# Patient Record
Sex: MALE | Race: WHITE | NOT HISPANIC OR LATINO | Employment: FULL TIME | URBAN - METROPOLITAN AREA
[De-identification: names, ages, dates, MRNs, and addresses within clinical notes are randomized per-mention and may not be internally consistent; named-entity substitution may affect disease eponyms.]

---

## 2017-02-12 ENCOUNTER — MYC REFILL (OUTPATIENT)
Dept: FAMILY MEDICINE | Facility: CLINIC | Age: 47
End: 2017-02-12

## 2017-02-12 DIAGNOSIS — I10 HYPERTENSION GOAL BP (BLOOD PRESSURE) < 140/90: ICD-10-CM

## 2017-02-13 RX ORDER — HYDROCHLOROTHIAZIDE 25 MG/1
25 TABLET ORAL DAILY
Qty: 90 TABLET | Refills: 0 | Status: SHIPPED | OUTPATIENT
Start: 2017-02-13 | End: 2017-03-02

## 2017-02-13 RX ORDER — LISINOPRIL 30 MG/1
30 TABLET ORAL DAILY
Qty: 90 TABLET | Refills: 0 | Status: SHIPPED | OUTPATIENT
Start: 2017-02-13 | End: 2017-03-02

## 2017-02-13 NOTE — TELEPHONE ENCOUNTER
Message from [employer]:  Original authorizing provider: Joaquina Goldman MD    Lev Braxtondemetricejarrett would like a refill of the following medications:  hydrochlorothiazide (HYDRODIURIL) 25 MG tablet [Joaquina Goldman MD]  lisinopril (PRINIVIL,ZESTRIL) 30 MG tablet [Joaquina Goldman MD]    Preferred pharmacy: [facility] [place], [address]    Comment:  Dr. Howe, Can you renew the prescriptions for my hypertension. I am down to a few pills and I did not realize that my current prescription has no more refills. While 90 days is preferred, even if you could give me a 30 day prescription, that would be very helpful. Please call it in to the [place] pharmacy on [facility]. I will make an appointment for my regular annual physical this week. Thanks, Lev

## 2017-02-13 NOTE — TELEPHONE ENCOUNTER
Prescription approved per [facility] Refill Protocol.  OPAL Werner, RN      hydrochlorothiazide (HYDRODIURIL) 25 MG tablet/     lisinopril (PRINIVIL,ZESTRIL) 30 MG tablet  Last Written Prescription Date: 11/14/16  Last Fill Quantity: 90, # refills: 0  Last Office Visit with [facility], [facility] or [facility] prescribing provider: 12/13/16 with HARSHA Askew  Next 5 appointments (look out 90 days)     Mar 02, 2017  8:40 AM CST   [facility] Physical Adult with Joaquina Goldman MD   [facility]    [address] [place] [id number] [phone]                   Potassium   Date Value Ref Range Status   02/14/2016 3.4 3.4 - 5.3 mmol/L Final     Creatinine   Date Value Ref Range Status   02/14/2016 0.88 0.66 - 1.25 mg/dL Final     BP Readings from Last 3 Encounters:   12/13/16 118/80   08/31/16 120/78   02/15/16 108/75

## 2017-02-14 ENCOUNTER — MYC REFILL (OUTPATIENT)
Dept: FAMILY MEDICINE | Facility: CLINIC | Age: 47
End: 2017-02-14

## 2017-02-14 DIAGNOSIS — I10 HYPERTENSION GOAL BP (BLOOD PRESSURE) < 140/90: ICD-10-CM

## 2017-02-14 RX ORDER — LISINOPRIL 30 MG/1
30 TABLET ORAL DAILY
Qty: 90 TABLET | Refills: 0 | Status: CANCELLED | OUTPATIENT
Start: 2017-02-14

## 2017-02-14 RX ORDER — HYDROCHLOROTHIAZIDE 25 MG/1
25 TABLET ORAL DAILY
Qty: 90 TABLET | Refills: 0 | Status: CANCELLED | OUTPATIENT
Start: 2017-02-14

## 2017-02-14 NOTE — TELEPHONE ENCOUNTER
Message from [employer]:  Original authorizing provider: Joaquina Goldman MD    Lev Braxtondemetricejarrett would like a refill of the following medications:  hydrochlorothiazide (HYDRODIURIL) 25 MG tablet [Joaquina Goldman MD]  lisinopril (PRINIVIL,ZESTRIL) 30 MG tablet [Joaquina Goldman MD]    Preferred pharmacy: [facility] [place], [address]    Comment:  Dr. Howe, Can you renew the prescriptions for my hypertension. I am down to a few pills and I did not realize that my current prescription has no more refills. While 90 days is preferred, even if you could give me a 30 day prescription, that would be very helpful. Please call it in to the [place] pharmacy on [facility]. I will make an appointment for my regular annual physical this week. Thanks, Lev

## 2017-02-15 NOTE — TELEPHONE ENCOUNTER
Both of these medications refilled on 2/13/17.    Writer responded as per below.    TORITO WernerN, RN

## 2017-03-02 ENCOUNTER — OFFICE VISIT (OUTPATIENT)
Dept: FAMILY MEDICINE | Facility: CLINIC | Age: 47
End: 2017-03-02
Payer: COMMERCIAL

## 2017-03-02 VITALS
WEIGHT: 189.25 LBS | DIASTOLIC BLOOD PRESSURE: 74 MMHG | HEIGHT: 70 IN | TEMPERATURE: 98.5 F | OXYGEN SATURATION: 98 % | SYSTOLIC BLOOD PRESSURE: 116 MMHG | BODY MASS INDEX: 27.09 KG/M2 | HEART RATE: 78 BPM

## 2017-03-02 DIAGNOSIS — I10 HYPERTENSION GOAL BP (BLOOD PRESSURE) < 140/90: ICD-10-CM

## 2017-03-02 DIAGNOSIS — Z00.00 ROUTINE GENERAL MEDICAL EXAMINATION AT A HEALTH CARE FACILITY: Primary | ICD-10-CM

## 2017-03-02 DIAGNOSIS — F40.298 ISOLATED OR SPECIFIC PHOBIA: ICD-10-CM

## 2017-03-02 DIAGNOSIS — Z13.220 LIPID SCREENING: ICD-10-CM

## 2017-03-02 LAB
ANION GAP SERPL CALCULATED.3IONS-SCNC: 7 MMOL/L (ref 3–14)
BUN SERPL-MCNC: 15 MG/DL (ref 7–30)
CALCIUM SERPL-MCNC: 9.3 MG/DL (ref 8.5–10.1)
CHLORIDE SERPL-SCNC: 104 MMOL/L (ref 94–109)
CHOLEST SERPL-MCNC: 198 MG/DL
CO2 SERPL-SCNC: 28 MMOL/L (ref 20–32)
CREAT SERPL-MCNC: 1 MG/DL (ref 0.66–1.25)
GFR SERPL CREATININE-BSD FRML MDRD: 80 ML/MIN/1.7M2
GLUCOSE SERPL-MCNC: 98 MG/DL (ref 70–99)
HDLC SERPL-MCNC: 52 MG/DL
LDLC SERPL CALC-MCNC: 116 MG/DL
NONHDLC SERPL-MCNC: 146 MG/DL
POTASSIUM SERPL-SCNC: 3.8 MMOL/L (ref 3.4–5.3)
SODIUM SERPL-SCNC: 139 MMOL/L (ref 133–144)
TRIGL SERPL-MCNC: 148 MG/DL

## 2017-03-02 PROCEDURE — 80048 BASIC METABOLIC PNL TOTAL CA: CPT | Performed by: FAMILY MEDICINE

## 2017-03-02 PROCEDURE — 99396 PREV VISIT EST AGE 40-64: CPT | Performed by: FAMILY MEDICINE

## 2017-03-02 PROCEDURE — 80061 LIPID PANEL: CPT | Performed by: FAMILY MEDICINE

## 2017-03-02 PROCEDURE — 36415 COLL VENOUS BLD VENIPUNCTURE: CPT | Performed by: FAMILY MEDICINE

## 2017-03-02 RX ORDER — LISINOPRIL 30 MG/1
30 TABLET ORAL DAILY
Qty: 90 TABLET | Refills: 3 | Status: SHIPPED | OUTPATIENT
Start: 2017-03-02 | End: 2018-02-14

## 2017-03-02 RX ORDER — LORAZEPAM 0.5 MG/1
0.5 TABLET ORAL 2 TIMES DAILY PRN
Qty: 60 TABLET | Refills: 0 | Status: SHIPPED | OUTPATIENT
Start: 2017-03-02 | End: 2017-11-06

## 2017-03-02 RX ORDER — HYDROCHLOROTHIAZIDE 25 MG/1
25 TABLET ORAL DAILY
Qty: 90 TABLET | Refills: 3 | Status: SHIPPED | OUTPATIENT
Start: 2017-03-02 | End: 2017-11-06

## 2017-03-02 NOTE — PROGRESS NOTES
SUBJECTIVE:     CC: Lev Copeland is an 46 year old male who presents for preventative health visit.     Physical   Annual:     Getting at least 3 servings of Calcium per day::  Yes    Bi-annual eye exam::  Yes    Dental care twice a year::  Yes    Sleep apnea or symptoms of sleep apnea::  None    Diet::  Regular (no restrictions)    Frequency of exercise::  4-5 days/week    Duration of exercise::  15-30 minutes    Taking medications regularly::  Yes    Medication side effects::  Not applicable    Additional concerns today::  No      PROBLEMS TO ADD ON...- no    Today's PHQ-2 Score:   PHQ-2 ( 1999 Pfizer) 2/27/2017   Little interest or pleasure in doing things Not at all   Feeling down, depressed or hopeless Not at all   PHQ-2 Score 0       Abuse: Current or Past(Physical, Sexual or Emotional)- No  Do you feel safe in your environment - Yes    Social History   Substance Use Topics     Smoking status: Never Smoker     Smokeless tobacco: Never Used     Alcohol use Yes      Comment: 5-6 Drinks per week     The patient does not drink >3 drinks per day nor >7 drinks per week.    Last PSA: No results found for: PSA    Recent Labs   Lab Test  02/04/16   0854  02/05/15   1009  03/27/13   0812   CHOL  209*  157  208*   HDL  57  58  39*   LDL  127*  73  122   TRIG  125  132  234*   CHOLHDLRATIO   --   2.7  5.3*   NHDL  152*   --    --        Reviewed orders with patient. Reviewed health maintenance and updated orders accordingly - Yes    Reviewed and updated as needed this visit by clinical staff  Tobacco  Allergies  Meds  Med Hx  Surg Hx  Fam Hx  Soc Hx        Reviewed and updated as needed this visit by Provider  Meds  Med Hx  Surg Hx  Fam Hx        Past Medical History   Diagnosis Date     Hypertension goal BP (blood pressure) < 140/90      Isolated or specific phobia      performance     Stuttering       Past Surgical History   Procedure Laterality Date     No history of surgery       ROS:  CONST: NEGATIVE  "for fevers/chills/sweats, unexplained weight loss/gain, and fatigue  EYES: NEGATIVE for change in vision  ENT: NEGATIVE for difficulty hearing/tinnitus, and problems with teeth/gums  BREAST: NEGATIVE for breast lump/discharge  CV: NEGATIVE for chest pain/discomfort, leg pain with exercise, and palpitations  RESP: NEGATIVE for cough/wheeze, and difficulty breathing  GI: NEGATIVE for abdominal pain, blood in bowel movement, and nausea/vomiting/diarrhea  : NEGATIVE for nighttime urination, leaking urine, unusual vaginal bleeding, penile/vaginal discharge, and concerns about sexual function  MS: NEGATIVE for muscle/joint pain  SKIN: NEGATIVE for rash or mole change  NEURO: NEGATIVE for headache, dizziness/lightheadedness, numbness, memory loss, and loss of coordination  PSYCH: NEGATIVE for anxiety/stress, problems with sleep, and depression  HEME: NEGATIVE for unexplained lumps, and easy bruising/bleeding  ENDO: NEGATIVE for excessive thirst or urination  ALL: NEGATIVE for hay fever/allergies       OBJECTIVE:     /74 (BP Location: Right arm, Patient Position: Chair, Cuff Size: Adult Large)  Pulse 78  Temp 98.5  F (36.9  C) (Oral)  Ht 5' 10.25\" (1.784 m)  Wt 189 lb 4 oz (85.8 kg)  SpO2 98%  BMI 26.96 kg/m2  EXAM:  GENERAL: healthy, alert and no distress  EYES: Eyes grossly normal to inspection, PERRL and conjunctivae and sclerae normal  HENT: ear canals and TM's normal, nose and mouth without ulcers or lesions  NECK: no adenopathy, no asymmetry, masses, or scars and thyroid normal to palpation  RESP: lungs clear to auscultation - no rales, rhonchi or wheezes  CV: regular rate and rhythm, normal S1 S2, no S3 or S4, no murmur, click or rub, no peripheral edema and peripheral pulses strong  ABDOMEN: soft, nontender, no hepatosplenomegaly, no masses and bowel sounds normal  MS: no gross musculoskeletal defects noted, no edema  SKIN: no suspicious lesions or rashes  NEURO: Normal strength and tone, mentation intact " and speech normal  PSYCH: mentation appears normal, affect normal/bright    ASSESSMENT/PLAN:         ICD-10-CM    1. Routine general medical examination at a health care facility Z00.00    2. Hypertension goal BP (blood pressure) < 140/90 I10 BASIC METABOLIC PANEL     lisinopril (PRINIVIL,ZESTRIL) 30 MG tablet     hydrochlorothiazide (HYDRODIURIL) 25 MG tablet   3. Lipid screening Z13.220 Lipid panel reflex to direct LDL   4. Isolated or specific phobia F40.298 LORazepam (ATIVAN) 0.5 MG tablet       COUNSELING:   Reviewed preventive health counseling, as reflected in patient instructions  Special attention given to:        Family planning - considering vasectomy       Weight management plan: Discussed healthy diet and exercise guidelines and patient will follow up in 12 months in clinic to re-evaluate.    Counseling Resources:  ATP IV Guidelines  Pooled Cohorts Equation Calculator  FRAX Risk Assessment  ICSI Preventive Guidelines  Dietary Guidelines for Americans, 2010  USDA's MyPlate  ASA Prophylaxis  Lung CA Screening    Joaquina Goldman MD  [facility]

## 2017-03-02 NOTE — NURSING NOTE
"Chief Complaint   Patient presents with     Physical     is fasting        Initial /74 (BP Location: Right arm, Patient Position: Chair, Cuff Size: Adult Large)  Pulse 78  Temp 98.5  F (36.9  C) (Oral)  Ht 5' 10.25\" (1.784 m)  Wt 189 lb 4 oz (85.8 kg)  SpO2 98%  BMI 26.96 kg/m2 Estimated body mass index is 26.96 kg/(m^2) as calculated from the following:    Height as of this encounter: 5' 10.25\" (1.784 m).    Weight as of this encounter: 189 lb 4 oz (85.8 kg).  Medication Reconciliation: complete     Trudy Sahu MA      "

## 2017-03-02 NOTE — MR AVS SNAPSHOT
After Visit Summary   3/2/2017    Lev Copeland    MRN: [id number]           Patient Information     Date Of Birth          1970        Visit Information        Provider Department      3/2/2017 8:40 AM Joaquina Goldman MD [facility]        Today's Diagnoses     Routine general medical examination at a health care facility    -  1    Hypertension goal BP (blood pressure) < 140/90        Lipid screening        Isolated or specific phobia          Care Instructions      Preventive Health Recommendations  Male Ages 40 to 49    Yearly exam:             See your health care provider every year in order to  o   Review health changes.   o   Discuss preventive care.    o   Review your medicines if your doctor has prescribed any.    You should be tested each year for STDs (sexually transmitted diseases) if you re at risk.     Have a cholesterol test every 5 years.     Have a colonoscopy (test for colon cancer) if someone in your family has had colon cancer or polyps before age 50.     After age 45, have a diabetes test (fasting glucose). If you are at risk for diabetes, you should have this test every 3 years.      Talk with your health care provider about whether or not a prostate cancer screening test (PSA) is right for you.    Shots: Get a flu shot each year. Get a tetanus shot every 10 years.     Nutrition:    Eat at least 5 servings of fruits and vegetables daily.     Eat whole-grain bread, whole-wheat pasta and brown rice instead of white grains and rice.     Talk to your provider about Calcium and Vitamin D.     Lifestyle    Exercise for at least 150 minutes a week (30 minutes a day, 5 days a week). This will help you control your weight and prevent disease.     Limit alcohol to one drink per day.     No smoking.     Wear sunscreen to prevent skin cancer.     See your dentist every six months for an exam and cleaning.            Follow-ups after your visit        Who to contact   "   If you have questions or need follow up information about today's clinic visit or your schedule please contact [facility] directly at [phone].  Normal or non-critical lab and imaging results will be communicated to you by HowGoodhart, letter or phone within 4 business days after the clinic has received the results. If you do not hear from us within 7 days, please contact the clinic through Sellobuyt or phone. If you have a critical or abnormal lab result, we will notify you by phone as soon as possible.  Submit refill requests through E[employer] Exchange or call your pharmacy and they will forward the refill request to us. Please allow 3 business days for your refill to be completed.          Additional Information About Your Visit        Efficiency Exchange Information     E[employer] Exchange gives you secure access to your electronic health record. If you see a primary care provider, you can also send messages to your care team and make appointments. If you have questions, please call your primary care clinic.  If you do not have a primary care provider, please call [phone] and they will assist you.        Care EveryWhere ID     This is your Care EveryWhere ID. This could be used by other organizations to access your [facility] medical records  [id number]        Your Vitals Were     Pulse Temperature Height Pulse Oximetry BMI (Body Mass Index)       78 98.5  F (36.9  C) (Oral) 5' 10.25\" (1.784 m) 98% 26.96 kg/m2        Blood Pressure from Last 3 Encounters:   03/02/17 116/74   12/13/16 118/80   08/31/16 120/78    Weight from Last 3 Encounters:   03/02/17 189 lb 4 oz (85.8 kg)   12/13/16 200 lb (90.7 kg)   08/31/16 202 lb (91.6 kg)              We Performed the Following     BASIC METABOLIC PANEL     Lipid panel reflex to direct LDL          Today's Medication Changes          These changes are accurate as of: 3/2/17  9:21 AM.  If you have any questions, ask your nurse or doctor.               Stop taking these medicines if you " haven't already. Please contact your care team if you have questions.     azithromycin 250 MG tablet   Commonly known as:  ZITHROMAX   Stopped by:  Joaquina Goldman MD           benzonatate 200 MG capsule   Commonly known as:  TESSALON   Stopped by:  Joaquina Goldman MD           fluticasone 50 MCG/ACT spray   Commonly known as:  FLONASE   Stopped by:  Joaquina Goldman MD                Where to get your medicines      These medications were sent to [facility] - [place], [place] - [address]  [address], [place] [address]     Phone:  [phone]     hydrochlorothiazide 25 MG tablet    lisinopril 30 MG tablet         Some of these will need a paper prescription and others can be bought over the counter.  Ask your nurse if you have questions.     Bring a paper prescription for each of these medications     LORazepam 0.5 MG tablet                Primary Care Provider Office Phone # Fax #    Joaquina Goldman [phone]       [address] [address]        Thank you!     Thank you for choosing [facility]  for your care. Our goal is always to provide you with excellent care. Hearing back from our patients is one way we can continue to improve our services. Please take a few minutes to complete the written survey that you may receive in the mail after your visit with us. Thank you!             Your Updated Medication List - Protect others around you: Learn how to safely use, store and throw away your medicines at www.disposemymeds.org.          This list is accurate as of: 3/2/17  9:21 AM.  Always use your most recent med list.                   Brand Name Dispense Instructions for use    hydrochlorothiazide 25 MG tablet    HYDRODIURIL    90 tablet    Take 1 tablet (25 mg) by mouth daily       lisinopril 30 MG tablet    PRINIVIL,ZESTRIL    90 tablet    Take 1 tablet (30 mg) by mouth daily       LORazepam 0.5 MG  tablet    ATIVAN    60 tablet    Take 1 tablet (0.5 mg) by mouth 2 times daily as needed

## 2017-03-02 NOTE — PROGRESS NOTES
"SUBJECTIVE:     CC: Lev Copeland is an 46 year old male who presents for preventative health visit.     Physical   Annual:     Getting at least 3 servings of Calcium per day::  Yes    Bi-annual eye exam::  Yes    Dental care twice a year::  Yes    Sleep apnea or symptoms of sleep apnea::  None    Diet::  Regular (no restrictions)    Frequency of exercise::  4-5 days/week    Duration of exercise::  15-30 minutes    Taking medications regularly::  Yes    Medication side effects::  Not applicable    Additional concerns today::  No      {Outside tests to abstract? :116354}    {additional problems to add:996423}    Today's PHQ-2 Score:   PHQ-2 ( 1999 Pfizer) 2/27/2017   Little interest or pleasure in doing things Not at all   Feeling down, depressed or hopeless Not at all   PHQ-2 Score 0       Abuse: Current or Past(Physical, Sexual or Emotional)- {YES/NO/NA:004880}  Do you feel safe in your environment - {YES/NO/NA:015786}    Social History   Substance Use Topics     Smoking status: Never Smoker     Smokeless tobacco: Never Used     Alcohol use Yes      Comment: 5-6 Drinks per week     {ETOH AUDIT:823780}    Last PSA: No results found for: PSA    Recent Labs   Lab Test  02/04/16   0854  02/05/15   1009  03/27/13   0812   CHOL  209*  157  208*   HDL  57  58  39*   LDL  127*  73  122   TRIG  125  132  234*   CHOLHDLRATIO   --   2.7  5.3*   NHDL  152*   --    --        Reviewed orders with patient. Reviewed health maintenance and updated orders accordingly - {Yes/No:337031::\"Yes\"}    Reviewed and updated as needed this visit by clinical staff         Reviewed and updated as needed this visit by Provider        {HISTORY OPTIONS:459281}    ROS:  {MALE ROS-adult preventive care package:810541::\"C: NEGATIVE for fever, chills, change in weight\",\"I: NEGATIVE for worrisome rashes, moles or lesions\",\"E: NEGATIVE for vision changes or irritation\",\"ENT: NEGATIVE for ear, mouth and throat problems\",\"R: NEGATIVE for significant " "cough or SOB\",\"CV: NEGATIVE for chest pain, palpitations or peripheral edema\",\"GI: NEGATIVE for nausea, abdominal pain, heartburn, or change in bowel habits\",\" male: negative for dysuria, hematuria, decreased urinary stream, erectile dysfunction, urethral discharge\",\"M: NEGATIVE for significant arthralgias or myalgia\",\"N: NEGATIVE for weakness, dizziness or paresthesias\",\"P: NEGATIVE for changes in mood or affect\"}    {CHRONICPROBDATA:268536}  OBJECTIVE:     There were no vitals taken for this visit.  EXAM:  {Exam Choices:426453}    ASSESSMENT/PLAN:     {Diag Picklist:522649}    COUNSELING:   {MALE COUNSELING MESSAGES:727933::\"Reviewed preventive health counseling, as reflected in patient instructions\"}    {Blood Pressure - Adult Preventive:796748}     reports that he has never smoked. He has never used smokeless tobacco.  {Tobacco Cessation needed for ACO -- Delete if patient is a non-smoker:382209}  Estimated body mass index is 28.7 kg/(m^2) as calculated from the following:    Height as of 8/31/16: 5' 10\" (1.778 m).    Weight as of 12/13/16: 200 lb (90.7 kg).   {Weight Management Plan needed for ACO:578022}    Counseling Resources:  ATP IV Guidelines  Pooled Cohorts Equation Calculator  FRAX Risk Assessment  ICSI Preventive Guidelines  Dietary Guidelines for Americans, 2010  USDA's MyPlate  ASA Prophylaxis  Lung CA Screening    Joaquina Goldman MD  [facility]  Answers for HPI/ROS submitted by the patient on 2/27/2017   PHQ-2 Depressed: Not at all, Not at all  PHQ-2 Score: 0    "

## 2017-03-03 NOTE — PROGRESS NOTES
Abdi Ohara,  Thanks for coming to clinic.  Your test results are below.  Everything looks fine.  In fact, your lipid panel (cholesterol) results are improved overall from last year.  Good job!  Joaquina Goldman MD

## 2017-10-26 ENCOUNTER — OFFICE VISIT (OUTPATIENT)
Dept: FAMILY MEDICINE | Facility: CLINIC | Age: 47
End: 2017-10-26
Payer: COMMERCIAL

## 2017-10-26 VITALS
OXYGEN SATURATION: 97 % | HEIGHT: 70 IN | BODY MASS INDEX: 28.77 KG/M2 | SYSTOLIC BLOOD PRESSURE: 136 MMHG | TEMPERATURE: 97.8 F | DIASTOLIC BLOOD PRESSURE: 89 MMHG | HEART RATE: 100 BPM | WEIGHT: 201 LBS

## 2017-10-26 DIAGNOSIS — R42 DIZZINESS: Primary | ICD-10-CM

## 2017-10-26 LAB
ERYTHROCYTE [DISTWIDTH] IN BLOOD BY AUTOMATED COUNT: 11.8 % (ref 10–15)
GLUCOSE BLD-MCNC: 121 MG/DL (ref 70–99)
HCT VFR BLD AUTO: 40 % (ref 40–53)
HGB BLD-MCNC: 13.7 G/DL (ref 13.3–17.7)
MCH RBC QN AUTO: 31.7 PG (ref 26.5–33)
MCHC RBC AUTO-ENTMCNC: 34.3 G/DL (ref 31.5–36.5)
MCV RBC AUTO: 93 FL (ref 78–100)
PLATELET # BLD AUTO: 223 10E9/L (ref 150–450)
RBC # BLD AUTO: 4.32 10E12/L (ref 4.4–5.9)
WBC # BLD AUTO: 6 10E9/L (ref 4–11)

## 2017-10-26 PROCEDURE — 93000 ELECTROCARDIOGRAM COMPLETE: CPT | Performed by: INTERNAL MEDICINE

## 2017-10-26 PROCEDURE — 82947 ASSAY GLUCOSE BLOOD QUANT: CPT | Performed by: INTERNAL MEDICINE

## 2017-10-26 PROCEDURE — 99213 OFFICE O/P EST LOW 20 MIN: CPT | Performed by: INTERNAL MEDICINE

## 2017-10-26 PROCEDURE — 36415 COLL VENOUS BLD VENIPUNCTURE: CPT | Performed by: INTERNAL MEDICINE

## 2017-10-26 PROCEDURE — 84439 ASSAY OF FREE THYROXINE: CPT | Performed by: INTERNAL MEDICINE

## 2017-10-26 PROCEDURE — 85027 COMPLETE CBC AUTOMATED: CPT | Performed by: INTERNAL MEDICINE

## 2017-10-26 PROCEDURE — 84443 ASSAY THYROID STIM HORMONE: CPT | Performed by: INTERNAL MEDICINE

## 2017-10-26 NOTE — MR AVS SNAPSHOT
After Visit Summary   10/26/2017    Lev Copeland    MRN: [id number]           Patient Information     Date Of Birth          1970        Visit Information        Provider Department      10/26/2017 3:30 PM Unique Alvarenga MD [facility]        Today's Diagnoses     Dizziness    -  1       Follow-ups after your visit        Future tests that were ordered for you today     Open Future Orders        Priority Expected Expires Ordered    **Basic metabolic panel FUTURE 14d STAT 11/2/2017 11/9/2017 10/26/2017            Who to contact     If you have questions or need follow up information about today's clinic visit or your schedule please contact [facility] directly at [phone].  Normal or non-critical lab and imaging results will be communicated to you by MyChart, letter or phone within 4 business days after the clinic has received the results. If you do not hear from us within 7 days, please contact the clinic through youcalchart or phone. If you have a critical or abnormal lab result, we will notify you by phone as soon as possible.  Submit refill requests through nexTune or call your pharmacy and they will forward the refill request to us. Please allow 3 business days for your refill to be completed.          Additional Information About Your Visit        MyChart Information     nexTune gives you secure access to your electronic health record. If you see a primary care provider, you can also send messages to your care team and make appointments. If you have questions, please call your primary care clinic.  If you do not have a primary care provider, please call [phone] and they will assist you.        Care EveryWhere ID     This is your Care EveryWhere ID. This could be used by other organizations to access your [facility] medical records  [id number]        Your Vitals Were     Pulse Temperature Height Pulse Oximetry BMI (Body Mass Index)       100  "97.8  F (36.6  C) (Oral) 5' 10\" (1.778 m) 97% 28.84 kg/m2        Blood Pressure from Last 3 Encounters:   10/26/17 136/89   03/02/17 116/74   12/13/16 118/80    Weight from Last 3 Encounters:   10/26/17 201 lb (91.2 kg)   03/02/17 189 lb 4 oz (85.8 kg)   12/13/16 200 lb (90.7 kg)              We Performed the Following     CBC with platelets     EKG 12-lead complete w/read - Clinics     Glucose, whole blood     T4, free     TSH        Primary Care Provider Office Phone # Fax #    Joaquina Goldman [phone]       [address] [facility] [address]        Equal Access to Services     ISAIAS SEXTON : Hadii sharon nicoleo Soneil, waaxda luqadaha, qaybta kaalmada adeegyada, leonard hsu . So [facility] [phone].    ATENCIÓN: Si habla español, tiene a lin disposición servicios gratuitos de asistencia lingüística. Sharad al [phone].    We comply with applicable federal civil rights laws and [place] laws. We do not discriminate on the basis of race, color, national origin, age, disability, sex, sexual orientation, or gender identity.            Thank you!     Thank you for choosing [facility]  for your care. Our goal is always to provide you with excellent care. Hearing back from our patients is one way we can continue to improve our services. Please take a few minutes to complete the written survey that you may receive in the mail after your visit with us. Thank you!             Your Updated Medication List - Protect others around you: Learn how to safely use, store and throw away your medicines at www.disposemymeds.org.          This list is accurate as of: 10/26/17  5:36 PM.  Always use your most recent med list.                   Brand Name Dispense Instructions for use Diagnosis    hydrochlorothiazide 25 MG tablet    HYDRODIURIL    90 tablet    Take 1 tablet (25 mg) by mouth daily    Hypertension goal BP (blood pressure) < 140/90       " lisinopril 30 MG tablet    PRINIVIL,ZESTRIL    90 tablet    Take 1 tablet (30 mg) by mouth daily    Hypertension goal BP (blood pressure) < 140/90       LORazepam 0.5 MG tablet    ATIVAN    60 tablet    Take 1 tablet (0.5 mg) by mouth 2 times daily as needed    Isolated or specific phobia

## 2017-10-26 NOTE — PROGRESS NOTES
[facility] Progress Note        Unique Alvarenga MD, MPH  10/26/2017        History:      A pleasant 46 year old year old male is seen for feeling anxious an d occasionally dizzy for one month. No nausea,vomiting or diarrhea is referred. No headache,or neck stiffness or visual symptoms. No melena or hematuria or hematochezia is referred. No cough or dyspnea or chest pain is referred. No fever or chills. No insect bite or rash. He has underlying HX of hypertension for which he takes HCTZ and lisinopril. He also takes lorazepam for underlying anxiety. However he states that he has not been taking it consistently,although he states that he took his morning dose of 0.5 today. He denies palpitation and denies night sweats or chills. No weight loss or weight gain is referred. No cold or heat intolerance. No loose stools. No tremor or seizure activities. No excessive thirst, polyphagia or polyuria is referred. No MSK symptoms. No calf pain or swelling. No recent travel outside of the [place] is referred.         Assessment and Plan:        1. Dizziness: Neurologically intact and stable.  2. Anxiety disorder: Advised to resume Lorazepam and take it consistently to avoid breakthrough anxiety episodes.  - EKG 12-lead complete : Sinus rhythm; No st segment or T-wave abnormalities. HR=87 BPM.  - CBC with platelets:   -Basic metabolic panel : result is pending  - TSH, T4, free: pending result.  - Glucose, whole blood: non-fasting= 121   Discussed with the patient that a beta blocker may be a good adjuvant for management of his symptoms given his Hx of HPTN and coexisting anxiety disorder. I offered to prescribe it for him. He stated that he would want to take up the discussion with Dr Goldman whom he plans to see within this coming week.  I advised the patient that he should seek medical attention if there is any chest pain ,sudden dyspnea or any visual symptoms or any neurological dysfunction by going to ER or  "calling 911. He expressed understanding.                       Physical Exam:      /89  Pulse 100  Temp 97.8  F (36.6  C) (Oral)  Ht 5' 10\" (1.778 m)  Wt 201 lb (91.2 kg)  SpO2 97%  BMI 28.84 kg/m2     Constitutional: Patient is in no distress The patient is pleasant and cooperative.   HEENT: Head:  Head is atraumatic, normocephalic.    Eyes: Pupils are equal, round and reactive to light and accomodation.  Sclera is non-icteric. No conjunctival injection, or exudate noted. Extraocular motion is intact. Visual acuity is intact bilaterally.  Ears:  External acoustic canals are patent and clear.  There is no erythema and bulging( exudate)  of the ( R/L ) tympanic membrane(s ).   Nose:  No Nasal congestion w/o drainage or mucosal ulceration is noted.  Throat:  Oral mucosa is moist.  No oral lesions are noted.  No posterior pharyngeal hyperemia or exudate noted.     Neck Supple.  There is no cervical lymphadenopathy.  No nuchal rigidity noted.  There is no meningismus.     Cardiovascular: Heart is regular to rate and rhythm.  No murmur is noted.     Chest. Chest Symmetrical, no soft tissues, swelling, or tenderness upon palpation   Lungs: Clear in the anterior and posterior pulmonary fields.   Abdomen: Soft and non-tender.    Back No flank tenderness is noted.   Extremeties No edema, no calf tenderness.   Neuro: No focal deficit. DTR's and cranial nerves are intact. Babinski is absent and romberg is negative. Patient does have some speech impediment.   Skin No petechiae or purpura is noted.  There is no rash.   Mood Normal              Medications:        PRN Meds:          Data:      All new lab and imaging data was reviewed.   Results for orders placed or performed in visit on 10/26/17   CBC with platelets   Result Value Ref Range    WBC 6.0 4.0 - 11.0 10e9/L    RBC Count 4.32 (L) 4.4 - 5.9 10e12/L    Hemoglobin 13.7 13.3 - 17.7 g/dL    Hematocrit 40.0 40.0 - 53.0 %    MCV 93 78 - 100 fl    MCH 31.7 26.5 - " 33.0 pg    MCHC 34.3 31.5 - 36.5 g/dL    RDW 11.8 10.0 - 15.0 %    Platelet Count 223 150 - 450 10e9/L   Glucose, whole blood   Result Value Ref Range    Glucose Whole Blood 121 (H) 70 - 99 mg/dL

## 2017-10-26 NOTE — NURSING NOTE
"Chief Complaint   Patient presents with     Dizziness     Pt in clinic c/o 2 months of intermittent dizziness and some chest tightness.     Respiratory Problems       Initial /89  Pulse 100  Temp 97.8  F (36.6  C) (Oral)  Ht 5' 10\" (1.778 m)  Wt 201 lb (91.2 kg)  SpO2 97%  BMI 28.84 kg/m2 Estimated body mass index is 28.84 kg/(m^2) as calculated from the following:    Height as of this encounter: 5' 10\" (1.778 m).    Weight as of this encounter: 201 lb (91.2 kg).  Medication Reconciliation: complete   Marie Berry/ MA    "

## 2017-10-27 LAB
T4 FREE SERPL-MCNC: 1 NG/DL (ref 0.76–1.46)
TSH SERPL DL<=0.005 MIU/L-ACNC: 1.82 MU/L (ref 0.4–4)

## 2017-11-05 NOTE — PROGRESS NOTES
"     SUBJECTIVE:   Lev Copeland is a 46 year old male who presents to clinic today for the following health issues:      ED/UC Followup:    Facility:  [facility]   Date of visit: 10/26/2017  Reason for visit: dizziness  Current Status: improved     Lev presented to urgent care with complaints of anxiety and intermittent dizziness x 1 month.   Then he \"was just not feeling right\" so went to urgent care.    He had evaluation there including EKG and labs (CBC, glucose, TSH) - all unremarkable.  He was advised to use his lorazepam more liberally until he could see me in follow-up.  He has a prescription for lorazepam for performance anxiety situations (public speaking).  Since the UC visit he has been taking lorazepam twice daily which has been helpful.  He usually takes first dose in AM and then if needed another dose later in the morning.  Does not typically need further dosing during the day.  Has had some episodes of anxiety where he has wanted to cancel class.  (He is a professor.)  No specific triggers or life stressors identified.  Mood is ok    He describes the dizziness as a \"lightheaded feeling.\"  Feels like he stood up too fast (but occurs at other times besides standing up).  He states it does not feel like movement or instability or vertigo  It is intermittent/espisodic.  No associated chest pain or palpitations.       PHQ-9 SCORE 8/31/2016 11/6/2017   Total Score 2 1      CHANDNI-7 SCORE 8/31/2016 11/6/2017   Total Score 2 14          Problem list and histories reviewed & adjusted, as indicated.  Additional history: as documented    BP Readings from Last 3 Encounters:   11/06/17 126/88   10/26/17 136/89   03/02/17 116/74    Wt Readings from Last 3 Encounters:   11/06/17 201 lb (91.2 kg)   10/26/17 201 lb (91.2 kg)   03/02/17 189 lb 4 oz (85.8 kg)        Reviewed and updated as needed this visit by clinical staff  Tobacco  Allergies  Meds  Med Hx  Surg Hx  Fam Hx  Soc Hx    "     ROS:  ENT: NEGATIVE for ear pain, pressure, tinnitus, or change in hearing  CV: NEGATIVE for loss of consciousness     OBJECTIVE:     /88 (BP Location: Right arm, Patient Position: Chair, Cuff Size: Adult Regular)  Pulse 93  Temp 97.7  F (36.5  C) (Tympanic)  Resp 18  Wt 201 lb (91.2 kg)  SpO2 95%  BMI 28.84 kg/m2  Body mass index is 28.84 kg/(m^2).  GEN:  no apparent distress  ENT: external ears and nose without lesions or scars, TM's and canals clear bilaterally and oropharynx clear with moist mucus membranes and normal landmarks  NECK:  Supple without adenopathy, mass, or thyromegaly   LUNGS:  normal respiratory effort, and lungs clear to auscultation bilaterally - no rales, rhonchi or wheezes  CV: regular rate and rhythm, normal S1 S2, no S3 or S4 and no murmur, click or rub      ASSESSMENT/PLAN:       1. Anxiety  2. Isolated or specific phobia  Unclear etiology for recent increase in anxiety.  Discussed treatment options.  Discussed that prn benzo is ok on an occasional basis but if needed daily (as he has needed) we should consider other options including SSRI - or BB as recommended by NIKA MD.  Discussed pros/cons of each and he'd like to try switching his anti-hypertensive regimen to include BB.  For now we'll have him continue to use ativan prn.    - LORazepam (ATIVAN) 0.5 MG tablet; Take 1 tablet (0.5 mg) by mouth 2 times daily as needed  Dispense: 60 tablet; Refill: 0    3. Hypertension goal BP (blood pressure) < 140/90  He'll discontinue hydrochlorothiazide, continue lisinopril, and start toprol.  Discussed potential side effects including bradycardia and hypotension as well as fatigue and possible sexual dysfunction.  I'd like to see him in 2 weeks to monitor HR and BP and assess effects on anxiety.   - metoprolol (TOPROL-XL) 50 MG 24 hr tablet; Take 1 tablet (50 mg) by mouth daily  Dispense: 30 tablet; Refill: 0    4. Dizziness  Unclear etiology.  Initial workup is unremarkable and  symptoms are improved.  Likely related to anxiety and we will monitor.     FUTURE APPOINTMENTS:       - Follow-up visit in 3 weeks.    Patient Instructions   Stop the hydrochlorothiazide.  Start the metoprolol succinate once daily.  Continue the lisinopril once daily.        Joaquina Goldman MD  [facility]

## 2017-11-06 ENCOUNTER — OFFICE VISIT (OUTPATIENT)
Dept: FAMILY MEDICINE | Facility: CLINIC | Age: 47
End: 2017-11-06
Payer: COMMERCIAL

## 2017-11-06 VITALS
SYSTOLIC BLOOD PRESSURE: 126 MMHG | DIASTOLIC BLOOD PRESSURE: 88 MMHG | RESPIRATION RATE: 18 BRPM | OXYGEN SATURATION: 95 % | HEART RATE: 93 BPM | BODY MASS INDEX: 28.84 KG/M2 | WEIGHT: 201 LBS | TEMPERATURE: 97.7 F

## 2017-11-06 DIAGNOSIS — F40.298 ISOLATED OR SPECIFIC PHOBIA: ICD-10-CM

## 2017-11-06 DIAGNOSIS — I10 HYPERTENSION GOAL BP (BLOOD PRESSURE) < 140/90: ICD-10-CM

## 2017-11-06 DIAGNOSIS — R42 DIZZINESS: ICD-10-CM

## 2017-11-06 DIAGNOSIS — F41.9 ANXIETY: Primary | ICD-10-CM

## 2017-11-06 PROCEDURE — 99214 OFFICE O/P EST MOD 30 MIN: CPT | Performed by: FAMILY MEDICINE

## 2017-11-06 RX ORDER — METOPROLOL SUCCINATE 50 MG/1
50 TABLET, EXTENDED RELEASE ORAL DAILY
Qty: 30 TABLET | Refills: 0 | Status: SHIPPED | OUTPATIENT
Start: 2017-11-06 | End: 2017-11-27

## 2017-11-06 RX ORDER — LORAZEPAM 0.5 MG/1
0.5 TABLET ORAL 2 TIMES DAILY PRN
Qty: 60 TABLET | Refills: 0 | Status: SHIPPED | OUTPATIENT
Start: 2017-11-06 | End: 2017-12-27

## 2017-11-06 ASSESSMENT — ANXIETY QUESTIONNAIRES
5. BEING SO RESTLESS THAT IT IS HARD TO SIT STILL: SEVERAL DAYS
2. NOT BEING ABLE TO STOP OR CONTROL WORRYING: MORE THAN HALF THE DAYS
1. FEELING NERVOUS, ANXIOUS, OR ON EDGE: NEARLY EVERY DAY
7. FEELING AFRAID AS IF SOMETHING AWFUL MIGHT HAPPEN: NEARLY EVERY DAY
3. WORRYING TOO MUCH ABOUT DIFFERENT THINGS: MORE THAN HALF THE DAYS
GAD7 TOTAL SCORE: 14
IF YOU CHECKED OFF ANY PROBLEMS ON THIS QUESTIONNAIRE, HOW DIFFICULT HAVE THESE PROBLEMS MADE IT FOR YOU TO DO YOUR WORK, TAKE CARE OF THINGS AT HOME, OR GET ALONG WITH OTHER PEOPLE: SOMEWHAT DIFFICULT
6. BECOMING EASILY ANNOYED OR IRRITABLE: SEVERAL DAYS

## 2017-11-06 ASSESSMENT — PATIENT HEALTH QUESTIONNAIRE - PHQ9
SUM OF ALL RESPONSES TO PHQ QUESTIONS 1-9: 1
5. POOR APPETITE OR OVEREATING: MORE THAN HALF THE DAYS

## 2017-11-06 NOTE — MR AVS SNAPSHOT
After Visit Summary   11/6/2017    Lev Copeland    MRN: [id number]           Patient Information     Date Of Birth          1970        Visit Information        Provider Department      11/6/2017 5:00 PM Joaquina Goldman MD [facility]        Today's Diagnoses     Hypertension goal BP (blood pressure) < 140/90    -  1    Anxiety          Care Instructions    Stop the hydrochlorothiazide.  Start the metoprolol succinate once daily.  Continue the lisinopril once daily.            Follow-ups after your visit        Follow-up notes from your care team     Return in about 3 weeks (around 11/27/2017).      Who to contact     If you have questions or need follow up information about today's clinic visit or your schedule please contact [facility] directly at [phone].  Normal or non-critical lab and imaging results will be communicated to you by MyChart, letter or phone within 4 business days after the clinic has received the results. If you do not hear from us within 7 days, please contact the clinic through [employer] or phone. If you have a critical or abnormal lab result, we will notify you by phone as soon as possible.  Submit refill requests through [employer] or call your pharmacy and they will forward the refill request to us. Please allow 3 business days for your refill to be completed.          Additional Information About Your Visit        MyChart Information     [employer] gives you secure access to your electronic health record. If you see a primary care provider, you can also send messages to your care team and make appointments. If you have questions, please call your primary care clinic.  If you do not have a primary care provider, please call [phone] and they will assist you.        Care EveryWhere ID     This is your Care EveryWhere ID. This could be used by other organizations to access your [facility] medical records  [id number]        Your Vitals  Were     Pulse Temperature Respirations Pulse Oximetry BMI (Body Mass Index)       93 97.7  F (36.5  C) (Tympanic) 18 95% 28.84 kg/m2        Blood Pressure from Last 3 Encounters:   11/06/17 126/88   10/26/17 136/89   03/02/17 116/74    Weight from Last 3 Encounters:   11/06/17 201 lb (91.2 kg)   10/26/17 201 lb (91.2 kg)   03/02/17 189 lb 4 oz (85.8 kg)              Today, you had the following     No orders found for display         Today's Medication Changes          These changes are accurate as of: 11/6/17  6:11 PM.  If you have any questions, ask your nurse or doctor.               Start taking these medicines.        Dose/Directions    metoprolol 50 MG 24 hr tablet   Commonly known as:  TOPROL-XL   Used for:  Hypertension goal BP (blood pressure) < 140/90   Started by:  Joaquina Goldman MD        Dose:  50 mg   Take 1 tablet (50 mg) by mouth daily   Quantity:  30 tablet   Refills:  0         Stop taking these medicines if you haven't already. Please contact your care team if you have questions.     hydrochlorothiazide 25 MG tablet   Commonly known as:  HYDRODIURIL   Stopped by:  Joaquina Goldman MD                Where to get your medicines      These medications were sent to [facility] [address]  [address] [facility] [address]     Phone:  [phone]     metoprolol 50 MG 24 hr tablet                Primary Care Provider Office Phone # Fax #    Joaquina Goldman [phone]       [address]  [facility] [address]        Equal Access to Services     [facility] AH: Hadenoch Neff, dewayne asher, qaleonard barnes. So [facility] [phone].    ATENCIÓN: Si habla español, tiene a lin disposición servicios gratuitos de asistencia lingüística. Sharad sheffield [phone].    We comply with applicable federal civil rights laws and [place] laws. We do not discriminate on the basis of  race, color, national origin, age, disability, sex, sexual orientation, or gender identity.            Thank you!     Thank you for choosing [facility]  for your care. Our goal is always to provide you with excellent care. Hearing back from our patients is one way we can continue to improve our services. Please take a few minutes to complete the written survey that you may receive in the mail after your visit with us. Thank you!             Your Updated Medication List - Protect others around you: Learn how to safely use, store and throw away your medicines at www.disposemymeds.org.          This list is accurate as of: 11/6/17  6:11 PM.  Always use your most recent med list.                   Brand Name Dispense Instructions for use Diagnosis    lisinopril 30 MG tablet    PRINIVIL,ZESTRIL    90 tablet    Take 1 tablet (30 mg) by mouth daily    Hypertension goal BP (blood pressure) < 140/90       LORazepam 0.5 MG tablet    ATIVAN    60 tablet    Take 1 tablet (0.5 mg) by mouth 2 times daily as needed    Isolated or specific phobia       metoprolol 50 MG 24 hr tablet    TOPROL-XL    30 tablet    Take 1 tablet (50 mg) by mouth daily    Hypertension goal BP (blood pressure) < 140/90

## 2017-11-06 NOTE — NURSING NOTE
"Chief Complaint   Patient presents with     Dizziness     Anxiety       Initial /88 (BP Location: Right arm, Patient Position: Chair, Cuff Size: Adult Regular)  Pulse 93  Temp 97.7  F (36.5  C) (Tympanic)  Resp 18  Wt 201 lb (91.2 kg)  SpO2 95%  BMI 28.84 kg/m2 Estimated body mass index is 28.84 kg/(m^2) as calculated from the following:    Height as of 10/26/17: 5' 10\" (1.778 m).    Weight as of this encounter: 201 lb (91.2 kg).  Medication Reconciliation: complete Bonnie Harding MA        "

## 2017-11-07 ASSESSMENT — ANXIETY QUESTIONNAIRES: GAD7 TOTAL SCORE: 14

## 2017-11-07 NOTE — PATIENT INSTRUCTIONS
Stop the hydrochlorothiazide.  Start the metoprolol succinate once daily.  Continue the lisinopril once daily.

## 2017-11-26 NOTE — PROGRESS NOTES
SUBJECTIVE:  Lev Copeland, a 46 year old male, is here to discuss the following issues:     ANXIETY AND HYPERTENSION FOLLOW-UP   We had him switch his hydrochlorothiazide to toprol to see if that would control his BP and improve his anxiety.  He also takes lisinopril 30 mg daily for Htn and has ativan 0.5 mg prn for anxiety.  He notes that after the first few days of metoprolol his anxiety did start to improve.  He has not needed to use any ativan since he started metoprolol.  He has been checking his blood pressure at home and it has been running 130's/80's - a bit higher than when he was on lisinopril and hydrochlorothiazide.    PHQ-9 SCORE 8/31/2016 11/6/2017   Total Score 2 1     No flowsheet data found.  CHANDNI-7 SCORE 8/31/2016 11/6/2017 11/27/2017   Total Score 2 14 0           Problem list and histories reviewed & updated, as indicated.  Patient Active Problem List   Diagnosis     Isolated or specific phobia     Hypertension goal BP (blood pressure) < 140/90       BP Readings from Last 3 Encounters:   11/27/17 136/86   11/06/17 126/88   10/26/17 136/89    Wt Readings from Last 3 Encounters:   11/27/17 205 lb 8 oz (93.2 kg)   11/06/17 201 lb (91.2 kg)   10/26/17 201 lb (91.2 kg)         ROS:  CONST: NEGATIVE for fatigue        OBJECTIVE:  /86  Pulse 63  Temp 97.9  F (36.6  C) (Oral)  Wt 205 lb 8 oz (93.2 kg)  SpO2 95%  BMI 29.49 kg/m2  GEN:  no apparent distress   PSYCH:  Appearance/Behavior: patient is appropriately and casually dressed.  Mood/Affect: Bright/congruent.  Insight: good     ASSESSMENT/PLAN:    ICD-10-CM    1. Hypertension goal BP (blood pressure) < 140/90 I10 metoprolol (TOPROL-XL) 50 MG 24 hr tablet   2. Anxiety F41.9 metoprolol (TOPROL-XL) 50 MG 24 hr tablet     Adding the BB has worked well for his anxiety.  BP is controlled but not as well as it had been.  We discussed options including increasing the lisinopril dose to 40 mg or switching lisinopril to lisinopril-HCTZ.  For  now he'd like to continue on current regimen and monitor blood pressure.          Joaquina Goldman MD   [facility]

## 2017-11-27 ENCOUNTER — OFFICE VISIT (OUTPATIENT)
Dept: FAMILY MEDICINE | Facility: CLINIC | Age: 47
End: 2017-11-27
Payer: COMMERCIAL

## 2017-11-27 VITALS
HEART RATE: 63 BPM | DIASTOLIC BLOOD PRESSURE: 86 MMHG | SYSTOLIC BLOOD PRESSURE: 136 MMHG | WEIGHT: 205.5 LBS | TEMPERATURE: 97.9 F | BODY MASS INDEX: 29.49 KG/M2 | OXYGEN SATURATION: 95 %

## 2017-11-27 DIAGNOSIS — I10 HYPERTENSION GOAL BP (BLOOD PRESSURE) < 140/90: ICD-10-CM

## 2017-11-27 DIAGNOSIS — F41.9 ANXIETY: ICD-10-CM

## 2017-11-27 PROCEDURE — 99213 OFFICE O/P EST LOW 20 MIN: CPT | Performed by: FAMILY MEDICINE

## 2017-11-27 RX ORDER — METOPROLOL SUCCINATE 50 MG/1
50 TABLET, EXTENDED RELEASE ORAL DAILY
Qty: 90 TABLET | Refills: 3 | Status: SHIPPED | OUTPATIENT
Start: 2017-11-27 | End: 2018-08-02

## 2017-11-27 ASSESSMENT — ANXIETY QUESTIONNAIRES
7. FEELING AFRAID AS IF SOMETHING AWFUL MIGHT HAPPEN: NOT AT ALL
1. FEELING NERVOUS, ANXIOUS, OR ON EDGE: NOT AT ALL
GAD7 TOTAL SCORE: 0
IF YOU CHECKED OFF ANY PROBLEMS ON THIS QUESTIONNAIRE, HOW DIFFICULT HAVE THESE PROBLEMS MADE IT FOR YOU TO DO YOUR WORK, TAKE CARE OF THINGS AT HOME, OR GET ALONG WITH OTHER PEOPLE: NOT DIFFICULT AT ALL
3. WORRYING TOO MUCH ABOUT DIFFERENT THINGS: NOT AT ALL
5. BEING SO RESTLESS THAT IT IS HARD TO SIT STILL: NOT AT ALL
2. NOT BEING ABLE TO STOP OR CONTROL WORRYING: NOT AT ALL
6. BECOMING EASILY ANNOYED OR IRRITABLE: NOT AT ALL

## 2017-11-27 ASSESSMENT — PATIENT HEALTH QUESTIONNAIRE - PHQ9: 5. POOR APPETITE OR OVEREATING: NOT AT ALL

## 2017-11-27 NOTE — MR AVS SNAPSHOT
After Visit Summary   11/27/2017    Lev Copeland    MRN: [id number]           Patient Information     Date Of Birth          1970        Visit Information        Provider Department      11/27/2017 5:20 PM Joaquina Goldman MD [facility]        Today's Diagnoses     Hypertension goal BP (blood pressure) < 140/90           Follow-ups after your visit        Who to contact     If you have questions or need follow up information about today's clinic visit or your schedule please contact [facility] directly at [phone].  Normal or non-critical lab and imaging results will be communicated to you by [employer], letter or phone within 4 business days after the clinic has received the results. If you do not hear from us within 7 days, please contact the clinic through [employer] or phone. If you have a critical or abnormal lab result, we will notify you by phone as soon as possible.  Submit refill requests through [employer] or call your pharmacy and they will forward the refill request to us. Please allow 3 business days for your refill to be completed.          Additional Information About Your Visit        MyChart Information     [employer] gives you secure access to your electronic health record. If you see a primary care provider, you can also send messages to your care team and make appointments. If you have questions, please call your primary care clinic.  If you do not have a primary care provider, please call [phone] and they will assist you.        Care EveryWhere ID     This is your Care EveryWhere ID. This could be used by other organizations to access your [facility] medical records  [id number]        Your Vitals Were     Pulse Temperature Pulse Oximetry BMI (Body Mass Index)          63 97.9  F (36.6  C) (Oral) 95% 29.49 kg/m2         Blood Pressure from Last 3 Encounters:   11/27/17 136/86   11/06/17 126/88   10/26/17 136/89    Weight from Last 3 Encounters:    11/27/17 205 lb 8 oz (93.2 kg)   11/06/17 201 lb (91.2 kg)   10/26/17 201 lb (91.2 kg)              Today, you had the following     No orders found for display         Where to get your medicines      These medications were sent to [facility] [place], [place] - [address]  [address], [place] [address]     Phone:  [phone]     metoprolol 50 MG 24 hr tablet          Primary Care Provider Office Phone # Fax #    Joaquina Goldman [phone] [address]  [facility] [address]        Equal Access to Services     [facility] : Lioc Neff, wanenita asher, qaderrell kaalmada morgan, leonard hall. So [facility] [phone].    ATENCIÓN: Si habla español, tiene a lin disposición servicios gratuitos de asistencia lingüística. Llame al [phone].    We comply with applicable federal civil rights laws and [place] laws. We do not discriminate on the basis of race, color, national origin, age, disability, sex, sexual orientation, or gender identity.            Thank you!     Thank you for choosing [facility]  for your care. Our goal is always to provide you with excellent care. Hearing back from our patients is one way we can continue to improve our services. Please take a few minutes to complete the written survey that you may receive in the mail after your visit with us. Thank you!             Your Updated Medication List - Protect others around you: Learn how to safely use, store and throw away your medicines at www.disposemymeds.org.          This list is accurate as of: 11/27/17  5:47 PM.  Always use your most recent med list.                   Brand Name Dispense Instructions for use Diagnosis    lisinopril 30 MG tablet    PRINIVIL,ZESTRIL    90 tablet    Take 1 tablet (30 mg) by mouth daily    Hypertension goal BP (blood pressure) < 140/90       LORazepam 0.5 MG tablet    ATIVAN    60 tablet     Take 1 tablet (0.5 mg) by mouth 2 times daily as needed    Isolated or specific phobia, Anxiety       metoprolol 50 MG 24 hr tablet    TOPROL-XL    90 tablet    Take 1 tablet (50 mg) by mouth daily    Hypertension goal BP (blood pressure) < 140/90

## 2017-11-28 ASSESSMENT — ANXIETY QUESTIONNAIRES: GAD7 TOTAL SCORE: 0

## 2017-12-12 ENCOUNTER — MYC MEDICAL ADVICE (OUTPATIENT)
Dept: FAMILY MEDICINE | Facility: CLINIC | Age: 47
End: 2017-12-12

## 2017-12-12 DIAGNOSIS — F41.9 ANXIETY: Primary | ICD-10-CM

## 2017-12-12 NOTE — TELEPHONE ENCOUNTER
Writer responded as per below.    Mental health referral pended.    Dr. Goldman-Please review and advise/sign if agree.    Thank you!  TORITO BernardN, RN

## 2017-12-13 ENCOUNTER — OFFICE VISIT (OUTPATIENT)
Dept: FAMILY MEDICINE | Facility: CLINIC | Age: 47
End: 2017-12-13
Payer: COMMERCIAL

## 2017-12-13 VITALS
SYSTOLIC BLOOD PRESSURE: 142 MMHG | DIASTOLIC BLOOD PRESSURE: 99 MMHG | HEART RATE: 65 BPM | OXYGEN SATURATION: 96 % | TEMPERATURE: 97.6 F | BODY MASS INDEX: 29.56 KG/M2 | RESPIRATION RATE: 16 BRPM | WEIGHT: 206 LBS

## 2017-12-13 DIAGNOSIS — F41.1 GENERALIZED ANXIETY DISORDER: Primary | ICD-10-CM

## 2017-12-13 DIAGNOSIS — F32.0 MAJOR DEPRESSIVE DISORDER, SINGLE EPISODE, MILD (H): ICD-10-CM

## 2017-12-13 DIAGNOSIS — I10 HYPERTENSION GOAL BP (BLOOD PRESSURE) < 140/90: ICD-10-CM

## 2017-12-13 PROCEDURE — 99214 OFFICE O/P EST MOD 30 MIN: CPT | Performed by: FAMILY MEDICINE

## 2017-12-13 RX ORDER — CITALOPRAM HYDROBROMIDE 20 MG/1
20 TABLET ORAL DAILY
Qty: 30 TABLET | Refills: 0 | Status: SHIPPED | OUTPATIENT
Start: 2017-12-13 | End: 2017-12-19

## 2017-12-13 ASSESSMENT — ANXIETY QUESTIONNAIRES
5. BEING SO RESTLESS THAT IT IS HARD TO SIT STILL: NOT AT ALL
3. WORRYING TOO MUCH ABOUT DIFFERENT THINGS: SEVERAL DAYS
IF YOU CHECKED OFF ANY PROBLEMS ON THIS QUESTIONNAIRE, HOW DIFFICULT HAVE THESE PROBLEMS MADE IT FOR YOU TO DO YOUR WORK, TAKE CARE OF THINGS AT HOME, OR GET ALONG WITH OTHER PEOPLE: SOMEWHAT DIFFICULT
1. FEELING NERVOUS, ANXIOUS, OR ON EDGE: SEVERAL DAYS
6. BECOMING EASILY ANNOYED OR IRRITABLE: NOT AT ALL
2. NOT BEING ABLE TO STOP OR CONTROL WORRYING: SEVERAL DAYS
GAD7 TOTAL SCORE: 5
7. FEELING AFRAID AS IF SOMETHING AWFUL MIGHT HAPPEN: SEVERAL DAYS

## 2017-12-13 ASSESSMENT — PATIENT HEALTH QUESTIONNAIRE - PHQ9
5. POOR APPETITE OR OVEREATING: SEVERAL DAYS
SUM OF ALL RESPONSES TO PHQ QUESTIONS 1-9: 6

## 2017-12-13 NOTE — PATIENT INSTRUCTIONS
Start citalopram 1/2 tab daily for 2-4 days then increase to full tab daily.      See Kristine Beltrán, Behavioral Health Consultant at [facility].

## 2017-12-13 NOTE — PROGRESS NOTES
SUBJECTIVE:  Lev Copeland, a 47 year old male, is here to discuss the following issues:       ANXIETY FOLLOW-UP   He sent [facility] update yesterday indicating his anxiety had worsened over the past week and he has been using lorazepam on a daily basis.  He also expressed concerns about depression - feeling unmotivated, wanting to just sleep.    He has been using metoprolol for his performance and generalized anxiety (and hypertension).  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    He does not see a therapist.    Sleep:  no problems with sleep    Caffeine: drinks 2 cups coffee/day in am.    PHQ-9 SCORE 8/31/2016 11/6/2017 12/13/2017   Total Score 2 1 6     No flowsheet data found.  CHANDNI-7 SCORE 11/6/2017 11/27/2017 12/13/2017   Total Score 14 0 5       Current Outpatient Prescriptions   Medication Sig Dispense Refill     metoprolol (TOPROL-XL) 50 MG 24 hr tablet Take 1 tablet (50 mg) by mouth daily 90 tablet 3     LORazepam (ATIVAN) 0.5 MG tablet Take 1 tablet (0.5 mg) by mouth 2 times daily as needed 60 tablet 0     lisinopril (PRINIVIL,ZESTRIL) 30 MG tablet Take 1 tablet (30 mg) by mouth daily 90 tablet 3        Problem list and histories reviewed & updated, as indicated.  Patient Active Problem List   Diagnosis     Isolated or specific phobia     Hypertension goal BP (blood pressure) < 140/90     Anxiety       BP Readings from Last 3 Encounters:   12/13/17 (!) 142/99   11/27/17 136/86   11/06/17 126/88    Wt Readings from Last 3 Encounters:   12/13/17 206 lb (93.4 kg)   11/27/17 205 lb 8 oz (93.2 kg)   11/06/17 201 lb (91.2 kg)             OBJECTIVE:    BP (!) 142/99 (BP Location: Right arm)  Pulse 65  Temp 97.6  F (36.4  C) (Oral)  Resp 16  Wt 206 lb (93.4 kg)  SpO2 96%  BMI 29.56 kg/m2  GEN:  no apparent distress  PSYCH:    Appearance: appropriately and casually dressed    Eye Contact: good  Attitude:  cooperative    Speech:  stuttering     Thought Form: organized and goal oriented    Thought Content: no evidence of psychotic thought    Mood: anxious    Affect: mood congruent    Insight: good     ASSESSMENT/PLAN:    ICD-10-CM    1. Generalized anxiety disorder F41.1 citalopram (CELEXA) 20 MG tablet   2. Major depressive disorder, single episode, mild (H) F32.0 citalopram (CELEXA) 20 MG tablet   3. Hypertension goal BP (blood pressure) < 140/90 I10      Discussed that daily use of benzo is not desirable.  I do think he needs a daily SSRI as the metoprolol alone did not adequately control his anxiety.  Discussed role of SSRI medications.  Reviewed potential for initial side effects (such as headache, GI symptoms, and dry mouth) that will likely subside after a week or so, but that therapeutic effects will likely take 1-2 weeks - so it's important to stick with medication for at least a month to adequately gauge effect.  Notify me of any significant side effects or any SI.  Discussed that treatment with SSRI medications requires a minimum commitment of 9-12 months; shorter courses are associated with rebound symptoms.  OK to continue using lorazepam prn while we await onset of therapeutic effect of citalopram.  I also recommended visit with Kristine Beltrán, [facility].  As he is leaving for [place] in 3 weeks he may not be able to meet with Darryl until his return in Feb.      Of note BP is not controlled today but he is anxious and I'll be seeing him back in clinic in 2 weeks.      Return to Clinic in 2 weeks.      Patient Instructions   Start citalopram 1/2 tab daily for 2-4 days then increase to full tab daily.      See Kristine Beltrán, [facility]al Health Consultant at [facility].            Joaquina Goldman MD   [facility]

## 2017-12-13 NOTE — MR AVS SNAPSHOT
After Visit Summary   12/13/2017    Lev Copeland    MRN: [id number]           Patient Information     Date Of Birth          1970        Visit Information        Provider Department      12/13/2017 3:00 PM Joaquina Goldman MD [facility]        Today's Diagnoses     Generalized anxiety disorder    -  1      Care Instructions    Start citalopram 1/2 tab daily for 2-4 days then increase to full tab daily.      See Kristine Beltrán, Behavioral Health Consultant at [facility].                Follow-ups after your visit        Follow-up notes from your care team     Return in about 2 weeks (around 12/27/2017).      Who to contact     If you have questions or need follow up information about today's clinic visit or your schedule please contact [facility] directly at [phone].  Normal or non-critical lab and imaging results will be communicated to you by MyChart, letter or phone within 4 business days after the clinic has received the results. If you do not hear from us within 7 days, please contact the clinic through MyChart or phone. If you have a critical or abnormal lab result, we will notify you by phone as soon as possible.  Submit refill requests through [facility] or call your pharmacy and they will forward the refill request to us. Please allow 3 business days for your refill to be completed.          Additional Information About Your Visit        MyChart Information     [facility] gives you secure access to your electronic health record. If you see a primary care provider, you can also send messages to your care team and make appointments. If you have questions, please call your primary care clinic.  If you do not have a primary care provider, please call [phone] and they will assist you.        Care EveryWhere ID     This is your Care EveryWhere ID. This could be used by other organizations to access your [facility]  records  [id number]        Your Vitals Were     Pulse Temperature Respirations Pulse Oximetry BMI (Body Mass Index)       65 97.6  F (36.4  C) (Oral) 16 96% 29.56 kg/m2        Blood Pressure from Last 3 Encounters:   12/13/17 (!) 142/99   11/27/17 136/86   11/06/17 126/88    Weight from Last 3 Encounters:   12/13/17 206 lb (93.4 kg)   11/27/17 205 lb 8 oz (93.2 kg)   11/06/17 201 lb (91.2 kg)              Today, you had the following     No orders found for display         Today's Medication Changes          These changes are accurate as of: 12/13/17  3:25 PM.  If you have any questions, ask your nurse or doctor.               Start taking these medicines.        Dose/Directions    citalopram 20 MG tablet   Commonly known as:  celeXA   Used for:  Generalized anxiety disorder   Started by:  Joaquina Goldman MD        Dose:  20 mg   Take 1 tablet (20 mg) by mouth daily   Quantity:  30 tablet   Refills:  0            Where to get your medicines      These medications were sent to [facility] [place], [place] - [address]  [address], [facility] [address]     Phone:  [phone]     citalopram 20 MG tablet                Primary Care Provider Office Phone # Fax #    Joaquina Goldman [phone]       [address]  [facility] [address]        Equal Access to Services     [facility]ANJE[facility] AH: Hadii sharon Neff, waaxda luqadaha, qaybta kaalmada cheyenneyada, leonard hatch adeapril hall. So [facility] [phone].    ATENCIÓN: Si habla español, tiene a lin disposición servicios gratuitos de asistencia lingüística. Llame al [phone].    We comply with applicable federal civil rights laws and [place] laws. We do not discriminate on the basis of race, color, national origin, age, disability, sex, sexual orientation, or gender identity.            Thank you!     Thank you for choosing [facility]  for your care. Our goal is always to provide you  with excellent care. Hearing back from our patients is one way we can continue to improve our services. Please take a few minutes to complete the written survey that you may receive in the mail after your visit with us. Thank you!             Your Updated Medication List - Protect others around you: Learn how to safely use, store and throw away your medicines at www.disposemymeds.org.          This list is accurate as of: 12/13/17  3:25 PM.  Always use your most recent med list.                   Brand Name Dispense Instructions for use Diagnosis    citalopram 20 MG tablet    celeXA    30 tablet    Take 1 tablet (20 mg) by mouth daily    Generalized anxiety disorder       lisinopril 30 MG tablet    PRINIVIL,ZESTRIL    90 tablet    Take 1 tablet (30 mg) by mouth daily    Hypertension goal BP (blood pressure) < 140/90       LORazepam 0.5 MG tablet    ATIVAN    60 tablet    Take 1 tablet (0.5 mg) by mouth 2 times daily as needed    Isolated or specific phobia, Anxiety       metoprolol 50 MG 24 hr tablet    TOPROL-XL    90 tablet    Take 1 tablet (50 mg) by mouth daily    Hypertension goal BP (blood pressure) < 140/90, Anxiety

## 2017-12-13 NOTE — NURSING NOTE
"Chief Complaint   Patient presents with     Recheck Medication     Anxiety       Initial BP (!) 142/99 (BP Location: Right arm)  Pulse 65  Temp 97.6  F (36.4  C) (Oral)  Resp 16  Wt 206 lb (93.4 kg)  SpO2 96%  BMI 29.56 kg/m2 Estimated body mass index is 29.56 kg/(m^2) as calculated from the following:    Height as of 10/26/17: 5' 10\" (1.778 m).    Weight as of this encounter: 206 lb (93.4 kg).  Medication Reconciliation: complete     Sandra Gooden CMA      "

## 2017-12-14 ASSESSMENT — ANXIETY QUESTIONNAIRES: GAD7 TOTAL SCORE: 5

## 2017-12-18 ENCOUNTER — TELEPHONE (OUTPATIENT)
Dept: FAMILY MEDICINE | Facility: CLINIC | Age: 47
End: 2017-12-18

## 2017-12-18 NOTE — TELEPHONE ENCOUNTER
The symptoms seem serotonergic and It sounds like the symptoms started before he increased the dose to 20 mg.  In that case I would recommend that he discontinue the citalopram completely.  I'd like for him to be seen tomorrow.  OK to use hold slot with Dr. Gutiérrez tomorrow.  If symptoms worsen tonight (such as vomiting, fever, rapid heart rate) he should go to the ER.  Otherwise he should stay hydrated and can use the lorazepam every 6 hours as needed.      Joaquina Goldman MD

## 2017-12-18 NOTE — TELEPHONE ENCOUNTER
Pt's wife called.  No KENNETH on chart.    Pt was started on citalopram (CELEXA) on 12/13/17.  PT is having all sx on med info of when to call pcp.    He is taking the 20 mg daily.  Sx- upset stomach, sleepy, decreased appetite, diarrhea, shaking, not able to sleep.    Routing to pcp.  Wife says to call her back at [phone]    Upon chart review- Wife did not note the taper up in med schedule.   From 12/13/17 ov-  Start citalopram 1/2 tab daily for 2-4 days then increase to full tab daily.       AUGUSTINE Tamayo

## 2017-12-18 NOTE — TELEPHONE ENCOUNTER
Yes, we need to clarify if these symptoms started on 10 mg (1/2 tab dose) or if he took full dose (20 mg) without titration.    Joaquina Goldman MD

## 2017-12-18 NOTE — TELEPHONE ENCOUNTER
"Patient's wife, Maryann, left voicemail on triage line statin. Patient had been taking 10 mg daily  2. Took first 20 mg dose this AM  3. Symptoms started yesterday AM, and were \"pretty severe\"  4. Patient continues to feel unwell    Dr. Goldman-Please review.    Thank you!  ANGELICA Fleming BSN, RN    "

## 2017-12-19 ENCOUNTER — OFFICE VISIT (OUTPATIENT)
Dept: FAMILY MEDICINE | Facility: CLINIC | Age: 47
End: 2017-12-19
Payer: COMMERCIAL

## 2017-12-19 VITALS
BODY MASS INDEX: 29.13 KG/M2 | OXYGEN SATURATION: 96 % | RESPIRATION RATE: 16 BRPM | SYSTOLIC BLOOD PRESSURE: 135 MMHG | DIASTOLIC BLOOD PRESSURE: 84 MMHG | WEIGHT: 203 LBS | HEART RATE: 66 BPM | TEMPERATURE: 97.3 F

## 2017-12-19 DIAGNOSIS — F32.0 MAJOR DEPRESSIVE DISORDER, SINGLE EPISODE, MILD (H): ICD-10-CM

## 2017-12-19 DIAGNOSIS — I10 HYPERTENSION GOAL BP (BLOOD PRESSURE) < 140/90: ICD-10-CM

## 2017-12-19 DIAGNOSIS — T50.905A ADVERSE EFFECT OF DRUG, INITIAL ENCOUNTER: Primary | ICD-10-CM

## 2017-12-19 DIAGNOSIS — F41.9 ANXIETY: ICD-10-CM

## 2017-12-19 PROCEDURE — 99214 OFFICE O/P EST MOD 30 MIN: CPT | Performed by: FAMILY MEDICINE

## 2017-12-19 ASSESSMENT — ANXIETY QUESTIONNAIRES
2. NOT BEING ABLE TO STOP OR CONTROL WORRYING: SEVERAL DAYS
5. BEING SO RESTLESS THAT IT IS HARD TO SIT STILL: NOT AT ALL
GAD7 TOTAL SCORE: 5
7. FEELING AFRAID AS IF SOMETHING AWFUL MIGHT HAPPEN: SEVERAL DAYS
5. BEING SO RESTLESS THAT IT IS HARD TO SIT STILL: NOT AT ALL
3. WORRYING TOO MUCH ABOUT DIFFERENT THINGS: SEVERAL DAYS
3. WORRYING TOO MUCH ABOUT DIFFERENT THINGS: SEVERAL DAYS
GAD7 TOTAL SCORE: 5
IF YOU CHECKED OFF ANY PROBLEMS ON THIS QUESTIONNAIRE, HOW DIFFICULT HAVE THESE PROBLEMS MADE IT FOR YOU TO DO YOUR WORK, TAKE CARE OF THINGS AT HOME, OR GET ALONG WITH OTHER PEOPLE: SOMEWHAT DIFFICULT
1. FEELING NERVOUS, ANXIOUS, OR ON EDGE: SEVERAL DAYS
2. NOT BEING ABLE TO STOP OR CONTROL WORRYING: SEVERAL DAYS
6. BECOMING EASILY ANNOYED OR IRRITABLE: NOT AT ALL
1. FEELING NERVOUS, ANXIOUS, OR ON EDGE: SEVERAL DAYS
6. BECOMING EASILY ANNOYED OR IRRITABLE: NOT AT ALL
7. FEELING AFRAID AS IF SOMETHING AWFUL MIGHT HAPPEN: SEVERAL DAYS

## 2017-12-19 ASSESSMENT — PATIENT HEALTH QUESTIONNAIRE - PHQ9
5. POOR APPETITE OR OVEREATING: SEVERAL DAYS
SUM OF ALL RESPONSES TO PHQ QUESTIONS 1-9: 7
5. POOR APPETITE OR OVEREATING: SEVERAL DAYS

## 2017-12-19 NOTE — PROGRESS NOTES
"  SUBJECTIVE:   Lev Copeland is a 47 year old male who presents to clinic today for the following health issues:    Hypertension Follow-up    Outpatient blood pressures are being checked at home.  Results are 130/90's.    Low Salt Diet: low salt    Depression and Anxiety Follow-Up    Status since last visit: No change    Other associated symptoms:meds side effects    Complicating factors:     Significant life event: No     Current substance abuse: None    PHQ-9 Score and MyChart F/U Questions 12/13/2017 12/19/2017 12/19/2017   Total Score 6 7 7   Q9: Suicide Ideation Not at all Not at all Not at all     CHANDNI-7 SCORE 12/13/2017 12/19/2017 12/19/2017   Total Score 5 5 5     PHQ-9  English  PHQ-9   Any Language  GAD7  Suicide Assessment Five-step Evaluation and Treatment (SAFE-T)      Amount of exercise or physical activity: 1 day/week for an average of 15-30 minutes    Problems taking medications regularly: No    Medication side effects: tried, unable to sleep well, dry mouth, headaches and lost appetite     Diet: low salt    Clinic on 12/13/17 Dr. Goldman:   \"ASSESSMENT/PLAN:      ICD-10-CM     1. Generalized anxiety disorder F41.1 citalopram (CELEXA) 20 MG tablet   2. Major depressive disorder, single episode, mild (H) F32.0 citalopram (CELEXA) 20 MG tablet   3. Hypertension goal BP (blood pressure) < 140/90 I10     Discussed that daily use of benzo is not desirable.  I do think he needs a daily SSRI as the metoprolol alone did not adequately control his anxiety.  Discussed role of SSRI medications.  Reviewed potential for initial side effects (such as headache, GI symptoms, and dry mouth) that will likely subside after a week or so, but that therapeutic effects will likely take 1-2 weeks - so it's important to stick with medication for at least a month to adequately gauge effect.  Notify me of any significant side effects or any SI.  Discussed that treatment with SSRI medications requires a minimum commitment of " "9-12 months; shorter courses are associated with rebound symptoms.  OK to continue using lorazepam prn while we await onset of therapeutic effect of citalopram.  I also recommended visit with Kristine Beltrán, [facility].  As he is leaving for [place] in 3 weeks he may not be able to meet with Darryl until his return in Feb.    Of note BP is not controlled today but he is anxious and I'll be seeing him back in clinic in 2 weeks.    Return to Clinic in 2 weeks.\"       He was prescribed citalopram and started on the half dose (10 mg). Yesterday was his first full dose. Saturday he noticed that he felt extremely tired, could have stayed in bed all day, and was not sleeping well. Sunday he woke up and had a headache. He took a few Tylenol, felt nauseous, stayed in bed until 2:30 pm, got up despite still not feeling well. He had decreased appetite and dry mouth. Yesterday he had a slight headache. He woke up at 7:30 am yesterday, got up, was making breakfast, and felt overwhelmed, restless. He laid down again and his wife called the nurse line. He took Lorazepam then, went to work around noon, and felt decent the rest of the day. Last night he got the call to stop the medication. He did not take it today and feels fine. He felt like he got a good nights sleep, woke up rested, and has not felt tired. No hand tremor. He felt warm- Sunday morning they checked his temp and it was normal.    Patient also experienced diarrhea this weekend.    The only other medication he took this weekend was his Tylenol and supplements. No decongestant.       Problem list and histories reviewed & adjusted, as indicated.  Additional history: as documented  Patient Active Problem List   Diagnosis     Isolated or specific phobia     Hypertension goal BP (blood pressure) < 140/90     Anxiety     Major depressive disorder, single episode, mild (H)     Past Surgical History:   Procedure Laterality Date     NO HISTORY OF SURGERY   "       Social History   Substance Use Topics     Smoking status: Never Smoker     Smokeless tobacco: Never Used     Alcohol use Yes      Comment: 5-6 Drinks per week     Family History   Problem Relation Age of Onset     HEART DISEASE Father      TETE x4 at age 63     Hypertension Sister          Current Outpatient Prescriptions   Medication Sig Dispense Refill     metoprolol (TOPROL-XL) 50 MG 24 hr tablet Take 1 tablet (50 mg) by mouth daily 90 tablet 3     LORazepam (ATIVAN) 0.5 MG tablet Take 1 tablet (0.5 mg) by mouth 2 times daily as needed 60 tablet 0     lisinopril (PRINIVIL,ZESTRIL) 30 MG tablet Take 1 tablet (30 mg) by mouth daily 90 tablet 3     citalopram (CELEXA) 20 MG tablet Take 1 tablet (20 mg) by mouth daily (Patient not taking: Reported on 12/19/2017) 30 tablet 0     No Known Allergies  Recent Labs   Lab Test  10/26/17   1617  03/02/17   0925  02/14/16   1240  02/04/16   0854  02/05/15   1009  02/06/14   1009   LDL   --   116*   --   127*  73   --    HDL   --   52   --   57  58   --    TRIG   --   148   --   125  132   --    ALT   --    --    --    --    --   42   CR   --   1.00  0.88  1.02  1.04  1.01   GFRESTIMATED   --   80  >90  Non  GFR Calc    78  77  80   GFRESTBLACK   --   >90   GFR Calc    >90   GFR Calc    >90   GFR Calc    >90   GFR Calc    >90   POTASSIUM   --   3.8  3.4  3.4  3.7  3.9   TSH  1.82   --    --    --    --   1.46      BP Readings from Last 3 Encounters:   12/19/17 135/84   12/13/17 (!) 142/99   11/27/17 136/86    Wt Readings from Last 3 Encounters:   12/19/17 92.1 kg (203 lb)   12/13/17 93.4 kg (206 lb)   11/27/17 93.2 kg (205 lb 8 oz)        Reviewed and updated as needed this visit by clinical staffTobacco  Allergies  Meds  Med Hx  Surg Hx  Fam Hx  Soc Hx      Reviewed and updated as needed this visit by Provider         ROS:  Denies suicidal ideation.     This document serves as a record  of the services and decisions personally performed and made by Joaquina Gutiérrez MD. It was created on his/her behalf by Annalise Lama, trained medical scribe. The creation of this document is based the provider's statements to the medical scribes.    Scribe Annalise Lama, December 19, 2017  OBJECTIVE:     /84 (BP Location: Left arm, Patient Position: Chair, Cuff Size: Adult Regular)  Pulse 66  Temp 97.3  F (36.3  C) (Oral)  Resp 16  Wt 92.1 kg (203 lb)  SpO2 96%  BMI 29.13 kg/m2  Body mass index is 29.13 kg/(m^2).  GENERAL: healthy, alert and no distress  EYES: Eyes grossly normal to inspection, PERRL and conjunctivae and sclerae normal  RESP: lungs clear to auscultation - no rales, rhonchi or wheezes  CV: regular rate and rhythm, normal S1 S2, no S3 or S4, no murmur, click or rub  NEURO: Normal strength and tone, mentation intact and speech normal, bilateral upper/lower extremity reflexes intact, no tremor, speech-has a stutter which is baseline for pt  PSYCH: mentation appears normal, affect normal     Diagnostic Test Results:  none     ASSESSMENT/PLAN:   1. Adverse effect of drug, initial encounter  Symptoms concerning for possible serotonin syndrome, symptoms started 3 days after starting citalopram at 10mg dose, then increased dose to 20mg yesterday. Per UpToDate, most episodes of serotonin syndrome begin within 24 hours of starting med or dose change. Pt has stopped citalopram per Dr. Goldman--last dose 20mg yesterday morning. He is feeling better today--no longer restless and tired. Exam normal today. VSS. He will remain off the medication for now. He continues the lorazepam as needed and will follow up with Dr. Goldman next week. He will avoid taking OTC medications or supplements in the meantime.      2. Anxiety  See above. Will f/u with PCP next week for medication plan.     3. Major depressive disorder, single episode, mild (H)  As above     4. Hypertension goal BP (blood pressure) <  140/90  Controlled. Continues on metoprolol 50 mg and lisinopril 30 mg daily.        Patient Instructions     1. Stay off Celexa for now.   2. You can use Lorazepam as needed per instructions in the meantime for anxiety.  3. Follow up with Dr. Goldman next week.  Call if you are having return of any concerning symptoms (see below)     Selective Serotonin Reuptake Inhibitors  Your healthcare provider prescribed a selective serotonin reuptake inhibitor (SSRI) for you. An SSRI is an antidepressant. SSRIs help reduce the extreme sadness, hopelessness, and lack of interest in life that are typical in people with depression. SSRIs are also used to treat panic disorder and obsessive compulsive disorder (OCD).     The name of my SSRI is ____________________________________________.   Guidelines for use    Follow the fact sheet that came with your medicine. It tells you when and how to take your medicine. Ask for a sheet if you didn t get one.    Before starting your medicine, tell your healthcare provider if you have:    Manic depression or bipolar disorder    Kidney disease    Thyroid disease    Diabetes    Liver disease    Seizure disorders    A history or current problem with drug abuse or dependence    Tell your healthcare provider about any other medicines you are taking. This includes over-the-counter or herbal medicines.    Take your medicine exactly as directed. This medicine takes several weeks to reach its full effect. Because of this, it is important to take this medicine every day, even if you believe that it is not helping your symptoms. You may need to take this medicine for a few months. Or you may need to take it for the rest of your life. It depends on your symptoms.    If you miss a dose, take it as soon as you remember--unless it s almost time for your next dose. In that case, skip the dose you missed. Don t take a double dose.    Take your medicine with food.    Limit alcohol intake while taking this  medicine. Or if possible, don t have any alcohol at all while taking this medicine.    Don t take an SSRI if you are currently taking a monoamine oxidase inhibitor (MAO inhibitor).    Don t stop taking your medicine without talking with your healthcare provider. If you wish to stop taking your SSRI, your healthcare provider will need to help you reduce the medicine slowly.    Before using new over-the-counter medicines, check with the pharmacist to be sure it will not interact with the SSRI.    Don t share your medicine or use another person's medicine, even if it is the same medicine and dose. Check with your provider if you have trouble affording your prescription.  Possible side effects  Tell your healthcare provider if you have any of these side effects. Don t stop taking the medicine until your healthcare provider tells you to. Mild side effects include:    Anxiety    Trouble sleeping    Nausea    Diarrhea    Headaches    Loss of sex drive or problems with orgasm    Sweating     When to call your healthcare provider  Call your healthcare provider right away if you have any of the following:    Increased feelings of depression    Unusual joint or muscle pain    Trouble breathing    Shaking chills    Excessive excitement    Trouble controlling your emotions or actions    Skin rash    Hives    Tremors   Date Last Reviewed: 5/1/2017 2000-2017 [employer]. [address] [place] [address]. All rights reserved. This information is not intended as a substitute for professional medical care. Always follow your healthcare professional's instructions.            The information in this document, created by the medical scribe for me, accurately reflects the services I personally performed and the decisions made by me. I have reviewed and approved this document for accuracy. 12/19/17    Joaquina Gutiérrez MD  [facility]

## 2017-12-19 NOTE — MR AVS SNAPSHOT
After Visit Summary   12/19/2017    Lev Copeland    MRN: [id number]           Patient Information     Date Of Birth          1970        Visit Information        Provider Department      12/19/2017 2:00 PM Joaquina Gutiérrez MD [facility]        Today's Diagnoses     Adverse effect of drug, initial encounter    -  1    Anxiety        Major depressive disorder, single episode, mild (H)        Hypertension goal BP (blood pressure) < 140/90          Care Instructions    1. Stay off Celexa for now.   2. You can use Lorazepam as needed per instructions in the meantime for anxiety.  3. Follow up with Dr. Goldman next week.  Call if you are having return of any concerning symptoms (see below)     Selective Serotonin Reuptake Inhibitors  Your healthcare provider prescribed a selective serotonin reuptake inhibitor (SSRI) for you. An SSRI is an antidepressant. SSRIs help reduce the extreme sadness, hopelessness, and lack of interest in life that are typical in people with depression. SSRIs are also used to treat panic disorder and obsessive compulsive disorder (OCD).     The name of my SSRI is ____________________________________________.   Guidelines for use    Follow the fact sheet that came with your medicine. It tells you when and how to take your medicine. Ask for a sheet if you didn t get one.    Before starting your medicine, tell your healthcare provider if you have:    Manic depression or bipolar disorder    Kidney disease    Thyroid disease    Diabetes    Liver disease    Seizure disorders    A history or current problem with drug abuse or dependence    Tell your healthcare provider about any other medicines you are taking. This includes over-the-counter or herbal medicines.    Take your medicine exactly as directed. This medicine takes several weeks to reach its full effect. Because of this, it is important to take this medicine every day, even if you believe that it is not  helping your symptoms. You may need to take this medicine for a few months. Or you may need to take it for the rest of your life. It depends on your symptoms.    If you miss a dose, take it as soon as you remember--unless it s almost time for your next dose. In that case, skip the dose you missed. Don t take a double dose.    Take your medicine with food.    Limit alcohol intake while taking this medicine. Or if possible, don t have any alcohol at all while taking this medicine.    Don t take an SSRI if you are currently taking a monoamine oxidase inhibitor (MAO inhibitor).    Don t stop taking your medicine without talking with your healthcare provider. If you wish to stop taking your SSRI, your healthcare provider will need to help you reduce the medicine slowly.    Before using new over-the-counter medicines, check with the pharmacist to be sure it will not interact with the SSRI.    Don t share your medicine or use another person's medicine, even if it is the same medicine and dose. Check with your provider if you have trouble affording your prescription.  Possible side effects  Tell your healthcare provider if you have any of these side effects. Don t stop taking the medicine until your healthcare provider tells you to. Mild side effects include:    Anxiety    Trouble sleeping    Nausea    Diarrhea    Headaches    Loss of sex drive or problems with orgasm    Sweating     When to call your healthcare provider  Call your healthcare provider right away if you have any of the following:    Increased feelings of depression    Unusual joint or muscle pain    Trouble breathing    Shaking chills    Excessive excitement    Trouble controlling your emotions or actions    Skin rash    Hives    Tremors   Date Last Reviewed: 5/1/2017 2000-2017 [employer]. [address] [place] [address], [place] [address]. All rights reserved. This information is not intended as a substitute for professional medical care. Always  follow your healthcare professional's instructions.                Follow-ups after your visit        Your next 10 appointments already scheduled     Dec 27, 2017  8:40 AM CST   Office Visit with Joaquina Goldman MD   [facility] ([facility])    [address] [place] [id number] [phone]           Bring a current list of meds and any records pertaining to this visit. For Physicals, please bring immunization records and any forms needing to be filled out. Please arrive 10 minutes early to complete paperwork.              Who to contact     If you have questions or need follow up information about today's clinic visit or your schedule please contact [facility] directly at [phone].  Normal or non-critical lab and imaging results will be communicated to you by MyChart, letter or phone within 4 business days after the clinic has received the results. If you do not hear from us within 7 days, please contact the clinic through [employer] or phone. If you have a critical or abnormal lab result, we will notify you by phone as soon as possible.  Submit refill requests through [employer] or call your pharmacy and they will forward the refill request to us. Please allow 3 business days for your refill to be completed.          Additional Information About Your Visit        [employer] Information     [employer] gives you secure access to your electronic health record. If you see a primary care provider, you can also send messages to your care team and make appointments. If you have questions, please call your primary care clinic.  If you do not have a primary care provider, please call [phone] and they will assist you.        Care EveryWhere ID     This is your Care EveryWhere ID. This could be used by other organizations to access your [place] medical records  [id number]        Your Vitals Were     Pulse Temperature Respirations Pulse Oximetry BMI (Body Mass  Index)       66 97.3  F (36.3  C) (Oral) 16 96% 29.13 kg/m2        Blood Pressure from Last 3 Encounters:   12/19/17 135/84   12/13/17 (!) 142/99   11/27/17 136/86    Weight from Last 3 Encounters:   12/19/17 203 lb (92.1 kg)   12/13/17 206 lb (93.4 kg)   11/27/17 205 lb 8 oz (93.2 kg)              Today, you had the following     No orders found for display       Primary Care Provider Office Phone # Fax #    Joaquina Goldman [phone] [address]  [facility] [address]        Equal Access to Services     ISAIAS SEXTON : Lico Neff, wanenita asher, qaybta kaalmada morgan, leonard hall. So [facility] [phone].    ATENCIÓN: Si habla español, tiene a lin disposición servicios gratuitos de asistencia lingüística. Llame al [phone].    We comply with applicable federal civil rights laws and [place] laws. We do not discriminate on the basis of race, color, national origin, age, disability, sex, sexual orientation, or gender identity.            Thank you!     Thank you for choosing [facility]  for your care. Our goal is always to provide you with excellent care. Hearing back from our patients is one way we can continue to improve our services. Please take a few minutes to complete the written survey that you may receive in the mail after your visit with us. Thank you!             Your Updated Medication List - Protect others around you: Learn how to safely use, store and throw away your medicines at www.disposemymeds.org.          This list is accurate as of: 12/19/17  2:23 PM.  Always use your most recent med list.                   Brand Name Dispense Instructions for use Diagnosis    citalopram 20 MG tablet    celeXA    30 tablet    Take 1 tablet (20 mg) by mouth daily    Generalized anxiety disorder, Major depressive disorder, single episode, mild (H)       lisinopril 30 MG tablet    PRINIVIL,ZESTRIL    90 tablet    Take  1 tablet (30 mg) by mouth daily    Hypertension goal BP (blood pressure) < 140/90       LORazepam 0.5 MG tablet    ATIVAN    60 tablet    Take 1 tablet (0.5 mg) by mouth 2 times daily as needed    Isolated or specific phobia, Anxiety       metoprolol 50 MG 24 hr tablet    TOPROL-XL    90 tablet    Take 1 tablet (50 mg) by mouth daily    Hypertension goal BP (blood pressure) < 140/90, Anxiety

## 2017-12-19 NOTE — PATIENT INSTRUCTIONS
1. Stay off Celexa for now.   2. You can use Lorazepam as needed per instructions in the meantime for anxiety.  3. Follow up with Dr. Goldman next week.  Call if you are having return of any concerning symptoms (see below)     Selective Serotonin Reuptake Inhibitors  Your healthcare provider prescribed a selective serotonin reuptake inhibitor (SSRI) for you. An SSRI is an antidepressant. SSRIs help reduce the extreme sadness, hopelessness, and lack of interest in life that are typical in people with depression. SSRIs are also used to treat panic disorder and obsessive compulsive disorder (OCD).     The name of my SSRI is ____________________________________________.   Guidelines for use    Follow the fact sheet that came with your medicine. It tells you when and how to take your medicine. Ask for a sheet if you didn t get one.    Before starting your medicine, tell your healthcare provider if you have:    Manic depression or bipolar disorder    Kidney disease    Thyroid disease    Diabetes    Liver disease    Seizure disorders    A history or current problem with drug abuse or dependence    Tell your healthcare provider about any other medicines you are taking. This includes over-the-counter or herbal medicines.    Take your medicine exactly as directed. This medicine takes several weeks to reach its full effect. Because of this, it is important to take this medicine every day, even if you believe that it is not helping your symptoms. You may need to take this medicine for a few months. Or you may need to take it for the rest of your life. It depends on your symptoms.    If you miss a dose, take it as soon as you remember unless it s almost time for your next dose. In that case, skip the dose you missed. Don t take a double dose.    Take your medicine with food.    Limit alcohol intake while taking this medicine. Or if possible, don t have any alcohol at all while taking this medicine.    Don t take an SSRI if you  are currently taking a monoamine oxidase inhibitor (MAO inhibitor).    Don t stop taking your medicine without talking with your healthcare provider. If you wish to stop taking your SSRI, your healthcare provider will need to help you reduce the medicine slowly.    Before using new over-the-counter medicines, check with the pharmacist to be sure it will not interact with the SSRI.    Don t share your medicine or use another person's medicine, even if it is the same medicine and dose. Check with your provider if you have trouble affording your prescription.  Possible side effects  Tell your healthcare provider if you have any of these side effects. Don t stop taking the medicine until your healthcare provider tells you to. Mild side effects include:    Anxiety    Trouble sleeping    Nausea    Diarrhea    Headaches    Loss of sex drive or problems with orgasm    Sweating     When to call your healthcare provider  Call your healthcare provider right away if you have any of the following:    Increased feelings of depression    Unusual joint or muscle pain    Trouble breathing    Shaking chills    Excessive excitement    Trouble controlling your emotions or actions    Skin rash    Hives    Tremors   Date Last Reviewed: 5/1/2017 2000-2017 [employer]. [address] [place] [address], [place] [address]. All rights reserved. This information is not intended as a substitute for professional medical care. Always follow your healthcare professional's instructions.

## 2017-12-20 ASSESSMENT — ANXIETY QUESTIONNAIRES: GAD7 TOTAL SCORE: 5

## 2017-12-26 NOTE — PROGRESS NOTES
SUBJECTIVE:  Lev Copeland, a 47 year old male, is here to discuss the following issues:     DEPRESSION AND ANXIETY FOLLOW-UP   He has longstanding performance anxiety for which he has taken ativan prn.  More recently he developed CHANDNI and mild MDD.  When I saw him a few weeks ago we decided to have him start citalopram - titrating dose from 10 to 20 mg daily.  However, after a few days he developed significant side effects that were concerning for possible serotonin syndrome: restlessness, nausea, diarrhea, headache.  I instructed him to stop the citalopram and he did see Dr. Gutiérrez within 24 hours.    At that time he had no sign of hyperreflexia or abnormal vital signs and his other symptoms were subsiding.  He reports they completely resolved within a couple days of discontinuing citalolpram.    She instructed him to continue to hold the citalopram and to use the ativan to manage his anxiety.    He continues to take ativan prn with recent use being typically once daily but not every day.       He describes his mood as ok.  He did see therapist at [facility] and found it helpful; he plans to do further sessions with her  He is leaving next week for [place] in [place]    PHQ-9 SCORE 12/19/2017 12/19/2017 12/27/2017   Total Score 7 7 5     No flowsheet data found.  CHANDNI-7 SCORE 12/19/2017 12/19/2017 12/27/2017   Total Score 5 5 2          HYPERTENSION FOLLOW-UP    Current medication regimen includes toprol 50 QD and lisinopril 30 QD.  BP has been borderline high and we discussed options of switching to lisinopril-HCTZ or increasing toprol dose.   Patient reports no problems or side effects with the medication.      Problem list and histories reviewed & updated, as indicated.  Patient Active Problem List   Diagnosis     Isolated or specific phobia     Hypertension goal BP (blood pressure) < 140/90     Anxiety     Major depressive disorder, single episode, mild (H)       BP Readings from Last 3 Encounters:    12/27/17 (!) 140/94   12/19/17 135/84   12/13/17 (!) 142/99    Wt Readings from Last 3 Encounters:   12/27/17 205 lb (93 kg)   12/19/17 203 lb (92.1 kg)   12/13/17 206 lb (93.4 kg)              OBJECTIVE:  BP (!) 140/94  Pulse 70  Temp 98  F (36.7  C) (Oral)  Resp 18  Wt 205 lb (93 kg)  SpO2 96%  BMI 29.41 kg/m2  GEN:  no apparent distress  PSYCH:    Appearance: appropriately and casually dressed    Eye Contact: good  Attitude: cooperative    Speech:  stutter - baseline    Thought Form: organized and goal oriented    Thought Content: no evidence of psychotic thought    Mood: better    Affect: appropriate and in normal range    Insight: good     ASSESSMENT/PLAN:  1. Major depressive disorder, single episode, mild (H)  He does not want to start any new medications now just before leaving for a month in [place].  He'll continue to manage this with therapy.  He plans to see therapist again when he returns from [place].  Discussed that we could consider another trial of SSRI or SNRI if needed in the future but that I agree that we should not start anything now.    - DEPRESSION ACTION PLAN (DAP)    2. Isolated or specific phobia  3. Anxiety  Discussed safe parameters for benzo use.  I did refill for his upcoming trip and understand he may need to use this more frequently for the next few weeks, but discussed that on a regular basis goal should be to use this no more than 3 times per week.    - LORazepam (ATIVAN) 0.5 MG tablet; Take 1 tablet (0.5 mg) by mouth 2 times daily as needed  Dispense: 60 tablet; Refill: 0    4. Hypertension goal BP (blood pressure) < 140/90  Running high today.  Again he does not want to make any changes right before leaving the country.  We'll revisit this when he returns.             Joaquina Goldman MD   [facility]

## 2017-12-27 ENCOUNTER — OFFICE VISIT (OUTPATIENT)
Dept: FAMILY MEDICINE | Facility: CLINIC | Age: 47
End: 2017-12-27
Payer: COMMERCIAL

## 2017-12-27 VITALS
BODY MASS INDEX: 29.41 KG/M2 | HEART RATE: 70 BPM | RESPIRATION RATE: 18 BRPM | SYSTOLIC BLOOD PRESSURE: 140 MMHG | DIASTOLIC BLOOD PRESSURE: 94 MMHG | OXYGEN SATURATION: 96 % | TEMPERATURE: 98 F | WEIGHT: 205 LBS

## 2017-12-27 DIAGNOSIS — I10 HYPERTENSION GOAL BP (BLOOD PRESSURE) < 140/90: ICD-10-CM

## 2017-12-27 DIAGNOSIS — F40.298 ISOLATED OR SPECIFIC PHOBIA: ICD-10-CM

## 2017-12-27 DIAGNOSIS — F32.0 MAJOR DEPRESSIVE DISORDER, SINGLE EPISODE, MILD (H): Primary | ICD-10-CM

## 2017-12-27 DIAGNOSIS — F41.9 ANXIETY: ICD-10-CM

## 2017-12-27 PROCEDURE — 99214 OFFICE O/P EST MOD 30 MIN: CPT | Performed by: FAMILY MEDICINE

## 2017-12-27 RX ORDER — LORAZEPAM 0.5 MG/1
0.5 TABLET ORAL 2 TIMES DAILY PRN
Qty: 60 TABLET | Refills: 0 | Status: SHIPPED | OUTPATIENT
Start: 2017-12-27 | End: 2018-07-09

## 2017-12-27 ASSESSMENT — ANXIETY QUESTIONNAIRES
IF YOU CHECKED OFF ANY PROBLEMS ON THIS QUESTIONNAIRE, HOW DIFFICULT HAVE THESE PROBLEMS MADE IT FOR YOU TO DO YOUR WORK, TAKE CARE OF THINGS AT HOME, OR GET ALONG WITH OTHER PEOPLE: NOT DIFFICULT AT ALL
1. FEELING NERVOUS, ANXIOUS, OR ON EDGE: SEVERAL DAYS
2. NOT BEING ABLE TO STOP OR CONTROL WORRYING: NOT AT ALL
3. WORRYING TOO MUCH ABOUT DIFFERENT THINGS: SEVERAL DAYS
GAD7 TOTAL SCORE: 2
7. FEELING AFRAID AS IF SOMETHING AWFUL MIGHT HAPPEN: NOT AT ALL
6. BECOMING EASILY ANNOYED OR IRRITABLE: NOT AT ALL
5. BEING SO RESTLESS THAT IT IS HARD TO SIT STILL: NOT AT ALL

## 2017-12-27 ASSESSMENT — PATIENT HEALTH QUESTIONNAIRE - PHQ9
5. POOR APPETITE OR OVEREATING: NOT AT ALL
SUM OF ALL RESPONSES TO PHQ QUESTIONS 1-9: 5

## 2017-12-27 NOTE — MR AVS SNAPSHOT
After Visit Summary   12/27/2017    Lev Copeland    MRN: [id number]           Patient Information     Date Of Birth          1970        Visit Information        Provider Department      12/27/2017 8:40 AM Joaquina Goldman MD [facility]        Today's Diagnoses     Major depressive disorder, single episode, mild (H)    -  1    Isolated or specific phobia        Anxiety        Hypertension goal BP (blood pressure) < 140/90           Follow-ups after your visit        Who to contact     If you have questions or need follow up information about today's clinic visit or your schedule please contact [facility] directly at [phone].  Normal or non-critical lab and imaging results will be communicated to you by MyChart, letter or phone within 4 business days after the clinic has received the results. If you do not hear from us within 7 days, please contact the clinic through [employer] or phone. If you have a critical or abnormal lab result, we will notify you by phone as soon as possible.  Submit refill requests through [employer] or call your pharmacy and they will forward the refill request to us. Please allow 3 business days for your refill to be completed.          Additional Information About Your Visit        MyChart Information     [employer] gives you secure access to your electronic health record. If you see a primary care provider, you can also send messages to your care team and make appointments. If you have questions, please call your primary care clinic.  If you do not have a primary care provider, please call [phone] and they will assist you.        Care EveryWhere ID     This is your Care EveryWhere ID. This could be used by other organizations to access your [place] medical records  [id number]        Your Vitals Were     Pulse Temperature Respirations Pulse Oximetry BMI (Body Mass Index)       70 98  F (36.7  C) (Oral) 18 96% 29.41 kg/m2         Blood Pressure from Last 3 Encounters:   12/27/17 (!) 140/94   12/19/17 135/84   12/13/17 (!) 142/99    Weight from Last 3 Encounters:   12/27/17 205 lb (93 kg)   12/19/17 203 lb (92.1 kg)   12/13/17 206 lb (93.4 kg)              We Performed the Following     DEPRESSION ACTION PLAN (DAP)          Where to get your medicines      Some of these will need a paper prescription and others can be bought over the counter.  Ask your nurse if you have questions.     Bring a paper prescription for each of these medications     LORazepam 0.5 MG tablet          Primary Care Provider Office Phone # Fax #    Joaquina Goldman [phone] [address] [place] [id number]        Equal Access to Services     ISAIAS SEXTON : Lico shields Soneil, waaxda luqadaha, qaybta kaalmada cheyenneyamurray, leonard hsu . So [facility] [phone].    ATENCIÓN: Si habla español, tiene a lin disposición servicios gratuitos de asistencia lingüística. Llame al [phone].    We comply with applicable federal civil rights laws and [place] laws. We do not discriminate on the basis of race, color, national origin, age, disability, sex, sexual orientation, or gender identity.            Thank you!     Thank you for choosing [facility]  for your care. Our goal is always to provide you with excellent care. Hearing back from our patients is one way we can continue to improve our services. Please take a few minutes to complete the written survey that you may receive in the mail after your visit with us. Thank you!             Your Updated Medication List - Protect others around you: Learn how to safely use, store and throw away your medicines at www.disposemymeds.org.          This list is accurate as of: 12/27/17 10:02 AM.  Always use your most recent med list.                   Brand Name Dispense Instructions for use Diagnosis    lisinopril 30 MG tablet    PRINIVIL,ZESTRIL    90  tablet    Take 1 tablet (30 mg) by mouth daily    Hypertension goal BP (blood pressure) < 140/90       LORazepam 0.5 MG tablet    ATIVAN    60 tablet    Take 1 tablet (0.5 mg) by mouth 2 times daily as needed    Isolated or specific phobia, Anxiety       metoprolol 50 MG 24 hr tablet    TOPROL-XL    90 tablet    Take 1 tablet (50 mg) by mouth daily    Hypertension goal BP (blood pressure) < 140/90, Anxiety

## 2017-12-27 NOTE — NURSING NOTE
"Chief Complaint   Patient presents with     Anxiety     Depression       Initial BP (!) 140/94  Pulse 70  Temp 98  F (36.7  C) (Oral)  Resp 18  Wt 205 lb (93 kg)  SpO2 96%  BMI 29.41 kg/m2 Estimated body mass index is 29.41 kg/(m^2) as calculated from the following:    Height as of 10/26/17: 5' 10\" (1.778 m).    Weight as of this encounter: 205 lb (93 kg).  Medication Reconciliation: complete     Jenae Harding MA      "

## 2017-12-27 NOTE — LETTER
My Depression Action Plan  Name: Lev Copeland   Date of Birth 1970  Date: 12/27/2017    My doctor: Joaquina Goldman   My clinic: [address] [address] [phone]          GREEN    ZONE   Good Control    What it looks like:     Things are going generally well. You have normal up s and down s. You may even feel depressed from time to time, but bad moods usually last less than a day.   What you need to do:  1. Continue to care for yourself (see self care plan)  2. Check your depression survival kit and update it as needed  3. Follow your physician s recommendations including any medication.  4. Do not stop taking medication unless you consult with your physician first.           YELLOW         ZONE Getting Worse    What it looks like:     Depression is starting to interfere with your life.     It may be hard to get out of bed; you may be starting to isolate yourself from others.    Symptoms of depression are starting to last most all day and this has happened for several days.     You may have suicidal thoughts but they are not constant.   What you need to do:     1. Call your care team, your response to treatment will improve if you keep your care team informed of your progress. Yellow periods are signs an adjustment may need to be made.     2. Continue your self-care, even if you have to fake it!    3. Talk to someone in your support network    4. Open up your depression survival kit           RED    ZONE Medical Alert - Get Help    What it looks like:     Depression is seriously interfering with your life.     You may experience these or other symptoms: You can t get out of bed most days, can t work or engage in other necessary activities, you have trouble taking care of basic hygiene, or basic responsibilities, thoughts of suicide or death that will not go away, self-injurious behavior.     What you need to do:  1. Call your care team  and request a same-day appointment. If they are not available (weekends or after hours) call your local crisis line, emergency room or 911.      Electronically signed by: Jenae Harding, December 27, 2017    Depression Self Care Plan / Survival Kit    Self-Care for Depression  Here s the deal. Your body and mind are really not as separate as most people think.  What you do and think affects how you feel and how you feel influences what you do and think. This means if you do things that people who feel good do, it will help you feel better.  Sometimes this is all it takes.  There is also a place for medication and therapy depending on how severe your depression is, so be sure to consult with your medical provider and/ or Behavioral Health Consultant if your symptoms are worsening or not improving.     In order to better manage my stress, I will:    Exercise  Get some form of exercise, every day. This will help reduce pain and release endorphins, the  feel good  chemicals in your brain. This is almost as good as taking antidepressants!  This is not the same as joining a gym and then never going! (they count on that by the way ) It can be as simple as just going for a walk or doing some gardening, anything that will get you moving.      Hygiene   Maintain good hygiene (Get out of bed in the morning, Make your bed, Brush your teeth, Take a shower, and Get dressed like you were going to work, even if you are unemployed).  If your clothes don't fit try to get ones that do.    Diet  I will strive to eat foods that are good for me, drink plenty of water, and avoid excessive sugar, caffeine, alcohol, and other mood-altering substances.  Some foods that are helpful in depression are: complex carbohydrates, B vitamins, flaxseed, fish or fish oil, fresh fruits and vegetables.    Psychotherapy  I agree to participate in Individual Therapy (if recommended).    Medication  If prescribed medications, I agree to take them.  Missing  doses can result in serious side effects.  I understand that drinking alcohol, or other illicit drug use, may cause potential side effects.  I will not stop my medication abruptly without first discussing it with my provider.    Staying Connected With Others  I will stay in touch with my friends, family members, and my primary care provider/team.    Use your imagination  Be creative.  We all have a creative side; it doesn t matter if it s oil painting, sand castles, or mud pies! This will also kick up the endorphins.    Witness Beauty  (AKA stop and smell the roses) Take a look outside, even in mid-winter. Notice colors, textures. Watch the squirrels and birds.     Service to others  Be of service to others.  There is always someone else in need.  By helping others we can  get out of ourselves  and remember the really important things.  This also provides opportunities for practicing all the other parts of the program.    Humor  Laugh and be silly!  Adjust your TV habits for less news and crime-drama and more comedy.    Control your stress  Try breathing deep, massage therapy, biofeedback, and meditation. Find time to relax each day.     My support system    Clinic Contact:  Phone number:    Contact 1:  Phone number:    Contact 2:  Phone number:    Protestant/:  Phone number:    Therapist:  Phone number:    Local crisis center:    Phone number:    Other community support:  Phone number:

## 2017-12-28 ASSESSMENT — ANXIETY QUESTIONNAIRES: GAD7 TOTAL SCORE: 2

## 2018-02-12 NOTE — PROGRESS NOTES
"  SUBJECTIVE:  Lev oCpeland, a 47 year old male, is here to discuss the following issues:     HYPERTENSION FOLLOW-UP    Current medication regimen includes toprol 50 QD and lisinopril 30 QD.    His blood pressure had been borderline and at our last visit at the end of December we had considered increasing the toprol dose or changing lisinopril to lisinopril-HCTZ, but he was leaving the country then and we decided not to make any changes at that time.  Patient reports no problems or side effects with the medication.      ANXIETY FOLLOW-UP   He developed CHANDNI and mild MDD last fall.  We tried citalopram but he did not tolerate it at all.  He did establish with a therapist at his workplace.  He is currently not on any SSRI.  He did take lorazepam for prn use during his trip to [place] for the month of January.  He reports he did use some the first couple weeks he was there but then did not need any at all for the last 2 weeks he was there.  He has used this occasionally since he's been back in town.    He describes his mood as good.    PHQ-9 SCORE 12/19/2017 12/19/2017 12/27/2017   Total Score 7 7 5     CHANDNI-7 SCORE 12/19/2017 12/19/2017 12/27/2017   Total Score 5 5 2       Problem list and histories reviewed & updated, as indicated.  Patient Active Problem List   Diagnosis     Isolated or specific phobia     Hypertension goal BP (blood pressure) < 140/90     Anxiety     Major depressive disorder, single episode, mild (H)       BP Readings from Last 3 Encounters:   02/14/18 (!) 143/97   12/27/17 (!) 140/94   12/19/17 135/84    Wt Readings from Last 3 Encounters:   02/14/18 204 lb 8 oz (92.8 kg)   12/27/17 205 lb (93 kg)   12/19/17 203 lb (92.1 kg)           ROS:  PSYCH: Reports that mood is good.  CV: NEGATIVE for dizziness    OBJECTIVE:    BP (!) 143/97 (Cuff Size: Adult Regular)  Pulse 66  Temp 97.3  F (36.3  C) (Oral)  Resp 16  Ht 5' 10\" (1.778 m)  Wt 204 lb 8 oz (92.8 kg)  SpO2 97%  BMI 29.34 kg/m2  GEN:  no " apparent distress  PSYCH:    Appearance: appropriately and casually dressed    Eye Contact: good  Attitude: cooperative    Speech:  stuttering as is his baseline    Thought Form: organized and goal oriented    Thought Content: no evidence of psychotic thought    Mood: good    Affect: mood congruent    Insight: good       ASSESSMENT/PLAN:  1. Hypertension goal BP (blood pressure) < 140/90  Uncontrolled.  We'll change the lisinopril 30 mg to lisinopril-HCTZ 20/25 and monitor.    - BASIC METABOLIC PANEL; Future  - Lipid panel reflex to direct LDL Fasting; Future  - lisinopril-hydrochlorothiazide (PRINZIDE/ZESTORETIC) 20-25 MG per tablet; Take 1 tablet by mouth every morning  Dispense: 90 tablet; Refill: 3    2. Anxiety  He'll continue with prn use of lorazepam.  I last gave him #60 approx 6 weeks ago.  He does not need refill at this time.         Patient Instructions   After you switch lisinopril over to lisinopril-HCTZ schedule a fasting lab-only appointment 1-2 weeks later and we'll check labs.  Also schedule a nurse-only appointment that day to have your blood pressure rechecked.        Joauqina Goldman MD   [facility]

## 2018-02-14 ENCOUNTER — OFFICE VISIT (OUTPATIENT)
Dept: FAMILY MEDICINE | Facility: CLINIC | Age: 48
End: 2018-02-14
Payer: COMMERCIAL

## 2018-02-14 VITALS
BODY MASS INDEX: 29.28 KG/M2 | HEART RATE: 66 BPM | WEIGHT: 204.5 LBS | OXYGEN SATURATION: 97 % | SYSTOLIC BLOOD PRESSURE: 143 MMHG | RESPIRATION RATE: 16 BRPM | HEIGHT: 70 IN | TEMPERATURE: 97.3 F | DIASTOLIC BLOOD PRESSURE: 97 MMHG

## 2018-02-14 DIAGNOSIS — I10 HYPERTENSION GOAL BP (BLOOD PRESSURE) < 140/90: Primary | ICD-10-CM

## 2018-02-14 DIAGNOSIS — F41.9 ANXIETY: ICD-10-CM

## 2018-02-14 PROCEDURE — 99214 OFFICE O/P EST MOD 30 MIN: CPT | Performed by: FAMILY MEDICINE

## 2018-02-14 RX ORDER — LISINOPRIL AND HYDROCHLOROTHIAZIDE 20; 25 MG/1; MG/1
1 TABLET ORAL EVERY MORNING
Qty: 90 TABLET | Refills: 3 | Status: SHIPPED | OUTPATIENT
Start: 2018-02-14 | End: 2018-08-02

## 2018-02-14 NOTE — NURSING NOTE
"Chief Complaint   Patient presents with     Hypertension       Initial BP (!) 143/97 (Cuff Size: Adult Regular)  Pulse 66  Temp 97.3  F (36.3  C) (Oral)  Resp 16  Ht 5' 10\" (1.778 m)  Wt 204 lb 8 oz (92.8 kg)  SpO2 97%  BMI 29.34 kg/m2 Estimated body mass index is 29.34 kg/(m^2) as calculated from the following:    Height as of this encounter: 5' 10\" (1.778 m).    Weight as of this encounter: 204 lb 8 oz (92.8 kg).  Medication Reconciliation: complete     Sherri Duenas, JOHN      "

## 2018-02-14 NOTE — MR AVS SNAPSHOT
After Visit Summary   2/14/2018    Lev Copeland    MRN: [id number]           Patient Information     Date Of Birth          1970        Visit Information        Provider Department      2/14/2018 3:40 PM Joaquina Goldman MD [facility]        Today's Diagnoses     Hypertension goal BP (blood pressure) < 140/90    -  1      Care Instructions    After you switch lisinopril over to lisinopril-HCTZ schedule a fasting lab-only appointment 1-2 weeks later and we'll check labs.  Also schedule a nurse-only appointment that day to have your blood pressure rechecked.            Follow-ups after your visit        Future tests that were ordered for you today     Open Future Orders        Priority Expected Expires Ordered    BASIC METABOLIC PANEL Routine 2/21/2018 2/14/2019 2/14/2018    Lipid panel reflex to direct LDL Fasting Routine 2/21/2018 2/14/2019 2/14/2018            Who to contact     If you have questions or need follow up information about today's clinic visit or your schedule please contact [facility] directly at [phone].  Normal or non-critical lab and imaging results will be communicated to you by [employer]thart, letter or phone within 4 business days after the clinic has received the results. If you do not hear from us within 7 days, please contact the clinic through [employer] or phone. If you have a critical or abnormal lab result, we will notify you by phone as soon as possible.  Submit refill requests through [employer]whiz or call your pharmacy and they will forward the refill request to us. Please allow 3 business days for your refill to be completed.          Additional Information About Your Visit        Localminthart Information     [employer] gives you secure access to your electronic health record. If you see a primary care provider, you can also send messages to your care team and make appointments. If you have questions, please call your primary care clinic.  If you do  "not have a primary care provider, please call [phone] and they will assist you.        Care EveryWhere ID     This is your Care EveryWhere ID. This could be used by other organizations to access your [facility] medical records  [id number]        Your Vitals Were     Pulse Temperature Respirations Height Pulse Oximetry BMI (Body Mass Index)    66 97.3  F (36.3  C) (Oral) 16 5' 10\" (1.778 m) 97% 29.34 kg/m2       Blood Pressure from Last 3 Encounters:   02/14/18 (!) 143/97   12/27/17 (!) 140/94   12/19/17 135/84    Weight from Last 3 Encounters:   02/14/18 204 lb 8 oz (92.8 kg)   12/27/17 205 lb (93 kg)   12/19/17 203 lb (92.1 kg)                 Today's Medication Changes          These changes are accurate as of 2/14/18  4:21 PM.  If you have any questions, ask your nurse or doctor.               Start taking these medicines.        Dose/Directions    lisinopril-hydrochlorothiazide 20-25 MG per tablet   Commonly known as:  PRINZIDE/ZESTORETIC   Used for:  Hypertension goal BP (blood pressure) < 140/90   Started by:  Joaquina Goldman MD        Dose:  1 tablet   Take 1 tablet by mouth every morning   Quantity:  90 tablet   Refills:  3         Stop taking these medicines if you haven't already. Please contact your care team if you have questions.     lisinopril 30 MG tablet   Commonly known as:  PRINIVIL,ZESTRIL   Stopped by:  Joaquina Goldman MD                Where to get your medicines      These medications were sent to [facility] - [place], [place] - [address]  [address], [place] [address]     Phone:  [phone]     lisinopril-hydrochlorothiazide 20-25 MG per tablet                Primary Care Provider Office Phone # Fax #    Joaquina Goldman [phone] [address]  [facility] [address]        Equal Access to Services     [facility] AH: Lico Neff, dewayne asher, qaybta leonard contreras" layefri hall. So [facility] [phone].    ATENCIÓN: Si habla forest, tiene a lin disposición servicios gratuitos de asistencia lingüística. Sharad al [phone].    We comply with applicable federal civil rights laws and [place] laws. We do not discriminate on the basis of race, color, national origin, age, disability, sex, sexual orientation, or gender identity.            Thank you!     Thank you for choosing [facility]  for your care. Our goal is always to provide you with excellent care. Hearing back from our patients is one way we can continue to improve our services. Please take a few minutes to complete the written survey that you may receive in the mail after your visit with us. Thank you!             Your Updated Medication List - Protect others around you: Learn how to safely use, store and throw away your medicines at www.disposemymeds.org.          This list is accurate as of 2/14/18  4:21 PM.  Always use your most recent med list.                   Brand Name Dispense Instructions for use Diagnosis    lisinopril-hydrochlorothiazide 20-25 MG per tablet    PRINZIDE/ZESTORETIC    90 tablet    Take 1 tablet by mouth every morning    Hypertension goal BP (blood pressure) < 140/90       LORazepam 0.5 MG tablet    ATIVAN    60 tablet    Take 1 tablet (0.5 mg) by mouth 2 times daily as needed    Isolated or specific phobia, Anxiety       metoprolol succinate 50 MG 24 hr tablet    TOPROL-XL    90 tablet    Take 1 tablet (50 mg) by mouth daily    Hypertension goal BP (blood pressure) < 140/90, Anxiety

## 2018-02-14 NOTE — PATIENT INSTRUCTIONS
After you switch lisinopril over to lisinopril-HCTZ schedule a fasting lab-only appointment 1-2 weeks later and we'll check labs.  Also schedule a nurse-only appointment that day to have your blood pressure rechecked.

## 2018-03-27 DIAGNOSIS — I10 HYPERTENSION GOAL BP (BLOOD PRESSURE) < 140/90: ICD-10-CM

## 2018-03-27 LAB
ANION GAP SERPL CALCULATED.3IONS-SCNC: 5 MMOL/L (ref 3–14)
BUN SERPL-MCNC: 16 MG/DL (ref 7–30)
CALCIUM SERPL-MCNC: 8.8 MG/DL (ref 8.5–10.1)
CHLORIDE SERPL-SCNC: 104 MMOL/L (ref 94–109)
CHOLEST SERPL-MCNC: 178 MG/DL
CO2 SERPL-SCNC: 31 MMOL/L (ref 20–32)
CREAT SERPL-MCNC: 1 MG/DL (ref 0.66–1.25)
GFR SERPL CREATININE-BSD FRML MDRD: 79 ML/MIN/1.7M2
GLUCOSE SERPL-MCNC: 97 MG/DL (ref 70–99)
HDLC SERPL-MCNC: 43 MG/DL
LDLC SERPL CALC-MCNC: 93 MG/DL
NONHDLC SERPL-MCNC: 135 MG/DL
POTASSIUM SERPL-SCNC: 3.7 MMOL/L (ref 3.4–5.3)
SODIUM SERPL-SCNC: 140 MMOL/L (ref 133–144)
TRIGL SERPL-MCNC: 212 MG/DL

## 2018-03-27 PROCEDURE — 36415 COLL VENOUS BLD VENIPUNCTURE: CPT | Performed by: FAMILY MEDICINE

## 2018-03-27 PROCEDURE — 80061 LIPID PANEL: CPT | Performed by: FAMILY MEDICINE

## 2018-03-27 PROCEDURE — 80048 BASIC METABOLIC PNL TOTAL CA: CPT | Performed by: FAMILY MEDICINE

## 2018-03-27 NOTE — PROGRESS NOTES
Hi Lev,  This looks great!  Your total cholesterol and LDL (bad) cholesterol are both improved compared to last year.  Your triglycerides are up a bit but that's ok; they tend to fluctuate.    Your basic metabolic panel results (blood salts, blood sugar, and kidney function) are all normal.     I'm not seeing that you had your blood pressure rechecked today when you were in clinic.  Are you checking it yourself?  If not, please do schedule a nurse-only appointment for blood pressure recheck.       Joaquina Goldman MD

## 2018-07-09 ENCOUNTER — MYC REFILL (OUTPATIENT)
Dept: FAMILY MEDICINE | Facility: CLINIC | Age: 48
End: 2018-07-09

## 2018-07-09 DIAGNOSIS — F41.9 ANXIETY: ICD-10-CM

## 2018-07-09 DIAGNOSIS — F40.298 ISOLATED OR SPECIFIC PHOBIA: ICD-10-CM

## 2018-07-09 RX ORDER — LORAZEPAM 0.5 MG/1
0.5 TABLET ORAL 2 TIMES DAILY PRN
Qty: 60 TABLET | Refills: 0 | Status: SHIPPED | OUTPATIENT
Start: 2018-07-09 | End: 2018-11-29

## 2018-07-09 NOTE — TELEPHONE ENCOUNTER
Message from [employer]:  Original authorizing provider: Joaquina Goldman MD    Lev Copeland would like a refill of the following medications:  LORazepam (ATIVAN) 0.5 MG tablet [Joaquina Goldman MD]    Preferred pharmacy: [facility] - [place], [place] - [address]    Comment:  Hi Dr Goldman, I was wondering if I could get a refill of the Lorazepam prescription without an office visit -- to save the expense. I continue to use the medicine as we discussed, namely, as needed for anxiety -- which comes and goes, but most days is not an issue. I have some trips at the end of July through early August, and noticed that was getting low on the Lorazepam, and so thought it would be a good time to refill.

## 2018-07-09 NOTE — TELEPHONE ENCOUNTER
Requested Prescriptions   Pending Prescriptions Disp Refills     LORazepam (ATIVAN) 0.5 MG tablet 60 tablet 0     Sig: Take 1 tablet (0.5 mg) by mouth 2 times daily as needed    There is no refill protocol information for this order

## 2018-07-09 NOTE — TELEPHONE ENCOUNTER
Last script in Dec 2017 and was for #60.  I refilled again as he is using as directed (#60 is lasting 6 months).  Script is in [facility].  Please fax to pharmacy.  Joaquina Goldman MD

## 2018-07-10 NOTE — TELEPHONE ENCOUNTER
Fax script to [facility]. My chart him that script is ready at [facility] pharmacy.      Messi Harding MA

## 2018-08-02 DIAGNOSIS — I10 HYPERTENSION GOAL BP (BLOOD PRESSURE) < 140/90: ICD-10-CM

## 2018-08-02 DIAGNOSIS — F41.9 ANXIETY: ICD-10-CM

## 2018-08-02 RX ORDER — METOPROLOL SUCCINATE 50 MG/1
50 TABLET, EXTENDED RELEASE ORAL DAILY
Qty: 30 TABLET | Refills: 0 | Status: SHIPPED | OUTPATIENT
Start: 2018-08-02 | End: 2018-11-29

## 2018-08-02 RX ORDER — LISINOPRIL AND HYDROCHLOROTHIAZIDE 20; 25 MG/1; MG/1
1 TABLET ORAL EVERY MORNING
Qty: 30 TABLET | Refills: 0 | Status: SHIPPED | OUTPATIENT
Start: 2018-08-02 | End: 2018-11-29

## 2018-08-02 NOTE — TELEPHONE ENCOUNTER
Patient calling to find out status of this call.  Please call today.  OK to leave message on voicemail.

## 2018-08-02 NOTE — TELEPHONE ENCOUNTER
Reason for Call:  Medication or medication refill:    Do you use a [facility]?  Name of the pharmacy and phone number for the current request:      Name of the medication requested: lisinopril, metoprolol    Other request: patient is out of town and forgot his medications and is asking for refills to be sent to a pharmacy close to where he is at a [facility] pharmacy in a [place] in [place], [place] phone number to this pharmacy is [phone]    Can we leave a detailed message on this number? YES    Phone number patient can be reached at: Home number on file [phone] (home)    Best Time: anytime    Call taken on 8/2/2018 at 9:46 AM by Ann Lou

## 2018-08-20 ENCOUNTER — MYC MEDICAL ADVICE (OUTPATIENT)
Dept: FAMILY MEDICINE | Facility: CLINIC | Age: 48
End: 2018-08-20

## 2018-08-20 DIAGNOSIS — Z12.11 COLON CANCER SCREENING: Primary | ICD-10-CM

## 2018-08-20 DIAGNOSIS — I51.89 FAMILIAL HEART DISEASE: ICD-10-CM

## 2018-08-20 NOTE — TELEPHONE ENCOUNTER
Thanks.  I changed cardiology referral to [facility] (preventive cardiology at [facility]).  I think that would be a better place to start.    Joaquina Goldman MD

## 2018-08-20 NOTE — TELEPHONE ENCOUNTER
GI referral and cardiology referral pended.    Dr. Goldman-Please review and advise/sign if agree.    Thank you!  TORITO BernardN, RN

## 2018-08-21 ENCOUNTER — MYC MEDICAL ADVICE (OUTPATIENT)
Dept: FAMILY MEDICINE | Facility: CLINIC | Age: 48
End: 2018-08-21

## 2018-08-22 NOTE — TELEPHONE ENCOUNTER
Writer attempted to call patient: Left message to call back and ask to speak with an available triage nurse.      Writer responded as per below.    TORITO BernardN, RN

## 2018-08-22 NOTE — TELEPHONE ENCOUNTER
Talked to pt.  Pt sees the cardiac health clinic in Nov.  No cp.  Has tension in chest. Not constant.  No radiating pain.  No dizziness, nausea, vomiting.    This has been a long standing issue.  May be anxiety related.    Pt made appt with pcp for 8/23/18.  Plan- if sustained sx for 15 min, pt should go to ER.  Pt in agreement of poc.  AUGUSTINE Tamayo

## 2018-08-23 ENCOUNTER — HOSPITAL ENCOUNTER (EMERGENCY)
Facility: CLINIC | Age: 48
Discharge: HOME | End: 2018-08-23
Attending: EMERGENCY MEDICINE | Admitting: EMERGENCY MEDICINE
Payer: COMMERCIAL

## 2018-08-23 ENCOUNTER — TELEPHONE (OUTPATIENT)
Dept: CARDIOLOGY | Facility: CLINIC | Age: 48
End: 2018-08-23

## 2018-08-23 ENCOUNTER — APPOINTMENT (OUTPATIENT)
Dept: GENERAL RADIOLOGY | Facility: CLINIC | Age: 48
End: 2018-08-23
Attending: EMERGENCY MEDICINE
Payer: COMMERCIAL

## 2018-08-23 ENCOUNTER — OFFICE VISIT (OUTPATIENT)
Dept: FAMILY MEDICINE | Facility: CLINIC | Age: 48
End: 2018-08-23
Payer: COMMERCIAL

## 2018-08-23 VITALS
WEIGHT: 205 LBS | SYSTOLIC BLOOD PRESSURE: 129 MMHG | TEMPERATURE: 98.2 F | DIASTOLIC BLOOD PRESSURE: 81 MMHG | RESPIRATION RATE: 20 BRPM | BODY MASS INDEX: 29.41 KG/M2 | OXYGEN SATURATION: 95 % | HEART RATE: 72 BPM

## 2018-08-23 VITALS
RESPIRATION RATE: 19 BRPM | DIASTOLIC BLOOD PRESSURE: 77 MMHG | HEART RATE: 77 BPM | SYSTOLIC BLOOD PRESSURE: 113 MMHG | OXYGEN SATURATION: 98 % | BODY MASS INDEX: 29.39 KG/M2 | TEMPERATURE: 98 F | WEIGHT: 204.81 LBS

## 2018-08-23 DIAGNOSIS — R07.9 CHEST PAIN, UNSPECIFIED TYPE: ICD-10-CM

## 2018-08-23 DIAGNOSIS — R07.9 CHEST PAIN, UNSPECIFIED TYPE: Primary | ICD-10-CM

## 2018-08-23 LAB
ANION GAP SERPL CALCULATED.3IONS-SCNC: 7 MMOL/L (ref 3–14)
BASOPHILS # BLD AUTO: 0 10E9/L (ref 0–0.2)
BASOPHILS NFR BLD AUTO: 0.2 %
BUN SERPL-MCNC: 14 MG/DL (ref 7–30)
CALCIUM SERPL-MCNC: 8.9 MG/DL (ref 8.5–10.1)
CHLORIDE SERPL-SCNC: 104 MMOL/L (ref 94–109)
CO2 SERPL-SCNC: 26 MMOL/L (ref 20–32)
CREAT SERPL-MCNC: 0.95 MG/DL (ref 0.66–1.25)
DIFFERENTIAL METHOD BLD: NORMAL
EOSINOPHIL # BLD AUTO: 0.1 10E9/L (ref 0–0.7)
EOSINOPHIL NFR BLD AUTO: 1.4 %
ERYTHROCYTE [DISTWIDTH] IN BLOOD BY AUTOMATED COUNT: 12.1 % (ref 10–15)
GFR SERPL CREATININE-BSD FRML MDRD: 84 ML/MIN/1.7M2
GLUCOSE SERPL-MCNC: 96 MG/DL (ref 70–99)
HCT VFR BLD AUTO: 41.7 % (ref 40–53)
HGB BLD-MCNC: 14.6 G/DL (ref 13.3–17.7)
IMM GRANULOCYTES # BLD: 0 10E9/L (ref 0–0.4)
IMM GRANULOCYTES NFR BLD: 0.2 %
INTERPRETATION ECG - MUSE: NORMAL
LYMPHOCYTES # BLD AUTO: 1.7 10E9/L (ref 0.8–5.3)
LYMPHOCYTES NFR BLD AUTO: 30 %
MCH RBC QN AUTO: 31.9 PG (ref 26.5–33)
MCHC RBC AUTO-ENTMCNC: 35 G/DL (ref 31.5–36.5)
MCV RBC AUTO: 91 FL (ref 78–100)
MONOCYTES # BLD AUTO: 0.4 10E9/L (ref 0–1.3)
MONOCYTES NFR BLD AUTO: 7.8 %
NEUTROPHILS # BLD AUTO: 3.4 10E9/L (ref 1.6–8.3)
NEUTROPHILS NFR BLD AUTO: 60.4 %
NRBC # BLD AUTO: 0 10*3/UL
NRBC BLD AUTO-RTO: 0 /100
PLATELET # BLD AUTO: 226 10E9/L (ref 150–450)
PLATELET # BLD EST: NORMAL 10*3/UL
POTASSIUM SERPL-SCNC: 3.4 MMOL/L (ref 3.4–5.3)
RBC # BLD AUTO: 4.57 10E12/L (ref 4.4–5.9)
SODIUM SERPL-SCNC: 136 MMOL/L (ref 133–144)
TROPONIN I SERPL-MCNC: <0.015 UG/L (ref 0–0.04)
WBC # BLD AUTO: 5.7 10E9/L (ref 4–11)

## 2018-08-23 PROCEDURE — 85025 COMPLETE CBC W/AUTO DIFF WBC: CPT | Performed by: EMERGENCY MEDICINE

## 2018-08-23 PROCEDURE — 80048 BASIC METABOLIC PNL TOTAL CA: CPT | Performed by: EMERGENCY MEDICINE

## 2018-08-23 PROCEDURE — 99207 ZZC FOR CODING REVIEW: CPT | Performed by: FAMILY MEDICINE

## 2018-08-23 PROCEDURE — 99284 EMERGENCY DEPT VISIT MOD MDM: CPT | Mod: GC | Performed by: EMERGENCY MEDICINE

## 2018-08-23 PROCEDURE — 99284 EMERGENCY DEPT VISIT MOD MDM: CPT | Performed by: EMERGENCY MEDICINE

## 2018-08-23 PROCEDURE — 93005 ELECTROCARDIOGRAM TRACING: CPT | Performed by: EMERGENCY MEDICINE

## 2018-08-23 PROCEDURE — 93000 ELECTROCARDIOGRAM COMPLETE: CPT | Performed by: FAMILY MEDICINE

## 2018-08-23 PROCEDURE — 99215 OFFICE O/P EST HI 40 MIN: CPT | Performed by: FAMILY MEDICINE

## 2018-08-23 PROCEDURE — 71046 X-RAY EXAM CHEST 2 VIEWS: CPT

## 2018-08-23 PROCEDURE — 84484 ASSAY OF TROPONIN QUANT: CPT | Performed by: EMERGENCY MEDICINE

## 2018-08-23 ASSESSMENT — ANXIETY QUESTIONNAIRES
7. FEELING AFRAID AS IF SOMETHING AWFUL MIGHT HAPPEN: MORE THAN HALF THE DAYS
2. NOT BEING ABLE TO STOP OR CONTROL WORRYING: NOT AT ALL
6. BECOMING EASILY ANNOYED OR IRRITABLE: NOT AT ALL
3. WORRYING TOO MUCH ABOUT DIFFERENT THINGS: NOT AT ALL
GAD7 TOTAL SCORE: 5
1. FEELING NERVOUS, ANXIOUS, OR ON EDGE: MORE THAN HALF THE DAYS
5. BEING SO RESTLESS THAT IT IS HARD TO SIT STILL: NOT AT ALL
IF YOU CHECKED OFF ANY PROBLEMS ON THIS QUESTIONNAIRE, HOW DIFFICULT HAVE THESE PROBLEMS MADE IT FOR YOU TO DO YOUR WORK, TAKE CARE OF THINGS AT HOME, OR GET ALONG WITH OTHER PEOPLE: SOMEWHAT DIFFICULT

## 2018-08-23 ASSESSMENT — PATIENT HEALTH QUESTIONNAIRE - PHQ9: 5. POOR APPETITE OR OVEREATING: SEVERAL DAYS

## 2018-08-23 NOTE — ED NOTES
Bed: IN02  Expected date:   Expected time:   Means of arrival:   Comments:  MR [id number] claritza [email or web address] from [facility]. 9 days chest pressure/pain.  given. ECG unchagned. Did not want nitroglycerin or ambulance but driving straight here. Recent flight from [place]. Fam hx cardiac dz

## 2018-08-23 NOTE — TELEPHONE ENCOUNTER
[facility] Call Center    Phone Message    May a detailed message be left on voicemail: yes    Reason for Call: Other: Patient was in the ER today and was told he needed to have an orders for Stress Test and stress test performed within 24-48 hours. Please follow-up as soon as possible with patient for status update.      Action Taken: Message routed to:  [facility] heart

## 2018-08-23 NOTE — DISCHARGE INSTRUCTIONS
Please make an appointment to follow up with Cardiology Clinic (phone: [phone]) as soon as possible even if entirely better.  Please continue to take aspirin daily  *CHEST PAIN, UNCERTAIN CAUSE    Based on your exam today, the exact cause of your chest pain is not certain. Your condition does not seem serious at this time, and your pain does not appear to be coming from your heart. However, sometimes the signs of a serious problem take more time to appear. Therefore, watch for the warning signs listed below.  HOME CARE:    1. Rest today and avoid strenuous activity.  2. Take any prescribed medicine as directed.  FOLLOW UP with your doctor in 1-3 days.   GET PROMPT MEDICAL ATTENTION if any of the following occur:    A change in the type of pain: if it feels different, becomes more severe, lasts longer, or begins to spread into your shoulder, arm, neck, jaw or back    Shortness of breath or increased pain with breathing    Weakness, dizziness, or fainting    Cough with blood or dark colored sputum (phlegm)    Fever over 101  F (38.3  C)    Swelling, pain or redness in one leg    2036-2723 [employer]. [address] [place], [place] [id number]. All rights reserved. This information is not intended as a substitute for professional medical care. Always follow your healthcare professional's instructions.  This information has been modified by your health care provider with permission from the publisher.

## 2018-08-23 NOTE — ED AVS SNAPSHOT
[facility], Emergency Department    [address] [facility] [address]    Phone:  [phone]                                       Lev Copeland   MRN: [id number]    Department:  [facility], Emergency Department   Date of Visit:  8/23/2018           Patient Information     Date Of Birth          1970        Your diagnoses for this visit were:     Chest pain, unspecified type        You were seen by Bryce Hummel MD.        Discharge Instructions        Please make an appointment to follow up with Cardiology Clinic (phone: [phone]) as soon as possible even if entirely better.  Please continue to take aspirin daily  *CHEST PAIN, UNCERTAIN CAUSE    Based on your exam today, the exact cause of your chest pain is not certain. Your condition does not seem serious at this time, and your pain does not appear to be coming from your heart. However, sometimes the signs of a serious problem take more time to appear. Therefore, watch for the warning signs listed below.  HOME CARE:    1. Rest today and avoid strenuous activity.  2. Take any prescribed medicine as directed.  FOLLOW UP with your doctor in 1-3 days.   GET PROMPT MEDICAL ATTENTION if any of the following occur:    A change in the type of pain: if it feels different, becomes more severe, lasts longer, or begins to spread into your shoulder, arm, neck, jaw or back    Shortness of breath or increased pain with breathing    Weakness, dizziness, or fainting    Cough with blood or dark colored sputum (phlegm)    Fever over 101  F (38.3  C)    Swelling, pain or redness in one leg    3588-1326 [employer]. [address] [place] [address]. All rights reserved. This information is not intended as a substitute for professional medical care. Always follow your healthcare professional's instructions.  This information has been modified by your health care provider with permission from the publisher.      Your next 10  appointments already scheduled     Nov 08, 2018  8:45 AM CST   LAB with  LAB   [facility] ([facility])    [address]  1st Floor  [facility] [address] [phone]           Please do not eat 10-12 hours before your appointment if you are coming in fasting for labs on lipids, cholesterol, or glucose (sugar). This does not apply to pregnant women. Water, hot tea and black coffee (with nothing added) are okay. Do not drink other fluids, diet soda or chew gum.            Nov 08, 2018  9:00 AM CST   (Arrive by 8:45 AM)   [facility] PREVENTATIVE VISIT with STEVE Gonzáles [facility] ([facility])    [address]  5th Floor  [facility] [address] [phone]           Please arrive 15 minutes prior to your appointment to obtain your laboratory samples. The abdominal ultrasound and laboratory tests require that you fast (no food, water only) for 12 hours and no alcohol for 24 hours prior to having your blood drawn.  You may take your medications with water. You are welcome to bring a snack to have after your laboratory sample has been obtained.  Please wear comfortable clothing and shoes for walking. We will check your blood pressure during a brief activity test using a treadmill. This is not a stress test.   You may find it more convenient not to wear panty hose since we will need to apply EKG stickers to the lower legs.  A urine sample will be needed when you arrive at the clinic.  Your appointment will include a photo of the back of your eye. Although we do not need to dilate your eyes, you may need to remove contact lenses for this test. Please bring any equipment you need to remove your contacts or wear glasses.  Except for having blood drawn, all of the diagnostic tests are conducted on the surface of the body and involve little or no discomfort or risk to your health.  We look  forward to seeing you at your upcoming appointment. If you have additional questions please contact Miri Boykin at [phone]              24 Hour Appointment Hotline       To make an appointment at any [facility] clinic, call [phone] ([phone]). If you don't have a family doctor or clinic, we will help you find one. [facility] clinics are conveniently located to serve the needs of you and your family.          ED Discharge Orders     Follow up with Cardiology       You should receive a call from [facility], [facility] Care scheduling to schedule your follow up appointment.  If you do not hear from them within 3 business days call [phone] for help in scheduling your testing and follow up.                     Review of your medicines      Our records show that you are taking the medicines listed below. If these are incorrect, please call your family doctor or clinic.        Dose / Directions Last dose taken    ASPIRIN PO   Dose:  324 mg        Take 324 mg by mouth daily   Refills:  0        lisinopril-hydrochlorothiazide 20-25 MG per tablet   Commonly known as:  PRINZIDE/ZESTORETIC   Dose:  1 tablet   Quantity:  30 tablet        Take 1 tablet by mouth every morning   Refills:  0        LORazepam 0.5 MG tablet   Commonly known as:  ATIVAN   Dose:  0.5 mg   Quantity:  60 tablet        Take 1 tablet (0.5 mg) by mouth 2 times daily as needed   Refills:  0        metoprolol succinate 50 MG 24 hr tablet   Commonly known as:  TOPROL-XL   Dose:  50 mg   Quantity:  30 tablet        Take 1 tablet (50 mg) by mouth daily   Refills:  0                Procedures and tests performed during your visit     Basic metabolic panel    CBC with platelets differential    EKG 12 lead    Peripheral IV: Standard    Troponin I    XR Chest 2 Views      Orders Needing Specimen Collection     None      Pending Results     Date and Time Order Name Status Description    8/23/2018 1252 CBC with platelets  differential In process     8/23/2018 1252 XR Chest 2 Views Preliminary             Pending Culture Results     No orders found from 8/21/2018 to 8/24/2018.            Pending Results Instructions     If you had any lab results that were not finalized at the time of your Discharge, you can call the ED Lab Result RN at [phone]. You will be contacted by this team for any positive Lab results or changes in treatment. The nurses are available 7 days a week from 10A to 6:30P.  You can leave a message 24 hours per day and they will return your call.        Thank you for choosing [facility]       Thank you for choosing [facility] for your care. Our goal is always to provide you with excellent care. Hearing back from our patients is one way we can continue to improve our services. Please take a few minutes to complete the written survey that you may receive in the mail after you visit with us. Thank you!        [employer] Information     [employer] gives you secure access to your electronic health record. If you see a primary care provider, you can also send messages to your care team and make appointments. If you have questions, please call your primary care clinic.  If you do not have a primary care provider, please call [phone] and they will assist you.        Care EveryWhere ID     This is your Care EveryWhere ID. This could be used by other organizations to access your [facility] medical records  [id number]        Equal Access to Services     ISAIAS SEXTON : Lico Neff, waaxda luqadaha, qaybta kaalsony mora, leonard hall. So [facility] [phone].    ATENCIÓN: Si habla español, tiene a lin disposición servicios gratuitos de asistencia lingüística. Llame al [phone].    We comply with applicable federal civil rights laws and [place] laws. We do not discriminate on the basis of race, color, national origin, age, disability, sex, sexual orientation, or gender  identity.            After Visit Summary       This is your record. Keep this with you and show to your community pharmacist(s) and doctor(s) at your next visit.

## 2018-08-23 NOTE — ED PROVIDER NOTES
"  History     Chief Complaint   Patient presents with     Chest Pain     HPI  Lev Copeland is a 47 year old male with family history of early MI, PMH of anxiety, HTN and MDD, who is referred by his primary for evaluation of chest pain.     Patient reports onset while at rest 9 days ago, about 2 hours after he ran with his dog. He describes the discomfort as a tightness or \"like a big book is on my chest\". It is localized to his anterior upper chest, with no radiations. He thinks it might have been improving since onset, it is constant but ranges in intensity from very mild to moderate. Does not keep him from sleeping. No arm numbness or tingling. No changes with eating, exercising, positions, upper extremity movement, or deep inspiration. Denies diaphoresis, SOB or palpitations, no leg edema. He notes that his anxiety attacks present with this kind of chest discomfort, they usually last 3-4 days though.  He does endorse feeling more fatigued since starting metoprolol in the fall, and some decreased appetite since the discomfort started, but is eating regularly and no weight loss. No recent sore throat, congestion, cough, abdominal pain, n/v/d. No sick contacts  He returned from [place] in late July, 2 weeks before onset of symptoms. Denies leg pain or swelling.  He took 2 ativan and 4 baby aspirins this AM with improvement of pain, but he saw his PCP for a physical and they referred him here with concern for cardiac etiology.    I have reviewed the Medications, Allergies, Past Medical and Surgical History, and Social History in the Epic system.    Review of Systems   ROS: 10 point ROS neg other than the symptoms noted above in the HPI.    Physical Exam     Patient Vitals for the past 24 hrs:   BP Temp Temp src Pulse Heart Rate Resp SpO2 Weight   08/23/18 1400 - - - - 68 14 96 % -   08/23/18 1357 - - - - 63 12 97 % -   08/23/18 1356 120/83 - - - - - 96 % -   08/23/18 1315 116/77 - - - 67 21 96 % -   08/23/18 1300 - " - - - 67 12 95 % -   08/23/18 1246 (!) 123/93 98  F (36.7  C) Oral 77 - 16 95 % 92.9 kg (204 lb 12.9 oz)       Physical Exam  General:   Well-nourished   Speaking in full sentences  Eyes:   Conjunctiva without injection or scleral icterus  Resp:   Lungs CTAB   No crackles, wheezing or audible rubs   Good air movement  CV:    Normal rate, regular rhythm   S1 and S2 present   No murmur, gallop or rub  GI:   BS present   Abdomen soft without distention   Non-tender to light and deep palpation   No guarding or rebound tenderness  Skin:   Warm, dry, well perfused   No rashes or open wounds on exposed skin  MSK:   Moves all extremities   No focal deformities or swelling  Neuro:   Alert   Answers questions appropriately   Moves all extremities equally   Gait stable  Psych:   Normal affect, normal mood      ED Course     ED Interventions:  Medications - No data to display     ED Course:  I reviewed the patient's medical record.   The patient was seen and examined by myself. I discussed the course of care with the patient including laboratory and diagnostic studies.    He understands and is agreeable to the plan.  Recheck 1330: chest discomfort persists, unchanged physical exam.      I discussed with the patient and wife the results of the above studies and procedures.   He will be discharged to home with cardiology followup     Procedures             EKG Interpretation:      Interpreted by Dena Florence  Time reviewed: 1255  Symptoms at time of EKG: chest pressure   Rhythm: normal sinus   Rate: normal  Axis: normal  Ectopy: none  Conduction: normal  ST Segments/ T Waves: No ST-T wave changes  Q Waves: none  Comparison to prior: Unchanged from 02/14/2016    Clinical Impression: normal EKG    Labs Ordered and Resulted from Time of ED Arrival Up to the Time of Departure from the ED   CBC WITH PLATELETS DIFFERENTIAL   BASIC METABOLIC PANEL   TROPONIN I   PERIPHERAL IV CATHETER     Recent Results (from the past 168 hour(s))    Basic metabolic panel   Result Value Ref Range Status    Sodium 136 133 - 144 mmol/L Final    Potassium 3.4 3.4 - 5.3 mmol/L Final    Chloride 104 94 - 109 mmol/L Final    Carbon Dioxide 26 20 - 32 mmol/L Final    Anion Gap 7 3 - 14 mmol/L Final    Glucose 96 70 - 99 mg/dL Final    Urea Nitrogen 14 7 - 30 mg/dL Final    Creatinine 0.95 0.66 - 1.25 mg/dL Final    GFR Estimate 84 >60 mL/min/1.7m2 Final    GFR Estimate If Black >90 >60 mL/min/1.7m2 Final    Calcium 8.9 8.5 - 10.1 mg/dL Final   Troponin I   Result Value Ref Range Status    Troponin I ES <0.015 0.000 - 0.045 ug/L Final       Recent Labs   Lab Test  08/23/18   1314   WBC  5.7   RBC  4.57   HGB  14.6   HCT  41.7   MCV  91   MCH  31.9   MCHC  35.0   RDW  12.1   PLT  226     XR Chest 2 Views   Preliminary Result   IMPRESSION: Clear lungs.               Assessments & Plan (with Medical Decision Making)     HEART Score  Criteria   0-2 points for each of 5 items (maximum of 10 points):  Score 0- History slightly suspicious for coronary syndrome  Score 0- EKG Normal  Score 1- Age 45 to 65 years old  Score 1- One to 2 risk factors for atherosclerotic disease  Score 0- Within normal limits for troponin levels  Interpretation  Risk of adverse outcome  Heart Score: 2  Total Score 0-3- Adverse Outcome Risk 2.5% - Supports early discharge with appropriate follow-up    Lev Copeland is a 47 year old male presented with chest pain. Initial laboratory and imaging tests have come back normal. Troponin is negative, did not repeat as he had symptoms for 9 days PTA.   There is no clinical, laboratory, or radiographic evidence of pulmonary embolism, aortic dissection or cardiac ischemia. Did not pursue PE testing as he was Wells and PERC negative. Given the low HEART score, I feel the candidate is appropriate for discharge with outpatient stress testing. He has agreed to follow-up with the stress test and primary doctor within 48 hours and return if any worsening.  All  questions were answered prior to discharge, and the patient was told to follow up per discharge instructions.    Reasons for return as well as follow up were reviewed with the patient. He understands and agrees to this plan.  I have reviewed the nursing notes.    New Prescriptions    No medications on file       Final diagnoses:   Chest pain, unspecified type     Dena Florence MD  Family Medicine Resident [facility]    8/23/2018   [facility], [place], EMERGENCY DEPARTMENT           Dena Florence MD  Resident  08/23/18 7691    This data collected with the Resident working in the Emergency Department.  Patient was seen and evaluated by myself and I repeated the history and physical exam with the patient.  The plan of care was discussed with them.  The key portions of the note including the entire assessment and plan reflect my documentation.     Briefly, patient with 9 days of chest pain, low suspicion for cardiac etiology.low risk Wells, negative PERC, unlikely PE.  EKG is unchanged.  The patient is asymptomatic here.  Troponin is negative and chest x-ray is normal.  Will place cardiology referral.  Patient agrees with this plan, he will return with worsening symptoms.  Recommended continuing aspirin.         Bryce Hummel MD  08/23/18 6868

## 2018-08-23 NOTE — ED AVS SNAPSHOT
[facility], [place], Emergency Department    [address] [place] [address]    Phone:  [phone]                                       Lev Copeland   MRN: [id number]    Department:  [facility], Emergency Department   Date of Visit:  8/23/2018           After Visit Summary Signature Page     I have received my discharge instructions, and my questions have been answered. I have discussed any challenges I see with this plan with the nurse or doctor.    ..........................................................................................................................................  Patient/Patient Representative Signature      ..........................................................................................................................................  Patient Representative Print Name and Relationship to Patient    ..................................................               ................................................  Date                                            Time    ..........................................................................................................................................  Reviewed by Signature/Title    ...................................................              ..............................................  Date                                                            Time          22EPIC Rev 08/18

## 2018-08-23 NOTE — MR AVS SNAPSHOT
After Visit Summary   8/23/2018    Lev Copeland    MRN: [id number]           Patient Information     Date Of Birth          1970        Visit Information        Provider Department      8/23/2018 11:00 AM Joaquina Goldman MD [facility]        Today's Diagnoses     Chest pain, unspecified type    -  1       Follow-ups after your visit        Your next 10 appointments already scheduled     Nov 08, 2018  8:45 AM CST   LAB with  LAB   [facility] ([facility])    [address]  [address] [facility] [id number] [phone]           Please do not eat 10-12 hours before your appointment if you are coming in fasting for labs on lipids, cholesterol, or glucose (sugar). This does not apply to pregnant women. Water, hot tea and black coffee (with nothing added) are okay. Do not drink other fluids, diet soda or chew gum.            Nov 08, 2018  9:00 AM CST   (Arrive by 8:45 AM)   [facility]    [address] [place] [address]  5th Floor  [facility] [id number] [phone]           Please arrive 15 minutes prior to your appointment to obtain your laboratory samples. The abdominal ultrasound and laboratory tests require that you fast (no food, water only) for 12 hours and no alcohol for 24 hours prior to having your blood drawn.  You may take your medications with water. You are welcome to bring a snack to have after your laboratory sample has been obtained.  Please wear comfortable clothing and shoes for walking. We will check your blood pressure during a brief activity test using a treadmill. This is not a stress test.   You may find it more convenient not to wear panty hose since we will need to apply EKG stickers to the lower legs.  A urine sample will be needed when you arrive at the clinic.  Your  appointment will include a photo of the back of your eye. Although we do not need to dilate your eyes, you may need to remove contact lenses for this test. Please bring any equipment you need to remove your contacts or wear glasses.  Except for having blood drawn, all of the diagnostic tests are conducted on the surface of the body and involve little or no discomfort or risk to your health.  We look forward to seeing you at your upcoming appointment. If you have additional questions please contact Miri Boykin at [phone]              Who to contact     If you have questions or need follow up information about today's clinic visit or your schedule please contact [facility] directly at [phone].  Normal or non-critical lab and imaging results will be communicated to you by MyChart, letter or phone within 4 business days after the clinic has received the results. If you do not hear from us within 7 days, please contact the clinic through [employer] or phone. If you have a critical or abnormal lab result, we will notify you by phone as soon as possible.  Submit refill requests through [employer] or call your pharmacy and they will forward the refill request to us. Please allow 3 business days for your refill to be completed.          Additional Information About Your Visit        [employer] Information     [employer] gives you secure access to your electronic health record. If you see a primary care provider, you can also send messages to your care team and make appointments. If you have questions, please call your primary care clinic.  If you do not have a primary care provider, please call [phone] and they will assist you.        Care EveryWhere ID     This is your Care EveryWhere ID. This could be used by other organizations to access your [facility] medical records  [id number]        Your Vitals Were     Pulse Temperature Respirations Pulse Oximetry BMI (Body Mass Index)       72 98.2  F (36.8  C)  (Tympanic) 20 95% 29.41 kg/m2        Blood Pressure from Last 3 Encounters:   08/23/18 (!) 123/93   08/23/18 129/81   02/14/18 (!) 143/97    Weight from Last 3 Encounters:   08/23/18 204 lb 12.9 oz (92.9 kg)   08/23/18 205 lb (93 kg)   02/14/18 204 lb 8 oz (92.8 kg)              We Performed the Following     EKG 12-lead complete w/read - Clinics        Primary Care Provider Office Phone # Fax #    Joaquina Goldman [phone]       [address]  [facility] [address]        Equal Access to Services     ISAIAS SEXTON : Hadii sharon Neff, wanenita asher, qaybta kaalmada morgan, leonard hsu . So [facility] [phone].    ATENCIÓN: Si habla español, tiene a lin disposición servicios gratuitos de asistencia lingüística. Llame al [phone].    We comply with applicable federal civil rights laws and [place] laws. We do not discriminate on the basis of race, color, national origin, age, disability, sex, sexual orientation, or gender identity.            Thank you!     Thank you for choosing [facility]  for your care. Our goal is always to provide you with excellent care. Hearing back from our patients is one way we can continue to improve our services. Please take a few minutes to complete the written survey that you may receive in the mail after your visit with us. Thank you!             Your Updated Medication List - Protect others around you: Learn how to safely use, store and throw away your medicines at www.disposemymeds.org.          This list is accurate as of 8/23/18  1:13 PM.  Always use your most recent med list.                   Brand Name Dispense Instructions for use Diagnosis    ASPIRIN PO      Take 324 mg by mouth daily        lisinopril-hydrochlorothiazide 20-25 MG per tablet    PRINZIDE/ZESTORETIC    30 tablet    Take 1 tablet by mouth every morning    Hypertension goal BP (blood pressure) < 140/90       LORazepam 0.5 MG tablet     ATIVAN    60 tablet    Take 1 tablet (0.5 mg) by mouth 2 times daily as needed    Isolated or specific phobia, Anxiety       metoprolol succinate 50 MG 24 hr tablet    TOPROL-XL    30 tablet    Take 1 tablet (50 mg) by mouth daily    Hypertension goal BP (blood pressure) < 140/90, Anxiety

## 2018-08-23 NOTE — ED TRIAGE NOTES
"Pt presents ambulatory to triage from PCP clinic. Pt states for past 9 days has had increasingly worse chest pressure with occasional dizziness. Pt states has also had fatigue. Pt took home aspirin 324 mg po this am and home atyivan with \"some improvement. Pt has hx family heart issues and personal hx HTN.   "

## 2018-08-23 NOTE — PROGRESS NOTES
"  SUBJECTIVE:   Lev Copeland is a 47 year old male who presents to clinic today for the following health issues:      Depression and Anxiety Follow-Up    Status since last visit: Worsened - notes some physical manifestations of the anxiety    Other associated symptoms: Chest tension, noted after running with the dog, usual techniques to reduce anxiety have not been working    Complicating factors: None    Significant life event: No     Current substance abuse: None    PHQ-9 12/19/2017 12/27/2017 8/23/2018   Total Score 7 5 -   Q9: Suicide Ideation Not at all Not at all Not at all     CHANDNI-7 SCORE 12/19/2017 12/27/2017 8/23/2018   Total Score 5 2 5     Anxiety had been well controlled.  Ran with his dog 9 days ago.  Felt fine during run - but afterwards had some \"tension\" and \"tightness\" in the chest - felt like his anxiety.  Took lorazepam for a couple days and did some meditation which seemed to help some, but chest symptoms persisted - especially in the mornings.  Increased past couple days.  This morning severity was 7/10.  Now down to a 4/10.  Uncomfortable in bed last night - pressure on his chest.  This morning took lorazepam with no immediate effect.  Then took 325 mg ASA which did help.  Slight lightheadedness.  No shortness of breath.  No palpitations.  No nausea but he does note decreased appetite.  No leg swelling.  He did fly back from [place] a few weeks ago.      Cor risk factors: Htn, +FHX - dad had CAB x3 in his 60's.    Patient Active Problem List   Diagnosis     Isolated or specific phobia     Hypertension goal BP (blood pressure) < 140/90     Anxiety     Major depressive disorder, single episode, mild (H)      Problem list and histories reviewed & adjusted, as indicated.  Additional history: as documented    BP Readings from Last 3 Encounters:   08/23/18 129/81   02/14/18 (!) 143/97   12/27/17 (!) 140/94    Wt Readings from Last 3 Encounters:   08/23/18 205 lb (93 kg)   02/14/18 204 lb 8 oz (92.8 " kg)   12/27/17 205 lb (93 kg)            Reviewed and updated as needed this visit by clinical staff  Tobacco  Allergies  Meds  Med Hx  Surg Hx  Fam Hx  Soc Hx          ROS:  Constitutional, HEENT, cardiovascular, pulmonary, gi and gu systems are negative, except as otherwise noted.    OBJECTIVE:     /81 (BP Location: Right arm, Patient Position: Sitting, Cuff Size: Adult Regular)  Pulse 72  Temp 98.2  F (36.8  C) (Tympanic)  Resp 20  Wt 205 lb (93 kg)  SpO2 95%  BMI 29.41 kg/m2  Body mass index is 29.41 kg/(m^2).  GEN:  no apparent distress  EYES: PERRL, conjunctivae and sclerae clear  NECK:  Supple without adenopathy, mass, or thyromegaly  LUNGS:  normal respiratory effort, and lungs clear to auscultation bilaterally - no rales, rhonchi or wheezes  CV: regular rate and rhythm, normal S1 S2, no S3 or S4 and no murmur, click or rub      Diagnostic Test Results:  EKG: appears normal, NSR, normal axis, normal intervals, no acute ST/T changes c/w ischemia, no LVH by voltage criteria, unchanged from previous tracings by my interpretation     ASSESSMENT/PLAN:     1. Chest pain, unspecified type  Unclear etiology/diagnosis with uncertain prognosis requiring further workup - in ED.  Discussed that while EKG is unchanged which is reassuring, he does have risk factors and symptoms concerning for CAD - as well as PE.  I recommended we give him SL NTG and O2 and call for transport to ER.  He declined and wanted to drive himself.  I felt that was safe as long as he drove directly to [facility] ER, and he agreed that if he felt any dizziness or worsening to pull over and call 911.    - EKG 12-lead complete w/read - Clinics      Joaquina Goldman MD  [facility]

## 2018-08-24 ENCOUNTER — HOSPITAL ENCOUNTER (OUTPATIENT)
Dept: CARDIOLOGY | Facility: CLINIC | Age: 48
Discharge: HOME | End: 2018-08-24
Attending: INTERNAL MEDICINE | Admitting: INTERNAL MEDICINE
Payer: COMMERCIAL

## 2018-08-24 DIAGNOSIS — R07.9 CHEST PAIN: ICD-10-CM

## 2018-08-24 DIAGNOSIS — R07.9 CHEST PAIN: Primary | ICD-10-CM

## 2018-08-24 PROCEDURE — 93017 CV STRESS TEST TRACING ONLY: CPT

## 2018-08-24 PROCEDURE — 93017 CV STRESS TEST TRACING ONLY: CPT | Performed by: INTERNAL MEDICINE

## 2018-08-24 PROCEDURE — 94618 PULMONARY STRESS TESTING: CPT | Mod: 26 | Performed by: INTERNAL MEDICINE

## 2018-08-24 ASSESSMENT — ANXIETY QUESTIONNAIRES: GAD7 TOTAL SCORE: 5

## 2018-08-24 NOTE — TELEPHONE ENCOUNTER
[facility] Call Center    Phone Message    May a detailed message be left on voicemail: yes    Reason for Call: Other: Questions as to why his Stress Test and Appt were cancelled for today - needs call back ASAP - Pt intended to come at 1pm for both. Pt was seen at ED for chest pain. Was told to have Stress Test and FU with Cardiologist within 24 hrs. Pt is confused as to why these were cancelled. Plese call him back ASAP.  Requesting to speak with a Dr or Nurse. Thanks.     Action Taken: Message routed to:  [facility] ([facility] Cardiology Clinic

## 2018-08-24 NOTE — TELEPHONE ENCOUNTER
"I spoke with Lev and Radiology scheduling at length.  We scheduled Lev an Exercise Stress Test for today at 1pm at [facility].  We also scheduled Lev a \"New chest pain\" appt with Dr. Peterson on 8/28 at 2:30pm to establish care with Cardiology. Lev verbalizes understanding and agrees with plan of care. Claudia Tariq RN    "

## 2018-08-27 ASSESSMENT — ENCOUNTER SYMPTOMS
NIGHT SWEATS: 0
ALTERED TEMPERATURE REGULATION: 0
SLEEP DISTURBANCES DUE TO BREATHING: 0
WEIGHT GAIN: 0
CHILLS: 0
FEVER: 0
PANIC: 0
DECREASED APPETITE: 0
PALPITATIONS: 0
DECREASED CONCENTRATION: 0
INSOMNIA: 0
LIGHT-HEADEDNESS: 0
EXERCISE INTOLERANCE: 0
DEPRESSION: 0
LEG PAIN: 0
POLYPHAGIA: 0
FATIGUE: 1
HYPOTENSION: 0
HALLUCINATIONS: 0
POLYDIPSIA: 0
SYNCOPE: 0
ORTHOPNEA: 0
HYPERTENSION: 0
WEIGHT LOSS: 0
INCREASED ENERGY: 0
NERVOUS/ANXIOUS: 1

## 2018-08-28 ENCOUNTER — PRE VISIT (OUTPATIENT)
Dept: CARDIOLOGY | Facility: CLINIC | Age: 48
End: 2018-08-28

## 2018-08-28 ENCOUNTER — CARE COORDINATION (OUTPATIENT)
Dept: CARDIOLOGY | Facility: CLINIC | Age: 48
End: 2018-08-28

## 2018-08-28 ENCOUNTER — OFFICE VISIT (OUTPATIENT)
Dept: CARDIOLOGY | Facility: CLINIC | Age: 48
End: 2018-08-28
Attending: INTERNAL MEDICINE
Payer: COMMERCIAL

## 2018-08-28 VITALS
HEART RATE: 70 BPM | WEIGHT: 206.1 LBS | SYSTOLIC BLOOD PRESSURE: 105 MMHG | DIASTOLIC BLOOD PRESSURE: 70 MMHG | BODY MASS INDEX: 29.51 KG/M2 | HEIGHT: 70 IN | OXYGEN SATURATION: 93 %

## 2018-08-28 DIAGNOSIS — I25.10 CORONARY ARTERY DISEASE INVOLVING NATIVE CORONARY ARTERY OF NATIVE HEART, ANGINA PRESENCE UNSPECIFIED: Primary | ICD-10-CM

## 2018-08-28 DIAGNOSIS — R07.9 CHEST PAIN, UNSPECIFIED TYPE: ICD-10-CM

## 2018-08-28 DIAGNOSIS — I10 HYPERTENSION GOAL BP (BLOOD PRESSURE) < 140/90: ICD-10-CM

## 2018-08-28 DIAGNOSIS — R07.89 ATYPICAL CHEST PAIN: Primary | ICD-10-CM

## 2018-08-28 PROCEDURE — 99204 OFFICE O/P NEW MOD 45 MIN: CPT | Mod: ZP | Performed by: INTERNAL MEDICINE

## 2018-08-28 PROCEDURE — G0463 HOSPITAL OUTPT CLINIC VISIT: HCPCS | Mod: ZF

## 2018-08-28 RX ORDER — ATORVASTATIN CALCIUM 40 MG/1
40 TABLET, FILM COATED ORAL DAILY
Qty: 90 TABLET | Refills: 3 | Status: SHIPPED | OUTPATIENT
Start: 2018-08-28 | End: 2019-12-03

## 2018-08-28 ASSESSMENT — PAIN SCALES - GENERAL: PAINLEVEL: NO PAIN (0)

## 2018-08-28 NOTE — PROGRESS NOTES
LM for patient call back to see if he is able to come in tomorrow for a CTA that is recommended by Dr. Cardozo. Will discuss today at clinic visit. Appointment scheduled for 08/29/2018 at 8am.

## 2018-08-28 NOTE — PATIENT INSTRUCTIONS
You were seen at the [facility] clinic today.  You saw Dr. Zackery Cardozo  Here are your Instructions:    1. Coronary CTA tomorrow (8:00 AM at the [facility] waiting room.)  2. Start atorvastatin (Lipitor) 40 mg daily.  3. Follow up with Dr. Cardozo in 6 months with fasting lab work prior.         If you have questions after this visit:  Send a [employer] message or contact Ivett Dominguez LPN  Office:  [phone] option #1, then #3 & ask for Ivett (nurse line)  Fax:  [phone]  After Hours:  [phone] option #4 ask to speak to he on-call Cardiologist  Appointments:  [phone] option #1, then option #1  -----  You are scheduled for a CT Coronary Angiogram at the [facility] on 08/29/2018 at 8:00 AM.   [address] [address] [phone]  Please report to the [facility] Waiting Room in the [facility].    Follow these instructions:  1.The day before the test drink extra water and also on the day of the test.  2. Nothing to eat or drink except water 3 hours prior.  3. No caffeine, smoking, or strenuous activity before test.    4. You will be receiving contrast. You will need pre-treatment if you have allergies to contrast dye or shellfish.   5. You will need to have a current creatinine level within 30 days if you are over the age of 69, diabetic, or have a history of kidney problems.   6. HOLD these medications:   1. oral diabetic medications the morning of and wait 48 hours before  re-starting metformin.     2. Diuretic the day of.   3. NSAIDS (ibuprofen, naproxen) day of.  7. If your heart rate is above 90, please take this beta blocker the day or on day prior: Metoprolol    8. Testing takes about 15 min. Please allow 2 hours for test as you may need medications to slow your heart rate for the test.      If you have further questions, please  utilize [facility] to contact us.   If your question concerns the above instructions, contact:  Ivett Dominguez LPN  Nurse Care Coordinator- [facility]  [phone]

## 2018-08-28 NOTE — PROGRESS NOTES
Chief complaint: ED follow up of chest pain    HPI:   Lev Copeland is a 47 year old male with history of nonobstructive CAD (coronary CTA 2016), longstanding HTN, and family history of CAD referred for evaluation of chest pain.    He states that he went running on 8/14/ (he doesn't usually run), and developed midsternal chest pressure which occurred intermittently for 9 days after. He states that he has had chest pressure with anxiety before, but never with ongoing symptoms for this long. Chest pressure was not clearly related to exertion, and sometimes occurred at rest. Exercised on the treadmill 4-5 times while he was having chest pain without worsening of symptoms. Symptoms were relieved by relaxation exercises and lorazepam. Episodes would last for an entire day. No associated dyspnea. He was seen in the ED on 8/23, where ECG showed no evidence of ischemia and troponin was negative. He was discharged home with plan for outpatient Cardiology follow up and treadmill ECG.     He denies any dyspnea at rest or with exertion, PND, orthopnea, peripheral edema, palpitations, lightheadedness or syncope.       PAST MEDICAL HISTORY:  Past Medical History:   Diagnosis Date     Hypertension goal BP (blood pressure) < 140/90      Isolated or specific phobia     performance     Stuttering        CURRENT MEDICATIONS:  Current Outpatient Prescriptions   Medication Sig Dispense Refill     ASPIRIN PO Take 324 mg by mouth daily       lisinopril-hydrochlorothiazide (PRINZIDE/ZESTORETIC) 20-25 MG per tablet Take 1 tablet by mouth every morning 30 tablet 0     LORazepam (ATIVAN) 0.5 MG tablet Take 1 tablet (0.5 mg) by mouth 2 times daily as needed 60 tablet 0     metoprolol succinate (TOPROL-XL) 50 MG 24 hr tablet Take 1 tablet (50 mg) by mouth daily 30 tablet 0       PAST SURGICAL HISTORY:  Past Surgical History:   Procedure Laterality Date     NO HISTORY OF SURGERY         ALLERGIES:   No Known Allergies    FAMILY  "HISTORY:  Family History   Problem Relation Age of Onset     HEART DISEASE Father      CAB x4 at age 63     Hypertension Sister    No family history of premature CAD or sudden death.    SOCIAL HISTORY:  Social History   Substance Use Topics     Smoking status: Never Smoker     Smokeless tobacco: Never Used     Alcohol use Yes      Comment: 4 Drinks per week       ROS:   A comprehensive 14 point review of systems is negative other than as mentioned in HPI.    Exam:  /70 (BP Location: Left arm, Patient Position: Chair, Cuff Size: Adult Large)  Pulse 70  Ht 1.778 m (5' 10\")  Wt 93.5 kg (206 lb 1.6 oz)  SpO2 93%  BMI 29.57 kg/m2  GENERAL APPEARANCE: healthy, alert and no distress  EYES: no icterus, no xanthelasmas  ENT: normal palate, mucosa moist, no central cyanosis  NECK: no adenopathy, no asymmetry, masses, or scars, thyroid normal to palpation and no bruits, JVP not elevated  RESPIRATORY: lungs clear to auscultation - no rales, rhonchi or wheezes, no use of accessory muscles, no retractions, respirations are unlabored, normal respiratory rate  CARDIOVASCULAR: regular rhythm, normal S1 with physiologic split S2, no S3 or S4 and no murmur, click or rub, precordium quiet with normal PMI.  GI: soft, non tender, without hepatosplenomegaly, no masses palpable, bowel sounds normal, aorta not enlarged by palpation, no abdominal bruits  EXTREMITIES: peripheral pulses normal, no edema, no bruits  NEURO: alert and oriented to person/place/time, normal speech, gait and affect  VASC: Radial, dorsalis pedis and posterior tibialis pulses 2+ bilaterally.  SKIN: no ecchymoses, no rashes.  PSYCH: cooperative, affect appropriate.     Labs:  Reviewed. Of note:  Lipids 3/27/18: Chol 178 HDL 43 LDL 93      Testing/Procedures:  I personally visualized and interpreted:  Coronary CTA 2016: minimal (<25%) stenosis involving the proximal and mid-LAD with mixed plaque.    Exercise ECG 8/23/18 (reviewed in CVIS):  Baseline ECG " sinus, VR 63, no ST/T abnormalities or Q-waves.   Exercised 12:53, 13 METS, max  (96% MPHR.)   No angina elicited. No ECG evidence of ischemia at a diagnostic level of myocardial O2 demand.        Assessment and Plan:   1. CAD (with known non-obstructive coronary atherosclerosis on coronary CTA 2016)  2. Atypical chest pain  Discussed with Lev that despite his normal exercise ECG, given his prior CTA showing CAD (albeit non-obstructive), further evaluation is indicated to exclude progression of his known CAD as the  of his symptoms. Plan for coronary CTA (with FFR-CT if indicated based on angiographic findings.)   He should continue ASA 81 mg daily, and should start atorvastatin 40 mg daily after he returns from [place].    The patient states understanding and is agreeable with plan.     Zackery Cardozo MD  Cardiology    CC  [facility]

## 2018-08-28 NOTE — LETTER
8/28/2018      RE: Lev Copeland  [address] [place] [address]       Dear Colleague,    Thank you for the opportunity to participate in the care of your patient, Lev Copeland, at the [facility]. Please see a copy of my visit note below.    Chief complaint: ED follow up of chest pain    HPI:   Lev Copeland is a 47 year old male with history of nonobstructive CAD (coronary CTA 2016), longstanding HTN, and family history of CAD referred for evaluation of chest pain.    He states that he went running on 8/14/ (he doesn't usually run), and developed midsternal chest pressure which occurred intermittently for 9 days after. He states that he has had chest pressure with anxiety before, but never with ongoing symptoms for this long. Chest pressure was not clearly related to exertion, and sometimes occurred at rest. Exercised on the treadmill 4-5 times while he was having chest pain without worsening of symptoms. Symptoms were relieved by relaxation exercises and lorazepam. Episodes would last for an entire day. No associated dyspnea. He was seen in the ED on 8/23, where ECG showed no evidence of ischemia and troponin was negative. He was discharged home with plan for outpatient Cardiology follow up and treadmill ECG.     He denies any dyspnea at rest or with exertion, PND, orthopnea, peripheral edema, palpitations, lightheadedness or syncope.       PAST MEDICAL HISTORY:  Past Medical History:   Diagnosis Date     Hypertension goal BP (blood pressure) < 140/90      Isolated or specific phobia     performance     Stuttering        CURRENT MEDICATIONS:  Current Outpatient Prescriptions   Medication Sig Dispense Refill     ASPIRIN PO Take 324 mg by mouth daily       lisinopril-hydrochlorothiazide (PRINZIDE/ZESTORETIC) 20-25 MG per tablet Take 1 tablet by mouth every morning 30 tablet 0     LORazepam (ATIVAN) 0.5 MG tablet Take 1 tablet (0.5 mg) by  "mouth 2 times daily as needed 60 tablet 0     metoprolol succinate (TOPROL-XL) 50 MG 24 hr tablet Take 1 tablet (50 mg) by mouth daily 30 tablet 0       PAST SURGICAL HISTORY:  Past Surgical History:   Procedure Laterality Date     NO HISTORY OF SURGERY         ALLERGIES:   No Known Allergies    FAMILY HISTORY:  Family History   Problem Relation Age of Onset     HEART DISEASE Father      TETE x4 at age 63     Hypertension Sister    No family history of premature CAD or sudden death.    SOCIAL HISTORY:  Social History   Substance Use Topics     Smoking status: Never Smoker     Smokeless tobacco: Never Used     Alcohol use Yes      Comment: 4 Drinks per week       ROS:   A comprehensive 14 point review of systems is negative other than as mentioned in HPI.    Exam:  /70 (BP Location: Left arm, Patient Position: Chair, Cuff Size: Adult Large)  Pulse 70  Ht 1.778 m (5' 10\")  Wt 93.5 kg (206 lb 1.6 oz)  SpO2 93%  BMI 29.57 kg/m2  GENERAL APPEARANCE: healthy, alert and no distress  EYES: no icterus, no xanthelasmas  ENT: normal palate, mucosa moist, no central cyanosis  NECK: no adenopathy, no asymmetry, masses, or scars, thyroid normal to palpation and no bruits, JVP not elevated  RESPIRATORY: lungs clear to auscultation - no rales, rhonchi or wheezes, no use of accessory muscles, no retractions, respirations are unlabored, normal respiratory rate  CARDIOVASCULAR: regular rhythm, normal S1 with physiologic split S2, no S3 or S4 and no murmur, click or rub, precordium quiet with normal PMI.  GI: soft, non tender, without hepatosplenomegaly, no masses palpable, bowel sounds normal, aorta not enlarged by palpation, no abdominal bruits  EXTREMITIES: peripheral pulses normal, no edema, no bruits  NEURO: alert and oriented to person/place/time, normal speech, gait and affect  VASC: Radial, dorsalis pedis and posterior tibialis pulses 2+ bilaterally.  SKIN: no ecchymoses, no rashes.  PSYCH: cooperative, affect " appropriate.     Labs:  Reviewed. Of note:  Lipids 3/27/18: Chol 178 HDL 43 LDL 93      Testing/Procedures:  I personally visualized and interpreted:  Coronary CTA 2016: minimal (<25%) stenosis involving the proximal and mid-LAD with mixed plaque.    Exercise ECG 8/23/18 (reviewed in CVIS):  Baseline ECG sinus, VR 63, no ST/T abnormalities or Q-waves.   Exercised 12:53, 13 METS, max  (96% MPHR.)   No angina elicited. No ECG evidence of ischemia at a diagnostic level of myocardial O2 demand.        Assessment and Plan:   1. CAD (with known non-obstructive coronary atherosclerosis on coronary CTA 2016)  2. Atypical chest pain  Discussed with Lev that despite his normal exercise ECG, given his prior CTA showing CAD (albeit non-obstructive), further evaluation is indicated to exclude progression of his known CAD as the  of his symptoms. Plan for coronary CTA (with FFR-CT if indicated based on angiographic findings.)   He should continue ASA 81 mg daily, and should start atorvastatin 40 mg daily after he returns from [place].    The patient states understanding and is agreeable with plan.     Zackery Cardozo MD  Cardiology    CC  [facility]

## 2018-08-28 NOTE — MR AVS SNAPSHOT
After Visit Summary   8/28/2018    Lev Copeland    MRN: [id number]           Patient Information     Date Of Birth          1970        Visit Information        Provider Department      8/28/2018 2:30 PM Zackery Cardozo MD [facility] CARDIOVASCULAR      Today's Diagnoses     Coronary artery disease involving native coronary artery of native heart, angina presence unspecified    -  1    Chest pain, unspecified type        Hypertension goal BP (blood pressure) < 140/90          Care Instructions    You were seen at the [facility] clinic today.  You saw Dr. Zackery Cardozo  Here are your Instructions:    1. Coronary CTA tomorrow (8:00 AM at the [facility] waiting room.)  2. Start atorvastatin (Lipitor) 40 mg daily.  3. Follow up with Dr. Cardozo in 6 months with fasting lab work prior.         If you have questions after this visit:  Send a [employer]Â® message or contact Ivett Dominguez LPN  Office:  [phone] option #1, then #3 & ask for Ivett (nurse line)  Fax:  [phone]  After Hours:  [phone] option #4 ask to speak to he on-call Cardiologist  Appointments:  [phone] option #1, then option #1  -----  You are scheduled for a CT Coronary Angiogram at the [facility] on 08/29/2018 at 8:00 AM.   [address] [id number] [phone]  Please report to the [facility] Waiting Room in the [facility].    Follow these instructions:  1.The day before the test drink extra water and also on the day of the test.  2. Nothing to eat or drink except water 3 hours prior.  3. No caffeine, smoking, or strenuous activity before test.    4. You will be receiving contrast. You will need pre-treatment if you have allergies to contrast dye or shellfish.   5. You will need to have a current creatinine level within 30 days if you  are over the age of 69, diabetic, or have a history of kidney problems.   6. HOLD these medications:   1. oral diabetic medications the morning of and wait 48 hours before  re-starting metformin.     2. Diuretic the day of.   3. NSAIDS (ibuprofen, naproxen) day of.  7. If your heart rate is above 90, please take this beta blocker the day or on day prior: Metoprolol    8. Testing takes about 15 min. Please allow 2 hours for test as you may need medications to slow your heart rate for the test.      If you have further questions, please utilize [employer] to contact us.   If your question concerns the above instructions, contact:  Ivett Dominguez LPN  Nurse Care Coordinator- Heart Care  [phone]                          Follow-ups after your visit        Additional Services     Follow-Up with Cardiologist                 Your next 10 appointments already scheduled     Aug 29, 2018  8:00 AM CDT   CTA ANGIOGRAM CORONARY ARTERY with UUCT4, UU CV CT NURSE   [facility], [place], [place] ([facility])    [address] [address] [phone]           Please allow two hours for this test.  Follow the instructions below:   The day before your exam, drink extra fluids at least six 8-ounce glasses (unless your doctor wants you to restrict your fluids).   No caffeine and no smoking the day of the test.   Do not eat or drink for 2 hours before your exam. You may take your morning medicines with small sips of water.   If you take Viagra, Levitra or Cialis, stop taking it for 48 hours before your test.  For patients with diabetes:   You may need a blood test (creatinine test) within 30 days of your exam; the Cardiologist/Radiologist may require you to get this test prior to your exam   If you are diabetic and take oral hypoglycemics, do not take them on the day of your test.  Bring any scans or X-rays taken at other hospitals, if similar tests were done. Also bring a  list of your medicines, including vitamins, minerals and over-the-counter drugs. It is safest to leave personal items at home.  Be sure to tell your doctor:   If you have any allergies, including any reaction to contrast.   If you are breastfeeding or there is a chance you are pregnant.  Please wear loose clothing, such as a sweat suit or jogging clothes. Avoid snaps, zippers and other metal. We may ask you to undress and put on a hospital gown.  If you have any questions, please call the Imaging Department where you will have your exam.            Nov 08, 2018  8:45 AM CST   LAB with  LAB   [facility] ([facility])    [address]  1st Floor  [facility] [address] [phone]           Please do not eat 10-12 hours before your appointment if you are coming in fasting for labs on lipids, cholesterol, or glucose (sugar). This does not apply to pregnant women. Water, hot tea and black coffee (with nothing added) are okay. Do not drink other fluids, diet soda or chew gum.            Nov 08, 2018  9:00 AM CST   (Arrive by 8:45 AM)   [facility] PREVENTATIVE VISIT with STEVE Gonzáles [facility] Cardiovascular Disease Prevention ([facility])    [address]  5th Floor  [facility] [address] [phone]           Please arrive 15 minutes prior to your appointment to obtain your laboratory samples. The abdominal ultrasound and laboratory tests require that you fast (no food, water only) for 12 hours and no alcohol for 24 hours prior to having your blood drawn.  You may take your medications with water. You are welcome to bring a snack to have after your laboratory sample has been obtained.  Please wear comfortable clothing and shoes for walking. We will check your blood pressure during a brief activity test using a treadmill. This is not a stress test.   You may find it more convenient not to wear panty hose since we  will need to apply EKG stickers to the lower legs.  A urine sample will be needed when you arrive at the clinic.  Your appointment will include a photo of the back of your eye. Although we do not need to dilate your eyes, you may need to remove contact lenses for this test. Please bring any equipment you need to remove your contacts or wear glasses.  Except for having blood drawn, all of the diagnostic tests are conducted on the surface of the body and involve little or no discomfort or risk to your health.  We look forward to seeing you at your upcoming appointment. If you have additional questions please contact Miri Boykin at [phone]            Mar 05, 2019 11:30 AM CST   (Arrive by 11:15 AM)   Return Visit with Zackery Cardozo MD   [facility] ([facility])    [address] [place] [address]  [address] [place] [address] [phone]              Future tests that were ordered for you today     Open Future Orders        Priority Expected Expires Ordered    Follow-Up with Cardiologist Routine 2/24/2019 5/25/2019 8/28/2018    Lipid panel reflex to direct LDL Fasting Routine 2/24/2019 5/25/2019 8/28/2018    Radiologist Consult For Cardiology Routine 8/28/2018 8/28/2019 8/28/2018    CT Angiogram coronary artery Routine 8/29/2018 8/28/2019 8/28/2018            Who to contact     If you have questions or need follow up information about today's clinic visit or your schedule please contact [facility] directly at [phone].  Normal or non-critical lab and imaging results will be communicated to you by MyChart, letter or phone within 4 business days after the clinic has received the results. If you do not hear from us within 7 days, please contact the clinic through MyChart or phone. If you have a critical or abnormal lab result, we will notify you by phone as soon as possible.  Submit refill requests through [employer] or call your pharmacy and they will forward  "the refill request to us. Please allow 3 business days for your refill to be completed.          Additional Information About Your Visit        [employer] [facility] Information     [employer] gives you secure access to your electronic health record. If you see a primary care provider, you can also send messages to your care team and make appointments. If you have questions, please call your primary care clinic.  If you do not have a primary care provider, please call [phone] and they will assist you.        Care EveryWhere ID     This is your Care EveryWhere ID. This could be used by other organizations to access your [place] medical records  [id number]        Your Vitals Were     Pulse Height Pulse Oximetry BMI (Body Mass Index)          70 1.778 m (5' 10\") 93% 29.57 kg/m2         Blood Pressure from Last 3 Encounters:   08/28/18 105/70   08/23/18 113/77   08/23/18 129/81    Weight from Last 3 Encounters:   08/28/18 93.5 kg (206 lb 1.6 oz)   08/23/18 92.9 kg (204 lb 12.9 oz)   08/23/18 93 kg (205 lb)                 Today's Medication Changes          These changes are accurate as of 8/28/18  3:39 PM.  If you have any questions, ask your nurse or doctor.               Start taking these medicines.        Dose/Directions    atorvastatin 40 MG tablet   Commonly known as:  LIPITOR   Used for:  Chest pain, unspecified type   Started by:  Zackery Cardozo MD        Dose:  40 mg   Take 1 tablet (40 mg) by mouth daily   Quantity:  90 tablet   Refills:  3         These medicines have changed or have updated prescriptions.        Dose/Directions    aspirin 81 MG tablet   This may have changed:    - medication strength  - how much to take   Used for:  Coronary artery disease involving native coronary artery of native heart, angina presence unspecified   Changed by:  Zackery Cardozo MD        Dose:  81 mg   Take 1 tablet (81 mg) by mouth daily   Quantity:  90 tablet   Refills:  3            Where to get your " medicines      These medications were sent to [facility] - [place], [place] - [address]  [address], [place] [address]     Phone:  [phone]     atorvastatin 40 MG tablet         Some of these will need a paper prescription and others can be bought over the counter.  Ask your nurse if you have questions.     You don't need a prescription for these medications     aspirin 81 MG tablet                Primary Care Provider Office Phone # Fax #    Joaquina Goldman [phone] [address]  [facility] [address]        Equal Access to Services     [facility]: Hadii aad ku hadasho Soomaali, waaxda luqadaha, qaybta kaalmada adeegyada, leonard hsu . So [facility] [phone].    ATENCIÓN: Si habla español, tiene a lin disposición servicios gratuitos de asistencia lingüística. [facility] [phone].    We comply with applicable federal civil rights laws and [place] laws. We do not discriminate on the basis of race, color, national origin, age, disability, sex, sexual orientation, or gender identity.            Thank you!     Thank you for choosing [facility]  for your care. Our goal is always to provide you with excellent care. Hearing back from our patients is one way we can continue to improve our services. Please take a few minutes to complete the written survey that you may receive in the mail after your visit with us. Thank you!             Your Updated Medication List - Protect others around you: Learn how to safely use, store and throw away your medicines at www.disposemymeds.org.          This list is accurate as of 8/28/18  3:39 PM.  Always use your most recent med list.                   Brand Name Dispense Instructions for use Diagnosis    aspirin 81 MG tablet     90 tablet    Take 1 tablet (81 mg) by mouth daily    Coronary artery disease involving native coronary artery of native heart, angina presence unspecified        atorvastatin 40 MG tablet    LIPITOR    90 tablet    Take 1 tablet (40 mg) by mouth daily    Chest pain, unspecified type       lisinopril-hydrochlorothiazide 20-25 MG per tablet    PRINZIDE/ZESTORETIC    30 tablet    Take 1 tablet by mouth every morning    Hypertension goal BP (blood pressure) < 140/90       LORazepam 0.5 MG tablet    ATIVAN    60 tablet    Take 1 tablet (0.5 mg) by mouth 2 times daily as needed    Isolated or specific phobia, Anxiety       metoprolol succinate 50 MG 24 hr tablet    TOPROL-XL    30 tablet    Take 1 tablet (50 mg) by mouth daily    Hypertension goal BP (blood pressure) < 140/90, Anxiety

## 2018-08-28 NOTE — NURSING NOTE
Chief Complaint   Patient presents with     New Patient     47 year old male with family history of early MI, PMH of anxiety, HTN and MDD present for ED follow for chest pain and to discuss stress test     Vitals were taken and medications were reconciled.  KIMBERLY Araiza  2:26 PM

## 2018-08-28 NOTE — TELEPHONE ENCOUNTER
FUTURE VISIT INFORMATION:    Date: 8/28/18    Time: 1430    Location:  Cardiology  REFERRAL INFORMATION:    Referring provider:  ED    Referring providers clinic:  ED    Reason for visit/diagnosis:  Chest pain            NOTES STATUS DETAILS   OFFICE NOTE from referring provider  Internal    OFFICE NOTE from other cardiologist  N/A    DISCHARGE SUMMARY from hospital  N/A    DISCHARGE REPORT from the ER Internal    OPERATIVE REPORT  N/A    MEDICATION LIST Internal    LABS     BMP Internal    CBC     N/A    CMP N/A    TSH     internal    Lipids Internal    DIAGNOSTIC PROCEDURES     EKG Internal    Monitor Reports N/A     N/A    IMAGING (DISC & REPORT)      ECHO  N/A    Stress Tests N/A    Cath N/A    MRI/MRA Received    CT/CTA Internal 2016    N/A

## 2018-08-29 ENCOUNTER — HOSPITAL ENCOUNTER (OUTPATIENT)
Dept: CT IMAGING | Facility: CLINIC | Age: 48
Discharge: HOME | End: 2018-08-29
Attending: INTERNAL MEDICINE | Admitting: INTERNAL MEDICINE
Payer: COMMERCIAL

## 2018-08-29 VITALS — SYSTOLIC BLOOD PRESSURE: 117 MMHG | HEART RATE: 57 BPM | DIASTOLIC BLOOD PRESSURE: 83 MMHG

## 2018-08-29 DIAGNOSIS — R07.89 ATYPICAL CHEST PAIN: ICD-10-CM

## 2018-08-29 PROCEDURE — 25000128 H RX IP 250 OP 636: Performed by: INTERNAL MEDICINE

## 2018-08-29 PROCEDURE — 25000132 ZZH RX MED GY IP 250 OP 250 PS 637: Performed by: INTERNAL MEDICINE

## 2018-08-29 PROCEDURE — 75574 CT ANGIO HRT W/3D IMAGE: CPT

## 2018-08-29 PROCEDURE — 75574 CT ANGIO HRT W/3D IMAGE: CPT | Mod: 26 | Performed by: INTERNAL MEDICINE

## 2018-08-29 RX ORDER — METOPROLOL TARTRATE 50 MG
50-100 TABLET ORAL
Status: COMPLETED | OUTPATIENT
Start: 2018-08-29 | End: 2018-08-29

## 2018-08-29 RX ORDER — ACYCLOVIR 200 MG/1
0-1 CAPSULE ORAL
Status: DISCONTINUED | OUTPATIENT
Start: 2018-08-29 | End: 2018-08-30 | Stop reason: HOSPADM

## 2018-08-29 RX ORDER — IOPAMIDOL 755 MG/ML
115 INJECTION, SOLUTION INTRAVASCULAR ONCE
Status: COMPLETED | OUTPATIENT
Start: 2018-08-29 | End: 2018-08-29

## 2018-08-29 RX ORDER — NITROGLYCERIN 0.4 MG/1
0.4 TABLET SUBLINGUAL
Status: DISCONTINUED | OUTPATIENT
Start: 2018-08-29 | End: 2018-08-30 | Stop reason: HOSPADM

## 2018-08-29 RX ORDER — METOPROLOL TARTRATE 1 MG/ML
5-15 INJECTION, SOLUTION INTRAVENOUS
Status: DISCONTINUED | OUTPATIENT
Start: 2018-08-29 | End: 2018-08-30 | Stop reason: HOSPADM

## 2018-08-29 RX ADMIN — METOPROLOL TARTRATE 25 MG: 25 TABLET, FILM COATED ORAL at 08:08

## 2018-08-29 RX ADMIN — IOPAMIDOL 115 ML: 755 INJECTION, SOLUTION INTRAVENOUS at 09:53

## 2018-08-29 RX ADMIN — NITROGLYCERIN 0.4 MG: 0.4 TABLET SUBLINGUAL at 09:35

## 2018-10-22 ENCOUNTER — TELEPHONE (OUTPATIENT)
Dept: GASTROENTEROLOGY | Facility: OUTPATIENT CENTER | Age: 48
End: 2018-10-22

## 2018-10-24 ENCOUNTER — TELEPHONE (OUTPATIENT)
Dept: GASTROENTEROLOGY | Facility: OUTPATIENT CENTER | Age: 48
End: 2018-10-24

## 2018-10-25 ENCOUNTER — TELEPHONE (OUTPATIENT)
Dept: GASTROENTEROLOGY | Facility: OUTPATIENT CENTER | Age: 48
End: 2018-10-25

## 2018-10-25 NOTE — TELEPHONE ENCOUNTER
Patient taking any blood thinners ? 81 mg aspirin    Heart disease ? Denies      Lung disease ? Denies      Sleep apnea ? Denies    Diabetic ? Denies    Kidney disease ? Denies    Dialysis ? n/a    Electronic implanted medical devices ? Denies    Are you taking any narcotic pain medication ? No   What is your daily dosage ?    PTSD ? N/a     Prep instructions reviewed with patient ? Patient declined review.  policy, MAC sedation plan reviewed. Advised patient to have someone stay with him post exam    Pharmacy : n/a    Indication for procedure :Colon cancer screening [Z12.11]  -    Referring provider :Joaquina Goldman MD     Arrival Time : 2:30 PM

## 2018-10-29 ENCOUNTER — TRANSFERRED RECORDS (OUTPATIENT)
Dept: HEALTH INFORMATION MANAGEMENT | Facility: CLINIC | Age: 48
End: 2018-10-29

## 2018-10-29 ENCOUNTER — DOCUMENTATION ONLY (OUTPATIENT)
Dept: GASTROENTEROLOGY | Facility: OUTPATIENT CENTER | Age: 48
End: 2018-10-29
Payer: COMMERCIAL

## 2018-10-31 LAB — COPATH REPORT: NORMAL

## 2018-11-27 PROBLEM — K57.30 DIVERTICULOSIS OF LARGE INTESTINE: Status: ACTIVE | Noted: 2018-11-27

## 2018-11-27 NOTE — PROGRESS NOTES
SUBJECTIVE:  Lev Copeland, a 47 year old male, is here to discuss the following issues:     follow-up colonoscopy and cardiology evaluation    CAD FOLLOW-UP   He has a history of nobstructive CAD on coronary CTA and strong FHX of CAD.  He had been having chest pain earlier this year and did see Cardiology in August.  Repeat coronary CTA showed no progresion of disease.  He was advised to continue ASA 81 mg QD and start atorvastatin 40 mg QD.      ADENOMATOUS COLON POLYP FOLLOW-UP  He had first screening colonoscopy in October which revealed 2 polyps which were removed.  Pathology showed 7 mm sessile serrated adenoma at hepatic flexure and a large (10 mm) tubulovillous adenoma of rectum.    He was advised to repeat colonoscopy in 3 yrs.  Diverticuli were also noted.    HYPERTENSION FOLLOW-UP    Current medication regimen includes lisinopril-HCTZ and toprol.     Patient reports no problems or side effects with the medication.  Headaches:  No  Dizziness: No  Patient does not check blood pressure outside of clinic.      ANXIETY FOLLOW-UP   He continues to take lorazepam PRN with no noted side effects.    He primarily uses this for speaking.    I prescribe him #60 tablets at a time and that typically lasts him 4-6 months.  He last got #60 about 5 months ago.    PHQ-9 SCORE 12/19/2017 12/19/2017 12/27/2017   PHQ-9 Total Score 7 7 5     [facility] Follow-up to PHQ 12/19/2017 12/27/2017 8/23/2018   PHQ-9 9. Suicide Ideation past 2 weeks Not at all Not at all Not at all     CHANDNI-7 SCORE 12/19/2017 12/27/2017 8/23/2018   Total Score 5 2 5         Problem list and histories reviewed & updated, as indicated.  Patient Active Problem List   Diagnosis     Isolated or specific phobia     Hypertension goal BP (blood pressure) < 140/90     Anxiety     Major depressive disorder, single episode, mild (H)     Adenomatous polyp of colon     Diverticulosis of large intestine       BP Readings from Last 3 Encounters:   11/29/18 105/69    08/29/18 117/83   08/28/18 105/70    Wt Readings from Last 3 Encounters:   11/29/18 206 lb (93.4 kg)   08/28/18 206 lb 1.6 oz (93.5 kg)   08/23/18 204 lb 12.9 oz (92.9 kg)            ROS:  12 point ROS of systems including Constitutional, Eyes, ENT, Respiratory, Cardiovascular, Gastroenterology, Genitourinary, Integumentary, Muscularskeletal, Neurologic, Psychiatric, and Hematologic/Lymphatic were all negative except for pertinent positives noted in my HPI.     OBJECTIVE:    /69  Pulse 76  Temp 95.6  F (35.3  C) (Oral)  Resp 16  Wt 206 lb (93.4 kg)  SpO2 93%  BMI 29.56 kg/m2  GEN:  no apparent distress  PSYCH:    Appearance: appropriately and casually dressed    Eye Contact: good  Behavior: calm  Attitude: cooperative    Speech:  normal rate, tone, latency, and volume    Thought Form: organized    Thought Content: no evidence of psychotic thought    Mood: euthymic    Affect: appropriate and in normal range    Insight: good       ASSESSMENT/PLAN:  1. Anxiety  stable/well controlled   - metoprolol succinate ER (TOPROL-XL) 50 MG 24 hr tablet; Take 1 tablet (50 mg) by mouth daily  Dispense: 90 tablet; Refill: 2  - LORazepam (ATIVAN) 0.5 MG tablet; Take 1 tablet (0.5 mg) by mouth 2 times daily as needed for anxiety  Dispense: 60 tablet; Refill: 0    2. Isolated or specific phobia  [place] Prescription Monitoring Program website checked - no concerning activity.  I renewed the lorazepam.   - LORazepam (ATIVAN) 0.5 MG tablet; Take 1 tablet (0.5 mg) by mouth 2 times daily as needed for anxiety  Dispense: 60 tablet; Refill: 0    3. Hypertension goal BP (blood pressure) < 140/90  stable/well controlled   - metoprolol succinate ER (TOPROL-XL) 50 MG 24 hr tablet; Take 1 tablet (50 mg) by mouth daily  Dispense: 90 tablet; Refill: 2  - lisinopril-hydrochlorothiazide (PRINZIDE/ZESTORETIC) 20-25 MG per tablet; Take 1 tablet by mouth every morning  Dispense: 90 tablet; Refill: 2    4. Adenomatous polyp of colon, unspecified  part of colon  Discussed that he had one large adenoma (10 mm) and that is why repeat colonoscopy in 3 years is recommended.      Return to Clinic in 9 months - sooner if needed for lorazepam refill.    Patient Instructions   Check with your insurance on the coverage of Shingrix (new shingles vaccine).   The Shingrix vaccination is a 2-shot series.  The second dose is given 6 months after the first.  (It cannot be given sooner than 2 months after the first dose - at the earliest.)  You should be aware that patients are reporting feeling a bit under the weather after the injection, including fatigue, malaise, and low-grade fever that may last a couple days.  Rarely patients can get a mild, shingles-like rash.   But these symptoms are much better than the Shingles disease.         Joaquina Goldman MD   [facility]

## 2018-11-29 ENCOUNTER — OFFICE VISIT (OUTPATIENT)
Dept: FAMILY MEDICINE | Facility: CLINIC | Age: 48
End: 2018-11-29
Payer: COMMERCIAL

## 2018-11-29 VITALS
SYSTOLIC BLOOD PRESSURE: 105 MMHG | RESPIRATION RATE: 16 BRPM | DIASTOLIC BLOOD PRESSURE: 69 MMHG | BODY MASS INDEX: 29.56 KG/M2 | TEMPERATURE: 95.6 F | HEART RATE: 76 BPM | WEIGHT: 206 LBS | OXYGEN SATURATION: 93 %

## 2018-11-29 DIAGNOSIS — F41.9 ANXIETY: Primary | ICD-10-CM

## 2018-11-29 DIAGNOSIS — F40.298 ISOLATED OR SPECIFIC PHOBIA: ICD-10-CM

## 2018-11-29 DIAGNOSIS — D12.6 ADENOMATOUS POLYP OF COLON, UNSPECIFIED PART OF COLON: ICD-10-CM

## 2018-11-29 DIAGNOSIS — I10 HYPERTENSION GOAL BP (BLOOD PRESSURE) < 140/90: ICD-10-CM

## 2018-11-29 PROBLEM — F32.0 MAJOR DEPRESSIVE DISORDER, SINGLE EPISODE, MILD (H): Status: RESOLVED | Noted: 2017-12-13 | Resolved: 2018-11-29

## 2018-11-29 PROCEDURE — 99214 OFFICE O/P EST MOD 30 MIN: CPT | Performed by: FAMILY MEDICINE

## 2018-11-29 RX ORDER — METOPROLOL SUCCINATE 50 MG/1
50 TABLET, EXTENDED RELEASE ORAL DAILY
Qty: 90 TABLET | Refills: 2 | Status: SHIPPED | OUTPATIENT
Start: 2018-11-29 | End: 2019-09-23

## 2018-11-29 RX ORDER — LORAZEPAM 0.5 MG/1
0.5 TABLET ORAL 2 TIMES DAILY PRN
Qty: 60 TABLET | Refills: 0 | Status: SHIPPED | OUTPATIENT
Start: 2018-11-29 | End: 2019-09-23

## 2018-11-29 RX ORDER — LISINOPRIL AND HYDROCHLOROTHIAZIDE 20; 25 MG/1; MG/1
1 TABLET ORAL EVERY MORNING
Qty: 90 TABLET | Refills: 2 | Status: SHIPPED | OUTPATIENT
Start: 2018-11-29 | End: 2019-03-12

## 2018-11-29 NOTE — PATIENT INSTRUCTIONS
Check with your insurance on the coverage of Shingrix (new shingles vaccine).   The Shingrix vaccination is a 2-shot series.  The second dose is given 6 months after the first.  (It cannot be given sooner than 2 months after the first dose - at the earliest.)  You should be aware that patients are reporting feeling a bit under the weather after the injection, including fatigue, malaise, and low-grade fever that may last a couple days.  Rarely patients can get a mild, shingles-like rash.   But these symptoms are much better than the Shingles disease.

## 2019-02-26 DIAGNOSIS — I10 HYPERTENSION GOAL BP (BLOOD PRESSURE) < 140/90: ICD-10-CM

## 2019-02-26 DIAGNOSIS — R07.9 CHEST PAIN, UNSPECIFIED TYPE: ICD-10-CM

## 2019-02-26 DIAGNOSIS — I25.10 CORONARY ARTERY DISEASE INVOLVING NATIVE CORONARY ARTERY OF NATIVE HEART, ANGINA PRESENCE UNSPECIFIED: ICD-10-CM

## 2019-02-26 PROCEDURE — 80061 LIPID PANEL: CPT | Performed by: INTERNAL MEDICINE

## 2019-02-26 PROCEDURE — 36415 COLL VENOUS BLD VENIPUNCTURE: CPT | Performed by: INTERNAL MEDICINE

## 2019-02-27 LAB
CHOLEST SERPL-MCNC: 104 MG/DL
HDLC SERPL-MCNC: 43 MG/DL
LDLC SERPL CALC-MCNC: 40 MG/DL
NONHDLC SERPL-MCNC: 61 MG/DL
TRIGL SERPL-MCNC: 103 MG/DL

## 2019-03-11 ASSESSMENT — ENCOUNTER SYMPTOMS: DIARRHEA: 1

## 2019-03-12 ENCOUNTER — OFFICE VISIT (OUTPATIENT)
Dept: CARDIOLOGY | Facility: CLINIC | Age: 49
End: 2019-03-12
Attending: INTERNAL MEDICINE
Payer: COMMERCIAL

## 2019-03-12 VITALS
BODY MASS INDEX: 29.06 KG/M2 | SYSTOLIC BLOOD PRESSURE: 96 MMHG | WEIGHT: 203 LBS | OXYGEN SATURATION: 93 % | DIASTOLIC BLOOD PRESSURE: 64 MMHG | HEART RATE: 68 BPM | HEIGHT: 70 IN

## 2019-03-12 DIAGNOSIS — I10 HYPERTENSION GOAL BP (BLOOD PRESSURE) < 140/90: ICD-10-CM

## 2019-03-12 DIAGNOSIS — I25.10 CORONARY ARTERY DISEASE INVOLVING NATIVE CORONARY ARTERY OF NATIVE HEART WITHOUT ANGINA PECTORIS: ICD-10-CM

## 2019-03-12 PROCEDURE — 99214 OFFICE O/P EST MOD 30 MIN: CPT | Mod: ZP | Performed by: INTERNAL MEDICINE

## 2019-03-12 PROCEDURE — G0463 HOSPITAL OUTPT CLINIC VISIT: HCPCS | Mod: ZF

## 2019-03-12 RX ORDER — LISINOPRIL 20 MG/1
20 TABLET ORAL DAILY
Qty: 90 TABLET | Refills: 3 | Status: SHIPPED | OUTPATIENT
Start: 2019-03-12 | End: 2019-05-30

## 2019-03-12 ASSESSMENT — MIFFLIN-ST. JEOR: SCORE: 1787.05

## 2019-03-12 ASSESSMENT — PAIN SCALES - GENERAL: PAINLEVEL: NO PAIN (0)

## 2019-03-12 NOTE — PROGRESS NOTES
Chief complaint: Follow up of HTN, CAD    HPI:   Lev Copeland is a 48 year old male with history of nonobstructive CAD (coronary CTA 2016, 8/2018), longstanding HTN, and family history of CAD who presents for follow up of his chronic cardiovascular conditions.     He was last seen by me 8/28/18 after an ED visit 8/14/18 for atypical chest pain. Exercise ECG was done and showed no ischemia, but in light of his higher pretest probability (known CAD based on prior coronary CTA 2016), Coronary CTA was done 8/29/18 and showed minimal (<25%) coronary atherosclerosis involving the proximal LAD and Agatston calcium score of 11.7 (all in the LAD), with no significant change from the prior study dated 2/5/16.     Since that time, he has continued exercising regularly (walking and running on the treadmill ~5x/week) without chest pain or dyspnea; his exertional capacity has actually increased-- he has run two 5Ks since his last visit.     He is taking flax seed oil and red yeast rice, and is wondering if he should continue taking these.     He denies any dyspnea at rest or with exertion, PND, orthopnea, peripheral edema, palpitations, lightheadedness or syncope.       PAST MEDICAL HISTORY:  Past Medical History:   Diagnosis Date     Hypertension goal BP (blood pressure) < 140/90      Isolated or specific phobia     performance     Major depressive disorder, single episode, mild (H)      Stuttering        CURRENT MEDICATIONS:  Current Outpatient Medications   Medication Sig Dispense Refill     aspirin 81 MG tablet Take 1 tablet (81 mg) by mouth daily 90 tablet 3     atorvastatin (LIPITOR) 40 MG tablet Take 1 tablet (40 mg) by mouth daily 90 tablet 3     lisinopril-hydrochlorothiazide (PRINZIDE/ZESTORETIC) 20-25 MG per tablet Take 1 tablet by mouth every morning 90 tablet 2     LORazepam (ATIVAN) 0.5 MG tablet Take 1 tablet (0.5 mg) by mouth 2 times daily as needed for anxiety 60 tablet 0     metoprolol succinate ER  "(TOPROL-XL) 50 MG 24 hr tablet Take 1 tablet (50 mg) by mouth daily 90 tablet 2       PAST SURGICAL HISTORY:  Past Surgical History:   Procedure Laterality Date     COLONOSCOPY  Oct. 2018     NO HISTORY OF SURGERY         ALLERGIES:   No Known Allergies    FAMILY HISTORY:  Family History   Problem Relation Age of Onset     Heart Disease Father 63        CAB x4 at age 63     Hypertension Sister    No family history of premature CAD or sudden death.    SOCIAL HISTORY:  Social History     Tobacco Use     Smoking status: Never Smoker     Smokeless tobacco: Never Used   Substance Use Topics     Alcohol use: Yes     Comment: 4 Drinks per week     Drug use: No       ROS:   A comprehensive 14 point review of systems is negative other than as mentioned in HPI.    Exam:  BP 96/64 (BP Location: Right arm, Patient Position: Chair, Cuff Size: Adult Large)   Pulse 68   Ht 1.778 m (5' 10\")   Wt 92.1 kg (203 lb)   SpO2 93%   BMI 29.13 kg/m    GENERAL APPEARANCE: healthy, alert and no distress  EYES: no icterus, no xanthelasmas  ENT: normal palate, mucosa moist, no central cyanosis  NECK: no adenopathy, no asymmetry, masses, or scars, thyroid normal to palpation and no bruits, JVP not elevated  RESPIRATORY: lungs clear to auscultation - no rales, rhonchi or wheezes, no use of accessory muscles, no retractions, respirations are unlabored, normal respiratory rate  CARDIOVASCULAR: regular rhythm, normal S1 with physiologic split S2, no S3 or S4 and no murmur, click or rub, precordium quiet with normal PMI.  GI: soft, non tender, without hepatosplenomegaly, no masses palpable, bowel sounds normal, aorta not enlarged by palpation, no abdominal bruits  EXTREMITIES: peripheral pulses normal, no edema, no bruits  NEURO: alert and oriented to person/place/time, normal speech, gait and affect  VASC: Radial, dorsalis pedis and posterior tibialis pulses 2+ bilaterally.  SKIN: no ecchymoses, no rashes.  PSYCH: cooperative, affect " appropriate.     Labs:  Reviewed. Of note:  Lipids 3/27/18: Chol 178 HDL 43 LDL 93    Lipids 2/26/19: Chol 104 HDL 43 LDL 40     Testing/Procedures:  Coronary CTA 8/2018: minimal (<25%) stenosis involving the proximal and mid-LAD with mixed plaque.    Coronary CTA 2016: minimal (<25%) stenosis involving the proximal and mid-LAD with mixed plaque.    Exercise ECG 8/23/18 (reviewed in CVIS):  Baseline ECG sinus, VR 63, no ST/T abnormalities or Q-waves.   Exercised 12:53, 13 METS, max  (96% MPHR.)   No angina elicited. No ECG evidence of ischemia at a diagnostic level of myocardial O2 demand.        Assessment and Plan:   1. CAD (with known non-obstructive coronary atherosclerosis on coronary CTA 2016)  2. Atypical chest pain, resolved  Stably free of anginal symptoms and with optimal lipids and increasing exercise capacity. Continue ASA 81 mg daily, atorvastatin 40 mg daily, Toprol XL 50 mg daily.   Counseled him to stop taking red yeast rice as it is duplicative of atorvastatin and could increase risk of statin muscle toxicity.   Regarding flax oil, I counseled him that the omega-3 fatty acids are poorly bioavailable and that if he wished to increase omega-3 intake, either eating fatty fish or a fish oil supplement would be a more effective source.     3. Essential HTN, improved:  With borderline-hypotensive BPs today (though asymptomatic), likely a consequence of his increased exercise.  Stop lisinopril-hydrochlorothiazide, transition to lisinopril 20 mg monotherapy (same dose of lisinopril as previously.)    The patient states understanding and is agreeable with plan.     Zackery Cardozo MD  Cardiology    CC  [facility]

## 2019-03-12 NOTE — LETTER
3/12/2019      RE: Lev Copeland  [address] [place] [address]       Dear Colleague,    Thank you for the opportunity to participate in the care of your patient, Lev Copeland, at the [facility]. Please see a copy of my visit note below.    Chief complaint: Follow up of HTN, CAD    HPI:   Lev Copeland is a 48 year old male with history of nonobstructive CAD (coronary CTA 2016, 8/2018), longstanding HTN, and family history of CAD who presents for follow up of his chronic cardiovascular conditions.     He was last seen by me 8/28/18 after an ED visit 8/14/18 for atypical chest pain. Exercise ECG was done and showed no ischemia, but in light of his higher pretest probability (known CAD based on prior coronary CTA 2016), Coronary CTA was done 8/29/18 and showed minimal (<25%) coronary atherosclerosis involving the proximal LAD and Agatston calcium score of 11.7 (all in the LAD), with no significant change from the prior study dated 2/5/16.     Since that time, he has continued exercising regularly (walking and running on the treadmill ~5x/week) without chest pain or dyspnea; his exertional capacity has actually increased-- he has run two 5Ks since his last visit.     He is taking flax seed oil and red yeast rice, and is wondering if he should continue taking these.     He denies any dyspnea at rest or with exertion, PND, orthopnea, peripheral edema, palpitations, lightheadedness or syncope.       PAST MEDICAL HISTORY:  Past Medical History:   Diagnosis Date     Hypertension goal BP (blood pressure) < 140/90      Isolated or specific phobia     performance     Major depressive disorder, single episode, mild (H)      Stuttering        CURRENT MEDICATIONS:  Current Outpatient Medications   Medication Sig Dispense Refill     aspirin 81 MG tablet Take 1 tablet (81 mg) by mouth daily 90 tablet 3     atorvastatin (LIPITOR) 40 MG tablet Take 1 tablet  "(40 mg) by mouth daily 90 tablet 3     lisinopril-hydrochlorothiazide (PRINZIDE/ZESTORETIC) 20-25 MG per tablet Take 1 tablet by mouth every morning 90 tablet 2     LORazepam (ATIVAN) 0.5 MG tablet Take 1 tablet (0.5 mg) by mouth 2 times daily as needed for anxiety 60 tablet 0     metoprolol succinate ER (TOPROL-XL) 50 MG 24 hr tablet Take 1 tablet (50 mg) by mouth daily 90 tablet 2       PAST SURGICAL HISTORY:  Past Surgical History:   Procedure Laterality Date     COLONOSCOPY  Oct. 2018     NO HISTORY OF SURGERY         ALLERGIES:   No Known Allergies    FAMILY HISTORY:  Family History   Problem Relation Age of Onset     Heart Disease Father 63        CAB x4 at age 63     Hypertension Sister    No family history of premature CAD or sudden death.    SOCIAL HISTORY:  Social History     Tobacco Use     Smoking status: Never Smoker     Smokeless tobacco: Never Used   Substance Use Topics     Alcohol use: Yes     Comment: 4 Drinks per week     Drug use: No       ROS:   A comprehensive 14 point review of systems is negative other than as mentioned in HPI.    Exam:  BP 96/64 (BP Location: Right arm, Patient Position: Chair, Cuff Size: Adult Large)   Pulse 68   Ht 1.778 m (5' 10\")   Wt 92.1 kg (203 lb)   SpO2 93%   BMI 29.13 kg/m     GENERAL APPEARANCE: healthy, alert and no distress  EYES: no icterus, no xanthelasmas  ENT: normal palate, mucosa moist, no central cyanosis  NECK: no adenopathy, no asymmetry, masses, or scars, thyroid normal to palpation and no bruits, JVP not elevated  RESPIRATORY: lungs clear to auscultation - no rales, rhonchi or wheezes, no use of accessory muscles, no retractions, respirations are unlabored, normal respiratory rate  CARDIOVASCULAR: regular rhythm, normal S1 with physiologic split S2, no S3 or S4 and no murmur, click or rub, precordium quiet with normal PMI.  GI: soft, non tender, without hepatosplenomegaly, no masses palpable, bowel sounds normal, aorta not enlarged by palpation, " no abdominal bruits  EXTREMITIES: peripheral pulses normal, no edema, no bruits  NEURO: alert and oriented to person/place/time, normal speech, gait and affect  VASC: Radial, dorsalis pedis and posterior tibialis pulses 2+ bilaterally.  SKIN: no ecchymoses, no rashes.  PSYCH: cooperative, affect appropriate.     Labs:  Reviewed. Of note:  Lipids 3/27/18: Chol 178 HDL 43 LDL 93    Lipids 2/26/19: Chol 104 HDL 43 LDL 40     Testing/Procedures:  Coronary CTA 8/2018: minimal (<25%) stenosis involving the proximal and mid-LAD with mixed plaque.    Coronary CTA 2016: minimal (<25%) stenosis involving the proximal and mid-LAD with mixed plaque.    Exercise ECG 8/23/18 (reviewed in CVIS):  Baseline ECG sinus, VR 63, no ST/T abnormalities or Q-waves.   Exercised 12:53, 13 METS, max  (96% MPHR.)   No angina elicited. No ECG evidence of ischemia at a diagnostic level of myocardial O2 demand.        Assessment and Plan:   1. CAD (with known non-obstructive coronary atherosclerosis on coronary CTA 2016)  2. Atypical chest pain, resolved  Stably free of anginal symptoms and with optimal lipids and increasing exercise capacity. Continue ASA 81 mg daily, atorvastatin 40 mg daily, Toprol XL 50 mg daily.   Counseled him to stop taking red yeast rice as it is duplicative of atorvastatin and could increase risk of statin muscle toxicity.   Regarding flax oil, I counseled him that the omega-3 fatty acids are poorly bioavailable and that if he wished to increase omega-3 intake, either eating fatty fish or a fish oil supplement would be a more effective source.     3. Essential HTN, improved:  With borderline-hypotensive BPs today (though asymptomatic), likely a consequence of his increased exercise.  Stop lisinopril-hydrochlorothiazide, transition to lisinopril 20 mg monotherapy (same dose of lisinopril as previously.)    The patient states understanding and is agreeable with plan.     Zackery Cardozo,  MD  Cardiology    CC  [facility]

## 2019-03-12 NOTE — NURSING NOTE
Chief Complaint   Patient presents with     Follow Up     48 year old male present for 6 month follow up with fasting labs prior     Vitals were taken and medications were reconciled.     Olivia Perry RMA  3:47 PM

## 2019-03-12 NOTE — PATIENT INSTRUCTIONS
You were seen at the [facility] clinic today.  You saw Dr. Zackery Cardozo  Here are your Instructions:    1. Stop lisinopril/hydrochlorothiazide  2. Start lisinopril 20 mg daily. Record BP at home for 1-2 weeks, and send recordings to your primary care doctor.   3. Stop red yeast rice.  4. Follow up with Dr. Cardozo in 1 year with fasting lab work prior.      If you have questions after this visit:  Send a [employer] message or contact Ivett Dominguez LPN  Office:  [phone] option #1, then #3 & ask for Ivett (nurse line)  Fax:  [phone]  After Hours:  [phone] option #4 ask to speak to he on-call Cardiologist  Appointments:  [phone] option #1, then option #1

## 2019-03-19 ENCOUNTER — MYC MEDICAL ADVICE (OUTPATIENT)
Dept: FAMILY MEDICINE | Facility: CLINIC | Age: 49
End: 2019-03-19

## 2019-03-19 NOTE — TELEPHONE ENCOUNTER
Dr Goldman please advise.  Or should patient be contacting cardiology as they had made the change last week.  Jaki Pfeiffer RN

## 2019-03-20 NOTE — TELEPHONE ENCOUNTER
BP Readings from Last 10 Encounters:   03/12/19 96/64   11/29/18 105/69   08/29/18 117/83   08/28/18 105/70   08/23/18 113/77   08/23/18 129/81   02/14/18 (!) 143/97   12/27/17 (!) 140/94   12/19/17 135/84   12/13/17 (!) 142/99

## 2019-04-30 NOTE — PROGRESS NOTES
"  SUBJECTIVE:   Lev Copeland is a 48 year old male who presents to clinic today for the following   health issues:       Back Pain       Duration: x 2 weeks        Specific cause: none    Description:   Location of pain: low back right  Character of pain: fullness and stiffness, intermittent  Pain radiation: none  New numbness or weakness in legs, not attributed to pain:  no     Intensity: Currently 1/10    History:   Pain interferes with job: Not applicable  History of back problems: no significant prior back problems  Any previous MRI or X-rays: None  Sees a specialist for back pain:  No - just chirporactor  Therapies tried without relief: None    Alleviating factors:   Improved by: acetaminophen (Tylenol), chiropractor and heat      Precipitating factors:  Worsened by: Lifting and Bending    History of intermittent back pain - every 6 months.  Sees chiropractor for this which helps.  This episode is worse.  One night had right testicular pain - kept him up one night.  Some abdominal \"tenderness\" - but not really \"pain\".  No recent changes in bowel or bladder habits. No hematuria.  No saddle anesthesia.  Back pain is improving since seeing chiropractor and doing stretches at home.      Accompanying Signs & Symptoms:  Risk of Fracture:  None  Risk of Cauda Equina:  None  Risk of Infection:  None  Risk of Cancer:  None  Risk of Ankylosing Spondylitis:  Onset at age <35, male, AND morning back stiffness. no          Additional history: as documented    Reviewed  and updated as needed this visit by clinical staff  Tobacco  Allergies  Meds  Problems  Med Hx  Surg Hx  Fam Hx  Soc Hx          Reviewed and updated as needed this visit by Provider  Meds  Problems         BP Readings from Last 3 Encounters:   05/02/19 134/90   03/12/19 96/64   11/29/18 105/69    Wt Readings from Last 3 Encounters:   05/02/19 94.8 kg (209 lb)   03/12/19 92.1 kg (203 lb)   11/29/18 93.4 kg (206 lb)         "     ROS:  Constitutional, HEENT, cardiovascular, pulmonary, GI, , musculoskeletal, neuro, skin, endocrine and psych systems are negative, except as otherwise noted.    OBJECTIVE:     /90 (BP Location: Right arm, Patient Position: Sitting, Cuff Size: Adult Large)   Pulse 65   Temp 96.8  F (36  C) (Tympanic)   Resp 16   Wt 94.8 kg (209 lb)   SpO2 94%   BMI 29.99 kg/m    Body mass index is 29.99 kg/m .  GEN:  no apparent distress  BACK:  Mildly decreased lumbar lordosis.  No focal tenderness to palpation over the spinous processes, paraspinal muscles and SI joints.    NEURO:  LE DTR's are 2+ and symmetric.  Great toe dorsiflexion strength intact.  Negative SLR.  ABD:  soft, mild lower abdominal tenderness centrally with no rebound/guarding, no mass, no hepatosplenomegaly, no hernias  :  Scrotum without redness or edema.  Testicles without masses or focal tenderness to palpation.  No inguinal hernias.       ASSESSMENT/PLAN:       1. Acute right-sided low back pain without sciatica  Improving with conservative cares.  Given recurrence of his symptoms I suggested Physical Therapy and he wanted to pursue that.    - [facility]; Future    2. Testicular pain, right  Unclear etiology - improved spontaneously.  Discussed that I am not finding any abnormalities on exam - no swelling, masses or erythema to suggest hernia, orchitis, testicular torsion, testicular cancer, epididymitis.  If pain recurs he will see Urology.  - UROLOGY ADULT REFERRAL    3. Hypertension goal BP (blood pressure) < 140/90  suboptimal control today but usually blood pressure is well controlled.  We discussed option of increasing lisinopril dose but will hold off for now unless blood pressures remain elevated.         Joaquina Goldman MD  [facility]

## 2019-05-02 ENCOUNTER — OFFICE VISIT (OUTPATIENT)
Dept: FAMILY MEDICINE | Facility: CLINIC | Age: 49
End: 2019-05-02
Payer: COMMERCIAL

## 2019-05-02 VITALS
OXYGEN SATURATION: 94 % | BODY MASS INDEX: 29.99 KG/M2 | SYSTOLIC BLOOD PRESSURE: 134 MMHG | RESPIRATION RATE: 16 BRPM | DIASTOLIC BLOOD PRESSURE: 90 MMHG | HEART RATE: 65 BPM | TEMPERATURE: 96.8 F | WEIGHT: 209 LBS

## 2019-05-02 DIAGNOSIS — M54.50 ACUTE RIGHT-SIDED LOW BACK PAIN WITHOUT SCIATICA: Primary | ICD-10-CM

## 2019-05-02 DIAGNOSIS — I10 HYPERTENSION GOAL BP (BLOOD PRESSURE) < 140/90: ICD-10-CM

## 2019-05-02 DIAGNOSIS — N50.811 TESTICULAR PAIN, RIGHT: ICD-10-CM

## 2019-05-02 PROCEDURE — 99214 OFFICE O/P EST MOD 30 MIN: CPT | Performed by: FAMILY MEDICINE

## 2019-05-07 ENCOUNTER — MYC MEDICAL ADVICE (OUTPATIENT)
Dept: FAMILY MEDICINE | Facility: CLINIC | Age: 49
End: 2019-05-07

## 2019-05-07 NOTE — TELEPHONE ENCOUNTER
Dr Goldman please advise.  Should patient be seen here or by hematology?  Can we do the lab testing?  Jaki Pfeiffer RN

## 2019-05-08 NOTE — TELEPHONE ENCOUNTER
We can check for the PT Gene mutation here (does not need to see Heme) but as it is a genetic test he does need to sign the genetic consent so it would be best if we do this as part of an office visit with me.

## 2019-05-30 ENCOUNTER — OFFICE VISIT (OUTPATIENT)
Dept: FAMILY MEDICINE | Facility: CLINIC | Age: 49
End: 2019-05-30
Payer: COMMERCIAL

## 2019-05-30 VITALS
BODY MASS INDEX: 29.7 KG/M2 | RESPIRATION RATE: 16 BRPM | SYSTOLIC BLOOD PRESSURE: 132 MMHG | DIASTOLIC BLOOD PRESSURE: 86 MMHG | HEART RATE: 64 BPM | OXYGEN SATURATION: 96 % | TEMPERATURE: 98.1 F | WEIGHT: 207 LBS

## 2019-05-30 DIAGNOSIS — Z83.2 FHX: PROTHROMBIN GENE MUTATION: ICD-10-CM

## 2019-05-30 DIAGNOSIS — Z84.89 FAMILY HISTORY OF GENETIC DISORDER: Primary | ICD-10-CM

## 2019-05-30 DIAGNOSIS — I10 HYPERTENSION GOAL BP (BLOOD PRESSURE) < 140/90: ICD-10-CM

## 2019-05-30 PROCEDURE — 81240 F2 GENE: CPT | Performed by: FAMILY MEDICINE

## 2019-05-30 PROCEDURE — 36415 COLL VENOUS BLD VENIPUNCTURE: CPT | Performed by: FAMILY MEDICINE

## 2019-05-30 PROCEDURE — 99213 OFFICE O/P EST LOW 20 MIN: CPT | Performed by: FAMILY MEDICINE

## 2019-05-30 RX ORDER — LISINOPRIL 40 MG/1
40 TABLET ORAL DAILY
Qty: 90 TABLET | Refills: 3 | Status: SHIPPED | OUTPATIENT
Start: 2019-05-30 | End: 2019-06-26

## 2019-05-30 ASSESSMENT — ANXIETY QUESTIONNAIRES
2. NOT BEING ABLE TO STOP OR CONTROL WORRYING: NOT AT ALL
GAD7 TOTAL SCORE: 0
GAD7 TOTAL SCORE: 0
7. FEELING AFRAID AS IF SOMETHING AWFUL MIGHT HAPPEN: NOT AT ALL
7. FEELING AFRAID AS IF SOMETHING AWFUL MIGHT HAPPEN: NOT AT ALL
4. TROUBLE RELAXING: NOT AT ALL
5. BEING SO RESTLESS THAT IT IS HARD TO SIT STILL: NOT AT ALL
1. FEELING NERVOUS, ANXIOUS, OR ON EDGE: NOT AT ALL
GAD7 TOTAL SCORE: 0
6. BECOMING EASILY ANNOYED OR IRRITABLE: NOT AT ALL
3. WORRYING TOO MUCH ABOUT DIFFERENT THINGS: NOT AT ALL

## 2019-05-30 ASSESSMENT — PATIENT HEALTH QUESTIONNAIRE - PHQ9
SUM OF ALL RESPONSES TO PHQ QUESTIONS 1-9: 0
SUM OF ALL RESPONSES TO PHQ QUESTIONS 1-9: 0
10. IF YOU CHECKED OFF ANY PROBLEMS, HOW DIFFICULT HAVE THESE PROBLEMS MADE IT FOR YOU TO DO YOUR WORK, TAKE CARE OF THINGS AT HOME, OR GET ALONG WITH OTHER PEOPLE: NOT DIFFICULT AT ALL

## 2019-05-30 NOTE — PROGRESS NOTES
Subjective     Lev Copeland is a 48 year old male who presents to clinic today for the following health issues:    HPI     Dad has had several DVTs in his later years and was recently diagnosed with PT gene mutation.    His hematologist has recommended that his children also get tested for this mutation.    Lev has never had a DVT.  He is a nonsmoker.  He is not aware of any other family history of clotting or recurrent miscarriages.       BP Readings from Last 3 Encounters:   05/30/19 132/86   05/02/19 134/90   03/12/19 96/64    Wt Readings from Last 3 Encounters:   05/30/19 93.9 kg (207 lb)   05/02/19 94.8 kg (209 lb)   03/12/19 92.1 kg (203 lb)            HYPERTENSION FOLLOW-UP    Current medication regimen includes lisinopril 20 mg and toprol 50 mg QD.    Patient reports no problems or side effects with the medication.  Patient does check blood pressure outside of clinic.    Blood pressures are running 117/76 - 152/99 with average of 138/85.        Reviewed and updated as needed this visit by Provider  Meds         Review of Systems   RESP:  NEGATIVE for shortness of breath  CV:  NEGATIVE for chest pain        Objective    /86 (BP Location: Right arm, Patient Position: Sitting, Cuff Size: Adult Regular)   Pulse 64   Temp 98.1  F (36.7  C) (Tympanic)   Resp 16   Wt 93.9 kg (207 lb)   SpO2 96%   BMI 29.70 kg/m    Body mass index is 29.7 kg/m .  Physical Exam   GEN:  no apparent distress         Assessment & Plan     1. Family history of genetic disorder  2. FHx: prothrombin gene mutation  We reviewed that if he is a carrier of the PT Gene mutation the only change in management would be to minimize other risk factors for thrombosis, including avoidance of tobacco, frequent movement of legs on long car trips or long flights, possible thromboprophylaxis for prolonged medical immobility.  I would also recommend that his kids be tested if he is positive.   We both signed genetic testing consent form.     - F2 prothrombin 64258U Mut Anal    3. Hypertension goal BP (blood pressure) < 140/90  suboptimal blood pressure control.  We'll increase his lisinopril dose to 40 mg QD.   - lisinopril (PRINIVIL/ZESTRIL) 40 MG tablet; Take 1 tablet (40 mg) by mouth daily  Dispense: 90 tablet; Refill: 3  - **Basic metabolic panel FUTURE 14d; Future        Patient Instructions   Increase your lisinopril dose to 40 mg daily and monitor home blood pressure readings.      Schedule lab-only appointment in 2-4 weeks so we can monitor your kidney function (creatinine and eGFR) and potassium on the dose change.        Return in about 2 weeks (around 6/13/2019) for Lab-only Appointment.    Joaquina Goldman MD  [facility]

## 2019-05-30 NOTE — PATIENT INSTRUCTIONS
Increase your lisinopril dose to 40 mg daily and monitor home blood pressure readings.      Schedule lab-only appointment in 2-4 weeks so we can monitor your kidney function (creatinine and eGFR) and potassium on the dose change.

## 2019-05-31 ASSESSMENT — PATIENT HEALTH QUESTIONNAIRE - PHQ9: SUM OF ALL RESPONSES TO PHQ QUESTIONS 1-9: 0

## 2019-05-31 ASSESSMENT — ANXIETY QUESTIONNAIRES: GAD7 TOTAL SCORE: 0

## 2019-06-03 PROBLEM — D68.52 PROTHROMBIN GENE MUTATION (H): Status: ACTIVE | Noted: 2019-06-03

## 2019-06-03 LAB — COPATH REPORT: NORMAL

## 2019-06-04 NOTE — RESULT ENCOUNTER NOTE
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  Given that you are positive you may want to consider testing your children.  For example, this could impact your daughter's future decisions about birth control methods as estrogen-based contraceptives (like the birth control pill) also increase the risk of clotting and if she carries the gene she may need to consider other (non-estrogen) contraceptive methods.      Joaquina Goldman MD

## 2019-06-26 ENCOUNTER — MYC MEDICAL ADVICE (OUTPATIENT)
Dept: FAMILY MEDICINE | Facility: CLINIC | Age: 49
End: 2019-06-26

## 2019-06-26 DIAGNOSIS — I10 HYPERTENSION GOAL BP (BLOOD PRESSURE) < 140/90: ICD-10-CM

## 2019-06-26 RX ORDER — LISINOPRIL AND HYDROCHLOROTHIAZIDE 20; 25 MG/1; MG/1
1 TABLET ORAL EVERY MORNING
Qty: 90 TABLET | Refills: 0 | Status: SHIPPED | OUTPATIENT
Start: 2019-06-26 | End: 2019-09-23

## 2019-06-26 NOTE — TELEPHONE ENCOUNTER
Joaquina Goldman MD,  Please see patient's MyChart message regarding BP update    BP readings: 163/89, 161/101, 172/90, 153/97    Pharmacy cued    Please advise    Thank You!  Anamaria Worrell, AUGUSTINE  Triage Nurse

## 2019-09-23 ENCOUNTER — MYC REFILL (OUTPATIENT)
Dept: FAMILY MEDICINE | Facility: CLINIC | Age: 49
End: 2019-09-23

## 2019-09-23 DIAGNOSIS — F40.298 ISOLATED OR SPECIFIC PHOBIA: ICD-10-CM

## 2019-09-23 DIAGNOSIS — F41.9 ANXIETY: ICD-10-CM

## 2019-09-23 DIAGNOSIS — I10 HYPERTENSION GOAL BP (BLOOD PRESSURE) < 140/90: ICD-10-CM

## 2019-09-23 RX ORDER — LISINOPRIL AND HYDROCHLOROTHIAZIDE 20; 25 MG/1; MG/1
1 TABLET ORAL EVERY MORNING
Qty: 30 TABLET | Refills: 0 | Status: SHIPPED | OUTPATIENT
Start: 2019-09-23 | End: 2019-10-16

## 2019-09-23 RX ORDER — METOPROLOL SUCCINATE 50 MG/1
50 TABLET, EXTENDED RELEASE ORAL DAILY
Qty: 90 TABLET | Refills: 2 | Status: SHIPPED | OUTPATIENT
Start: 2019-09-23 | End: 2020-03-05

## 2019-09-23 RX ORDER — LORAZEPAM 0.5 MG/1
0.5 TABLET ORAL 2 TIMES DAILY PRN
Qty: 60 TABLET | Refills: 0 | Status: SHIPPED | OUTPATIENT
Start: 2019-09-23 | End: 2020-03-05

## 2019-09-23 NOTE — TELEPHONE ENCOUNTER
"Routing refill request to provider for review/approval because:  --Labs not current:  For lisinopril/hctz.  --Looks like patient was told a couple times that he needs this lab but not completed yet.  --Do you want office visit for refill request for Ativan?     ,  --Please contact patient and ask him to schedule a lab-only to monitor his HTN meds.    --A refill request for below medication was sent to the provider.            --Plan in last office visit 5/30/19 - \"Increase your lisinopril dose to 40 mg daily and monitor home blood pressure readings.    Schedule lab-only appointment in 2-4 weeks so we can monitor your kidney function (creatinine and eGFR) and potassium on the dose change.   Return in about 2 weeks (around 6/13/2019) for Lab-only Appointment.\"    6/26/19 MyChart discussion\" Hi Lev, Yes, I agree with you.  You seem to really need that HCTZ.     I sent a new prescription for your old combo lisinopril-HCTZ at the old dose (20 mg of lisinopril and 25 mg HCTZ).   Hold off on the lab visit until you've been back on the lisinopril-HCTZ for a few weeks. If you start to get low blood pressures again I think the next step would be to just drop the HCTZ dose a bit.  There is another combo that has 20 mg of lisinopril and just 12.5 mg of HCTZ.   Joaquina Goldman MD\"       Requested Prescriptions   Pending Prescriptions Disp Refills     metoprolol succinate ER (TOPROL-XL) 50 MG 24 hr tablet 90 tablet 2     Sig: Take 1 tablet (50 mg) by mouth daily       Beta-Blockers Protocol Passed - 9/23/2019  6:51 AM        Passed - Blood pressure under 140/90 in past 12 months     BP Readings from Last 3 Encounters:   05/30/19 132/86   05/02/19 134/90   03/12/19 96/64                 Passed - Patient is age 6 or older        Passed - Recent (12 mo) or future (30 days) visit within the authorizing provider's specialty     Patient had office visit in the last 12 months or has a visit in the next 30 days with " "authorizing provider or within the authorizing provider's specialty.  See \"Patient Info\" tab in inbasket, or \"Choose Columns\" in Meds & Orders section of the refill encounter.              Passed - Medication is active on med list        LORazepam (ATIVAN) 0.5 MG tablet 60 tablet 0     Sig: Take 1 tablet (0.5 mg) by mouth 2 times daily as needed for anxiety       There is no refill protocol information for this order        lisinopril-hydrochlorothiazide (PRINZIDE/ZESTORETIC) 20-25 MG tablet 90 tablet 0     Sig: Take 1 tablet by mouth every morning       Diuretics (Including Combos) Protocol Failed - 9/23/2019  6:51 AM        Failed - Normal serum creatinine on file in past 12 months     Recent Labs   Lab Test 08/23/18  1314   CR 0.95              Failed - Normal serum potassium on file in past 12 months     Recent Labs   Lab Test 08/23/18  1314   POTASSIUM 3.4                    Failed - Normal serum sodium on file in past 12 months     Recent Labs   Lab Test 08/23/18  1314                 Passed - Blood pressure under 140/90 in past 12 months     BP Readings from Last 3 Encounters:   05/30/19 132/86   05/02/19 134/90   03/12/19 96/64                 Passed - Recent (12 mo) or future (30 days) visit within the authorizing provider's specialty     Patient had office visit in the last 12 months or has a visit in the next 30 days with authorizing provider or within the authorizing provider's specialty.  See \"Patient Info\" tab in inbasket, or \"Choose Columns\" in Meds & Orders section of the refill encounter.              Passed - Medication is active on med list        Passed - Patient is age 18 or older          "

## 2019-09-26 DIAGNOSIS — I10 HYPERTENSION GOAL BP (BLOOD PRESSURE) < 140/90: ICD-10-CM

## 2019-09-26 LAB
ANION GAP SERPL CALCULATED.3IONS-SCNC: 8 MMOL/L (ref 3–14)
BUN SERPL-MCNC: 17 MG/DL (ref 7–30)
CALCIUM SERPL-MCNC: 9.2 MG/DL (ref 8.5–10.1)
CHLORIDE SERPL-SCNC: 106 MMOL/L (ref 94–109)
CO2 SERPL-SCNC: 25 MMOL/L (ref 20–32)
CREAT SERPL-MCNC: 0.92 MG/DL (ref 0.66–1.25)
GFR SERPL CREATININE-BSD FRML MDRD: >90 ML/MIN/{1.73_M2}
GLUCOSE SERPL-MCNC: 104 MG/DL (ref 70–99)
POTASSIUM SERPL-SCNC: 3.3 MMOL/L (ref 3.4–5.3)
SODIUM SERPL-SCNC: 139 MMOL/L (ref 133–144)

## 2019-09-26 PROCEDURE — 80048 BASIC METABOLIC PNL TOTAL CA: CPT | Performed by: FAMILY MEDICINE

## 2019-09-26 PROCEDURE — 36415 COLL VENOUS BLD VENIPUNCTURE: CPT | Performed by: FAMILY MEDICINE

## 2019-09-26 NOTE — RESULT ENCOUNTER NOTE
Hi Lev,  Your basic metabolic panel results show stable kidney function (creatinine and eGFR) but low potassium.  That's due to the HCTZ.  Options are to switch your to lisinopril-HCTZ 20 mg-12.5 mg (less HCTZ).  Or you could add more potassium to your diet.  This would include potatoes, bananas, watermelon, and dried fruit.   How are your home blood pressure readings?   I will be out of the clinic until next Wednesday (in case you send a message and don't hear back right away).    Joaquina Goldman MD

## 2019-10-15 ENCOUNTER — MYC MEDICAL ADVICE (OUTPATIENT)
Dept: FAMILY MEDICINE | Facility: CLINIC | Age: 49
End: 2019-10-15

## 2019-10-15 DIAGNOSIS — E87.6 HYPOKALEMIA: Primary | ICD-10-CM

## 2019-10-15 DIAGNOSIS — I10 HYPERTENSION GOAL BP (BLOOD PRESSURE) < 140/90: ICD-10-CM

## 2019-10-15 DIAGNOSIS — E87.6 HYPOKALEMIA: ICD-10-CM

## 2019-10-15 DIAGNOSIS — I25.10 CORONARY ARTERY DISEASE INVOLVING NATIVE CORONARY ARTERY OF NATIVE HEART WITHOUT ANGINA PECTORIS: ICD-10-CM

## 2019-10-15 LAB
ANION GAP SERPL CALCULATED.3IONS-SCNC: 5 MMOL/L (ref 3–14)
BUN SERPL-MCNC: 17 MG/DL (ref 7–30)
CALCIUM SERPL-MCNC: 9 MG/DL (ref 8.5–10.1)
CHLORIDE SERPL-SCNC: 105 MMOL/L (ref 94–109)
CHOLEST SERPL-MCNC: 135 MG/DL
CO2 SERPL-SCNC: 26 MMOL/L (ref 20–32)
CREAT SERPL-MCNC: 0.95 MG/DL (ref 0.66–1.25)
GFR SERPL CREATININE-BSD FRML MDRD: >90 ML/MIN/{1.73_M2}
GLUCOSE SERPL-MCNC: 99 MG/DL (ref 70–99)
HDLC SERPL-MCNC: 44 MG/DL
LDLC SERPL CALC-MCNC: 61 MG/DL
NONHDLC SERPL-MCNC: 91 MG/DL
POTASSIUM SERPL-SCNC: 3.7 MMOL/L (ref 3.4–5.3)
SODIUM SERPL-SCNC: 136 MMOL/L (ref 133–144)
TRIGL SERPL-MCNC: 152 MG/DL

## 2019-10-15 PROCEDURE — 80061 LIPID PANEL: CPT | Performed by: NURSE PRACTITIONER

## 2019-10-15 PROCEDURE — 36415 COLL VENOUS BLD VENIPUNCTURE: CPT | Performed by: NURSE PRACTITIONER

## 2019-10-15 PROCEDURE — 80048 BASIC METABOLIC PNL TOTAL CA: CPT | Performed by: NURSE PRACTITIONER

## 2019-10-15 NOTE — TELEPHONE ENCOUNTER
Dr. Goldman/Covering providers-Please review and sign if agree.  Lab pended.    Thank you!  TORITO BernardN, RN

## 2019-10-16 RX ORDER — LISINOPRIL AND HYDROCHLOROTHIAZIDE 20; 25 MG/1; MG/1
1 TABLET ORAL EVERY MORNING
Qty: 90 TABLET | Refills: 0 | Status: SHIPPED | OUTPATIENT
Start: 2019-10-16 | End: 2020-01-19

## 2019-10-16 NOTE — TELEPHONE ENCOUNTER
Joaquina Goldman MD/RUBINA:  Patient requesting 90-day supply of lisinopril-hydrochlorothiazide (PRINZIDE/ZESTORETIC) 20-25 MG tablet now that labs have normalized    Medication cued    Please advise    Thank You!  Anamaria Worrell RN  Triage Nurse

## 2019-10-25 DIAGNOSIS — I25.10 CORONARY ARTERY DISEASE INVOLVING NATIVE CORONARY ARTERY OF NATIVE HEART WITHOUT ANGINA PECTORIS: Primary | ICD-10-CM

## 2019-10-25 DIAGNOSIS — I10 HYPERTENSION GOAL BP (BLOOD PRESSURE) < 140/90: ICD-10-CM

## 2019-12-03 ENCOUNTER — MYC REFILL (OUTPATIENT)
Dept: CARDIOLOGY | Facility: CLINIC | Age: 49
End: 2019-12-03

## 2019-12-03 DIAGNOSIS — R07.9 CHEST PAIN, UNSPECIFIED TYPE: ICD-10-CM

## 2019-12-03 RX ORDER — ATORVASTATIN CALCIUM 40 MG/1
40 TABLET, FILM COATED ORAL DAILY
Qty: 90 TABLET | Refills: 1 | Status: SHIPPED | OUTPATIENT
Start: 2019-12-03 | End: 2020-03-03

## 2020-01-19 ENCOUNTER — MYC REFILL (OUTPATIENT)
Dept: FAMILY MEDICINE | Facility: CLINIC | Age: 50
End: 2020-01-19

## 2020-01-19 DIAGNOSIS — I10 HYPERTENSION GOAL BP (BLOOD PRESSURE) < 140/90: ICD-10-CM

## 2020-01-20 DIAGNOSIS — I10 HYPERTENSION GOAL BP (BLOOD PRESSURE) < 140/90: ICD-10-CM

## 2020-01-20 NOTE — TELEPHONE ENCOUNTER
"Requested Prescriptions   Pending Prescriptions Disp Refills     lisinopril-hydrochlorothiazide (PRINZIDE/ZESTORETIC) 20-25 MG tablet 90 tablet 0     Sig: Take 1 tablet by mouth every morning  Last Written Prescription Date:  10/16/2019  Last Fill Quantity: 90 tablet,  # refills: 0   Last Office Visit: 5/30/2019   Future Office Visit:            Diuretics (Including Combos) Protocol Passed - 1/20/2020  7:01 AM        Passed - Blood pressure under 140/90 in past 12 months     BP Readings from Last 3 Encounters:   05/30/19 132/86   05/02/19 134/90   03/12/19 96/64           Passed - Recent (12 mo) or future (30 days) visit within the authorizing provider's specialty     Patient has had an office visit with the authorizing provider or a provider within the authorizing providers department within the previous 12 mos or has a future within next 30 days. See \"Patient Info\" tab in inbasket, or \"Choose Columns\" in Meds & Orders section of the refill encounter.            Passed - Medication is active on med list        Passed - Patient is age 18 or older        Passed - Normal serum creatinine on file in past 12 months     Recent Labs   Lab Test 10/15/19  1051   CR 0.95            Passed - Normal serum potassium on file in past 12 months     Recent Labs   Lab Test 10/15/19  1051   POTASSIUM 3.7            Passed - Normal serum sodium on file in past 12 months     Recent Labs   Lab Test 10/15/19  1051                   "

## 2020-01-20 NOTE — TELEPHONE ENCOUNTER
"Requested Prescriptions   Pending Prescriptions Disp Refills     lisinopril-hydrochlorothiazide (PRINZIDE/ZESTORETIC) 20-25 MG tablet [Pharmacy Med Name: LISINOPRIL-HYDROCHLOROTH 20-25 TABS] 90 tablet 0     Sig: TAKE ONE TABLET BY MOUTH EVERY MORNING  Last Written Prescription Date:  10/16/2019  Last Fill Quantity: 90 tablet,  # refills: 0   Last Office Visit: 5/30/2019   Future Office Visit:            Diuretics (Including Combos) Protocol Passed - 1/20/2020  7:15 AM        Passed - Blood pressure under 140/90 in past 12 months     BP Readings from Last 3 Encounters:   05/30/19 132/86   05/02/19 134/90   03/12/19 96/64           Passed - Recent (12 mo) or future (30 days) visit within the authorizing provider's specialty     Patient has had an office visit with the authorizing provider or a provider within the authorizing providers department within the previous 12 mos or has a future within next 30 days. See \"Patient Info\" tab in inbasket, or \"Choose Columns\" in Meds & Orders section of the refill encounter.            Passed - Medication is active on med list        Passed - Patient is age 18 or older        Passed - Normal serum creatinine on file in past 12 months     Recent Labs   Lab Test 10/15/19  1051   CR 0.95            Passed - Normal serum potassium on file in past 12 months     Recent Labs   Lab Test 10/15/19  1051   POTASSIUM 3.7            Passed - Normal serum sodium on file in past 12 months     Recent Labs   Lab Test 10/15/19  1051                   "

## 2020-01-22 ENCOUNTER — MYC MEDICAL ADVICE (OUTPATIENT)
Dept: FAMILY MEDICINE | Facility: CLINIC | Age: 50
End: 2020-01-22

## 2020-01-22 RX ORDER — LISINOPRIL AND HYDROCHLOROTHIAZIDE 20; 25 MG/1; MG/1
1 TABLET ORAL EVERY MORNING
Qty: 90 TABLET | Refills: 0 | Status: SHIPPED | OUTPATIENT
Start: 2020-01-22 | End: 2020-03-05

## 2020-01-22 RX ORDER — LISINOPRIL AND HYDROCHLOROTHIAZIDE 20; 25 MG/1; MG/1
TABLET ORAL
Qty: 90 TABLET | Refills: 0 | OUTPATIENT
Start: 2020-01-22

## 2020-01-22 NOTE — TELEPHONE ENCOUNTER
Prescription approved per [facility] Refill Protocol.  ANGELICA Fleming, BSN, RN

## 2020-02-03 ENCOUNTER — DOCUMENTATION ONLY (OUTPATIENT)
Dept: CARE COORDINATION | Facility: CLINIC | Age: 50
End: 2020-02-03

## 2020-02-27 DIAGNOSIS — I25.10 CORONARY ARTERY DISEASE INVOLVING NATIVE CORONARY ARTERY OF NATIVE HEART WITHOUT ANGINA PECTORIS: ICD-10-CM

## 2020-02-27 DIAGNOSIS — I10 HYPERTENSION GOAL BP (BLOOD PRESSURE) < 140/90: ICD-10-CM

## 2020-02-27 LAB
ANION GAP SERPL CALCULATED.3IONS-SCNC: 4 MMOL/L (ref 3–14)
BUN SERPL-MCNC: 11 MG/DL (ref 7–30)
CALCIUM SERPL-MCNC: 9.3 MG/DL (ref 8.5–10.1)
CHLORIDE SERPL-SCNC: 106 MMOL/L (ref 94–109)
CHOLEST SERPL-MCNC: 108 MG/DL
CO2 SERPL-SCNC: 28 MMOL/L (ref 20–32)
CREAT SERPL-MCNC: 0.9 MG/DL (ref 0.66–1.25)
GFR SERPL CREATININE-BSD FRML MDRD: >90 ML/MIN/{1.73_M2}
GLUCOSE SERPL-MCNC: 99 MG/DL (ref 70–99)
HDLC SERPL-MCNC: 42 MG/DL
LDLC SERPL CALC-MCNC: 46 MG/DL
NONHDLC SERPL-MCNC: 66 MG/DL
POTASSIUM SERPL-SCNC: 3.9 MMOL/L (ref 3.4–5.3)
SODIUM SERPL-SCNC: 138 MMOL/L (ref 133–144)
TRIGL SERPL-MCNC: 98 MG/DL

## 2020-02-27 PROCEDURE — 36415 COLL VENOUS BLD VENIPUNCTURE: CPT | Performed by: INTERNAL MEDICINE

## 2020-02-27 PROCEDURE — 80061 LIPID PANEL: CPT | Performed by: INTERNAL MEDICINE

## 2020-02-27 PROCEDURE — 80048 BASIC METABOLIC PNL TOTAL CA: CPT | Performed by: INTERNAL MEDICINE

## 2020-02-27 NOTE — PATIENT INSTRUCTIONS
I recommend that patients 50 and over get the Shingrix vaccine at a pharmacy.  The pharmacist will let you know beforehand if there is a copay and can administer the vaccine.  The Shingrix vaccination is a 2-shot series.  The second dose is given 6 months after the first.  (It cannot be given sooner than 2 months after the first dose - at the earliest.)  You should be aware that patients are reporting feeling a bit under the weather after the injection, including fatigue, malaise, and low-grade fever that may last a couple days.  Rarely patients can get a mild, shingles-like rash.   But these symptoms are much better than the Shingles disease.      Preventive Health Recommendations  Male Ages 50 - 64    Yearly exam:             See your health care provider every year in order to  o   Review health changes.   o   Discuss preventive care.    o   Review your medicines if your doctor has prescribed any.     Have a cholesterol test every 5 years, or more frequently if you are at risk for high cholesterol/heart disease.     Have a diabetes test (fasting glucose) every three years. If you are at risk for diabetes, you should have this test more often.     Have a colonoscopy at age 50, or have a yearly FIT test (stool test). These exams will check for colon cancer.      Talk with your health care provider about whether or not a prostate cancer screening test (PSA) is right for you.    You should be tested each year for STDs (sexually transmitted diseases), if you re at risk.     Shots: Get a flu shot each year. Get a tetanus shot every 10 years.     Nutrition:    Eat at least 5 servings of fruits and vegetables daily.     Eat whole-grain bread, whole-wheat pasta and brown rice instead of white grains and rice.     Get adequate Calcium and Vitamin D.     Lifestyle    Exercise for at least 150 minutes a week (30 minutes a day, 5 days a week). This will help you control your weight and prevent disease.     Limit alcohol to  one drink per day.     No smoking.     Wear sunscreen to prevent skin cancer.     See your dentist every six months for an exam and cleaning.     See your eye doctor every 1 to 2 years.

## 2020-02-27 NOTE — PROGRESS NOTES
SUBJECTIVE:   CC: Lev Copeland is an 49 year old male who presents for preventative health visit.     Healthy Habits:     Getting at least 3 servings of Calcium per day:  Yes    Bi-annual eye exam:  Yes    Dental care twice a year:  Yes    Sleep apnea or symptoms of sleep apnea:  None    Diet:  Regular (no restrictions)    Frequency of exercise:  4-5 days/week    Duration of exercise:  30-45 minutes    Taking medications regularly:  Yes    Medication side effects:  Not applicable    PHQ-2 Total Score: 0    Additional concerns today:  No          Today's PHQ-2 Score:   PHQ-2 ( 1999 Pf[employer]) 3/3/2020   Q1: Little interest or pleasure in doing things 0   Q2: Feeling down, depressed or hopeless 0   PHQ-2 Score 0   Q1: Little interest or pleasure in doing things Not at all   Q2: Feeling down, depressed or hopeless Not at all   PHQ-2 Score 0       Abuse: Current or Past(Physical, Sexual or Emotional)- No  Do you feel safe in your environment? Yes        Social History     Tobacco Use     Smoking status: Never Smoker     Smokeless tobacco: Never Used   Substance Use Topics     Alcohol use: Yes     Comment: 4 Drinks per week         Alcohol Use 3/3/2020   Prescreen: >3 drinks/day or >7 drinks/week? No   Prescreen: >3 drinks/day or >7 drinks/week? -       Last PSA: No results found for: PSA    Reviewed orders with patient. Reviewed health maintenance and updated orders accordingly - Yes  BP Readings from Last 3 Encounters:   03/05/20 118/68   03/03/20 115/77   05/30/19 132/86    Wt Readings from Last 3 Encounters:   03/05/20 90.3 kg (199 lb)   03/03/20 90.3 kg (199 lb)   05/30/19 93.9 kg (207 lb)             Reviewed and updated as needed this visit by clinical staff  Tobacco  Allergies  Meds  Problems  Med Hx  Surg Hx  Fam Hx  Soc Hx          Reviewed and updated as needed this visit by Provider  Meds  Problems  Fam Hx        Past Medical History:   Diagnosis Date     Hypertension goal BP (blood pressure) <  "140/90      Isolated or specific phobia     performance     Major depressive disorder, single episode, mild (H)      Non-obstructive CAD without angina 3/12/2019     Prothrombin gene mutation (H)      Stuttering       Past Surgical History:   Procedure Laterality Date     COLONOSCOPY  Oct. 2018     NO HISTORY OF SURGERY         Review of Systems   Constitutional: Negative for chills and fever.   HENT: Negative for congestion, ear pain, hearing loss and sore throat.    Eyes: Negative for pain and visual disturbance.   Respiratory: Negative for cough and shortness of breath.    Cardiovascular: Negative for chest pain, palpitations and peripheral edema.   Gastrointestinal: Negative for abdominal pain, constipation, diarrhea, heartburn, hematochezia and nausea.   Genitourinary: Negative for discharge, dysuria, frequency, genital sores, hematuria, impotence and urgency.   Musculoskeletal: Negative for arthralgias, joint swelling and myalgias.   Skin: Negative for rash.   Neurological: Negative for dizziness, weakness, headaches and paresthesias.   Psychiatric/Behavioral: Negative for mood changes. The patient is not nervous/anxious.          OBJECTIVE:   /68 (BP Location: Right arm, Patient Position: Sitting, Cuff Size: Adult Regular)   Pulse 80   Temp 97.5  F (36.4  C) (Oral)   Resp 14   Ht 1.778 m (5' 10\")   Wt 90.3 kg (199 lb)   SpO2 99%   BMI 28.55 kg/m      Physical Exam  GENERAL: healthy, alert and no distress  EYES: Eyes grossly normal to inspection, conjunctivae and sclerae normal  HENT: ear canals and TM's normal, nose and mouth without ulcers or lesions  NECK: no adenopathy, no asymmetry, masses, or scars and thyroid normal to palpation  RESP: lungs clear to auscultation - no rales, rhonchi or wheezes  CV: regular rate and rhythm, normal S1 S2, no S3 or S4, no murmur, click or rub, no peripheral edema and peripheral pulses strong  ABDOMEN: soft, nontender, no hepatosplenomegaly, no masses   MS: no " "gross musculoskeletal defects noted, no edema  SKIN: no suspicious lesions or rashes  NEURO: Normal strength and tone, mentation intact and speech normal  PSYCH: mentation appears normal, affect normal/bright    Diagnostic Test Results:  Labs reviewed in Epic    ASSESSMENT/PLAN:       ICD-10-CM    1. Routine general medical examination at a health care facility Z00.00    2. Isolated or specific phobia F40.298 LORazepam (ATIVAN) 0.5 MG tablet   3. Anxiety F41.9 LORazepam (ATIVAN) 0.5 MG tablet     metoprolol succinate ER (TOPROL-XL) 50 MG 24 hr tablet   4. Hypertension goal BP (blood pressure) < 140/90 I10 lisinopril-hydrochlorothiazide (ZESTORETIC) 20-25 MG tablet     metoprolol succinate ER (TOPROL-XL) 50 MG 24 hr tablet   5. Prothrombin gene mutation (H) D68.52    6. Need for vaccination Z23 TDAP, IM (10 - 64 YRS) - Adacel       COUNSELING:   Reviewed preventive health counseling, as reflected in patient instructions    Estimated body mass index is 28.55 kg/m  as calculated from the following:    Height as of this encounter: 1.778 m (5' 10\").    Weight as of this encounter: 90.3 kg (199 lb).  Weight management plan: Discussed healthy diet and exercise guidelines      Counseling Resources:  ATP IV Guidelines  Pooled Cohorts Equation Calculator  FRAX Risk Assessment  ICSI Preventive Guidelines  Dietary Guidelines for Americans, 2010  USDA's MyPlate  ASA Prophylaxis  Lung CA Screening    Joaquina Goldman MD  [facility]  "

## 2020-03-03 ENCOUNTER — OFFICE VISIT (OUTPATIENT)
Dept: CARDIOLOGY | Facility: CLINIC | Age: 50
End: 2020-03-03
Attending: INTERNAL MEDICINE
Payer: COMMERCIAL

## 2020-03-03 VITALS
HEART RATE: 59 BPM | HEIGHT: 70 IN | WEIGHT: 199 LBS | SYSTOLIC BLOOD PRESSURE: 115 MMHG | BODY MASS INDEX: 28.49 KG/M2 | DIASTOLIC BLOOD PRESSURE: 77 MMHG | OXYGEN SATURATION: 96 %

## 2020-03-03 DIAGNOSIS — I10 HYPERTENSION GOAL BP (BLOOD PRESSURE) < 140/90: ICD-10-CM

## 2020-03-03 DIAGNOSIS — R07.9 CHEST PAIN, UNSPECIFIED TYPE: ICD-10-CM

## 2020-03-03 DIAGNOSIS — I25.10 CORONARY ARTERY DISEASE INVOLVING NATIVE CORONARY ARTERY OF NATIVE HEART WITHOUT ANGINA PECTORIS: Primary | ICD-10-CM

## 2020-03-03 PROCEDURE — G0463 HOSPITAL OUTPT CLINIC VISIT: HCPCS

## 2020-03-03 PROCEDURE — 99214 OFFICE O/P EST MOD 30 MIN: CPT | Mod: ZP | Performed by: INTERNAL MEDICINE

## 2020-03-03 RX ORDER — ATORVASTATIN CALCIUM 40 MG/1
40 TABLET, FILM COATED ORAL DAILY
Qty: 90 TABLET | Refills: 3 | Status: SHIPPED | OUTPATIENT
Start: 2020-03-03 | End: 2021-03-03

## 2020-03-03 ASSESSMENT — PAIN SCALES - GENERAL: PAINLEVEL: NO PAIN (0)

## 2020-03-03 ASSESSMENT — ENCOUNTER SYMPTOMS
HEADACHES: 0
ABDOMINAL PAIN: 0
DYSURIA: 0
WEAKNESS: 0
FEVER: 0
HEMATOCHEZIA: 0
NERVOUS/ANXIOUS: 0
ARTHRALGIAS: 0
DIARRHEA: 0
FREQUENCY: 0
CHILLS: 0
PARESTHESIAS: 0
CONSTIPATION: 0
PALPITATIONS: 0
DIZZINESS: 0
EYE PAIN: 0
COUGH: 0
HEMATURIA: 0
JOINT SWELLING: 0
SHORTNESS OF BREATH: 0
NAUSEA: 0
SORE THROAT: 0
HEARTBURN: 0
MYALGIAS: 0

## 2020-03-03 ASSESSMENT — MIFFLIN-ST. JEOR: SCORE: 1763.91

## 2020-03-03 NOTE — NURSING NOTE
Chief Complaint   Patient presents with     Follow Up     1 year follow up      Vitals were taken and medications were reconciled.     Olivia Perry RMA  4:20 PM

## 2020-03-03 NOTE — PROGRESS NOTES
Chief complaint: Follow up of HTN, CAD    HPI:   Lev Copeland is a 49 year old male with history of nonobstructive CAD (coronary CTA 2016, 8/2018), longstanding HTN, and family history of CAD who presents for follow up of his chronic cardiovascular conditions.     Cardiac History:  He was seen by me 8/28/18 after an ED visit 8/14/18 for atypical chest pain. Exercise ECG was done and showed no ischemia, but in light of his higher pretest probability (known CAD based on prior coronary CTA 2016), Coronary CTA was done 8/29/18 and showed minimal (<25%) coronary atherosclerosis involving the proximal LAD and Agatston calcium score of 11.7 (all in the LAD), with no significant change from the prior study dated 2/5/16.     Last visit 3/12/19:  Since that time, he has continued exercising regularly (walking and running on the treadmill ~5x/week) without chest pain or dyspnea; his exertional capacity has actually increased-- he has run two 5Ks since his last visit.  He is taking flax seed oil and red yeast rice, and is wondering if he should continue taking these.   Flax oil and red yeast rice were subsequently stopped.    3/3/20:  He recently joined a gym and started weight training 2x/week. He continues to exercise on the treadmill 3x/week without chest pain or dyspnea.     His father had several DVTs and was found to have prothrombin gene mutation; Lev was also tested and found to be positive for it.    He denies any dyspnea at rest or with exertion, PND, orthopnea, peripheral edema, palpitations, lightheadedness or syncope.       PAST MEDICAL HISTORY:  Past Medical History:   Diagnosis Date     Hypertension goal BP (blood pressure) < 140/90      Isolated or specific phobia     performance     Major depressive disorder, single episode, mild (H)      Non-obstructive CAD without angina 3/12/2019     Prothrombin gene mutation (H)      Stuttering        CURRENT MEDICATIONS:  Current Outpatient Medications   Medication  "Sig Dispense Refill     aspirin 81 MG tablet Take 1 tablet (81 mg) by mouth daily 90 tablet 3     atorvastatin (LIPITOR) 40 MG tablet Take 1 tablet (40 mg) by mouth daily 90 tablet 1     lisinopril-hydrochlorothiazide (PRINZIDE/ZESTORETIC) 20-25 MG tablet Take 1 tablet by mouth every morning 90 tablet 0     LORazepam (ATIVAN) 0.5 MG tablet Take 1 tablet (0.5 mg) by mouth 2 times daily as needed for anxiety 60 tablet 0     metoprolol succinate ER (TOPROL-XL) 50 MG 24 hr tablet Take 1 tablet (50 mg) by mouth daily 90 tablet 2       PAST SURGICAL HISTORY:  Past Surgical History:   Procedure Laterality Date     COLONOSCOPY  Oct. 2018     NO HISTORY OF SURGERY         ALLERGIES:   No Known Allergies    FAMILY HISTORY:  Family History   Problem Relation Age of Onset     Heart Disease Father 63        CAB x4 at age 63     Genetic Disease Father      Hypertension Sister    No family history of premature CAD or sudden death.    SOCIAL HISTORY:  Social History     Tobacco Use     Smoking status: Never Smoker     Smokeless tobacco: Never Used   Substance Use Topics     Alcohol use: Yes     Comment: 4 Drinks per week     Drug use: No       ROS:   A comprehensive 14 point review of systems is negative other than as mentioned in HPI.    Exam:  /77 (BP Location: Right arm, Patient Position: Chair, Cuff Size: Adult Regular)   Pulse 59   Ht 1.778 m (5' 10\")   Wt 90.3 kg (199 lb)   SpO2 96%   BMI 28.55 kg/m    GENERAL APPEARANCE: healthy, alert and no distress  EYES: no icterus, no xanthelasmas  ENT: normal palate, mucosa moist, no central cyanosis  NECK: no adenopathy, no asymmetry, masses, or scars, thyroid normal to palpation and no bruits, JVP not elevated  RESPIRATORY: lungs clear to auscultation - no rales, rhonchi or wheezes, no use of accessory muscles, no retractions, respirations are unlabored, normal respiratory rate  CARDIOVASCULAR: regular rhythm, normal S1 with physiologic split S2, no S3 or S4 and no murmur, " click or rub, precordium quiet with normal PMI.  GI: soft, non tender, without hepatosplenomegaly, no masses palpable, bowel sounds normal, aorta not enlarged by palpation, no abdominal bruits  EXTREMITIES: peripheral pulses normal, no edema, no bruits  NEURO: alert and oriented to person/place/time, normal speech, gait and affect  VASC: Radial, dorsalis pedis and posterior tibialis pulses 2+ bilaterally.  SKIN: no ecchymoses, no rashes.  PSYCH: cooperative, affect appropriate.     Labs:  Reviewed. Of note:  Lipids 3/27/18: Chol 178 HDL 43 LDL 93    Lipids 2/26/19: Chol 104 HDL 43 LDL 40   Lipids 2/27/20: Chol 108 HDL 42 LDL 46 TG 98    Testing/Procedures:  Coronary CTA 8/2018: minimal (<25%) stenosis involving the proximal and mid-LAD with mixed plaque.    Coronary CTA 2016: minimal (<25%) stenosis involving the proximal and mid-LAD with mixed plaque.    Exercise ECG 8/23/18:  Baseline ECG sinus, VR 63, no ST/T abnormalities or Q-waves.   Exercised 12:53, 13 METS, max  (96% MPHR.)   No angina elicited. No ECG evidence of ischemia at a diagnostic level of myocardial O2 demand.        Assessment and Plan:   1. CAD (with known non-obstructive coronary atherosclerosis on coronary CTA 2016)  2. Atypical chest pain, resolved  Stably free of anginal symptoms and with optimal lipids and excellent exercise capacity. Continue ASA 81 mg daily, atorvastatin 40 mg daily, Toprol XL 50 mg daily.     3. Essential HTN, improved:  Continue present medications.    The patient states understanding and is agreeable with plan.     Zackery Cardozo MD  Cardiology    CC  [facility]

## 2020-03-03 NOTE — PATIENT INSTRUCTIONS
You were seen at the [facility] clinic today.  You saw Dr. Zackery Cardozo  Here are your Instructions:    1. Follow up with Dr. Cardozo in 1 year with fasting lab work (cholesterol test) prior.        If you have questions after this visit:  Send a [employer] message or contact Ivett Dominguez LPN  Office:  [phone] option #1, then #4 & ask for Ivett (nurse line)  Fax:  [phone]  After Hours:  [phone] option #4 ask to speak to he on-call Cardiologist  Appointments:  [phone] option #1, then option #1

## 2020-03-03 NOTE — LETTER
3/3/2020      RE: Lev Copeland  [address] [place] [address]     Dear Colleague,    Thank you for the opportunity to participate in the care of your patient, Lev Copeland, at the [facility]. Please see a copy of my visit note below.    Chief complaint: Follow up of HTN, CAD    HPI:   Lev Copeland is a 49 year old male with history of nonobstructive CAD (coronary CTA 2016, 8/2018), longstanding HTN, and family history of CAD who presents for follow up of his chronic cardiovascular conditions.     Cardiac History:  He was seen by me 8/28/18 after an ED visit 8/14/18 for atypical chest pain. Exercise ECG was done and showed no ischemia, but in light of his higher pretest probability (known CAD based on prior coronary CTA 2016), Coronary CTA was done 8/29/18 and showed minimal (<25%) coronary atherosclerosis involving the proximal LAD and Agatston calcium score of 11.7 (all in the LAD), with no significant change from the prior study dated 2/5/16.     Last visit 3/12/19:  Since that time, he has continued exercising regularly (walking and running on the treadmill ~5x/week) without chest pain or dyspnea; his exertional capacity has actually increased-- he has run two 5Ks since his last visit.  He is taking flax seed oil and red yeast rice, and is wondering if he should continue taking these.   Flax oil and red yeast rice were subsequently stopped.    3/3/20:  He recently joined a gym and started weight training 2x/week. He continues to exercise on the treadmill 3x/week without chest pain or dyspnea.     His father had several DVTs and was found to have prothrombin gene mutation; Lev was also tested and found to be positive for it.    He denies any dyspnea at rest or with exertion, PND, orthopnea, peripheral edema, palpitations, lightheadedness or syncope.       PAST MEDICAL HISTORY:  Past Medical History:   Diagnosis Date     Hypertension  "goal BP (blood pressure) < 140/90      Isolated or specific phobia     performance     Major depressive disorder, single episode, mild (H)      Non-obstructive CAD without angina 3/12/2019     Prothrombin gene mutation (H)      Stuttering        CURRENT MEDICATIONS:  Current Outpatient Medications   Medication Sig Dispense Refill     aspirin 81 MG tablet Take 1 tablet (81 mg) by mouth daily 90 tablet 3     atorvastatin (LIPITOR) 40 MG tablet Take 1 tablet (40 mg) by mouth daily 90 tablet 1     lisinopril-hydrochlorothiazide (PRINZIDE/ZESTORETIC) 20-25 MG tablet Take 1 tablet by mouth every morning 90 tablet 0     LORazepam (ATIVAN) 0.5 MG tablet Take 1 tablet (0.5 mg) by mouth 2 times daily as needed for anxiety 60 tablet 0     metoprolol succinate ER (TOPROL-XL) 50 MG 24 hr tablet Take 1 tablet (50 mg) by mouth daily 90 tablet 2       PAST SURGICAL HISTORY:  Past Surgical History:   Procedure Laterality Date     COLONOSCOPY  Oct. 2018     NO HISTORY OF SURGERY         ALLERGIES:   No Known Allergies    FAMILY HISTORY:  Family History   Problem Relation Age of Onset     Heart Disease Father 63        CAB x4 at age 63     Genetic Disease Father      Hypertension Sister    No family history of premature CAD or sudden death.    SOCIAL HISTORY:  Social History     Tobacco Use     Smoking status: Never Smoker     Smokeless tobacco: Never Used   Substance Use Topics     Alcohol use: Yes     Comment: 4 Drinks per week     Drug use: No     ROS:   A comprehensive 14 point review of systems is negative other than as mentioned in HPI.    Exam:  /77 (BP Location: Right arm, Patient Position: Chair, Cuff Size: Adult Regular)   Pulse 59   Ht 1.778 m (5' 10\")   Wt 90.3 kg (199 lb)   SpO2 96%   BMI 28.55 kg/m     GENERAL APPEARANCE: healthy, alert and no distress  EYES: no icterus, no xanthelasmas  ENT: normal palate, mucosa moist, no central cyanosis  NECK: no adenopathy, no asymmetry, masses, or scars, thyroid normal " to palpation and no bruits, JVP not elevated  RESPIRATORY: lungs clear to auscultation - no rales, rhonchi or wheezes, no use of accessory muscles, no retractions, respirations are unlabored, normal respiratory rate  CARDIOVASCULAR: regular rhythm, normal S1 with physiologic split S2, no S3 or S4 and no murmur, click or rub, precordium quiet with normal PMI.  GI: soft, non tender, without hepatosplenomegaly, no masses palpable, bowel sounds normal, aorta not enlarged by palpation, no abdominal bruits  EXTREMITIES: peripheral pulses normal, no edema, no bruits  NEURO: alert and oriented to person/place/time, normal speech, gait and affect  VASC: Radial, dorsalis pedis and posterior tibialis pulses 2+ bilaterally.  SKIN: no ecchymoses, no rashes.  PSYCH: cooperative, affect appropriate.     Labs:  Reviewed. Of note:  Lipids 3/27/18: Chol 178 HDL 43 LDL 93    Lipids 2/26/19: Chol 104 HDL 43 LDL 40   Lipids 2/27/20: Chol 108 HDL 42 LDL 46 TG 98    Testing/Procedures:  Coronary CTA 8/2018: minimal (<25%) stenosis involving the proximal and mid-LAD with mixed plaque.    Coronary CTA 2016: minimal (<25%) stenosis involving the proximal and mid-LAD with mixed plaque.    Exercise ECG 8/23/18:  Baseline ECG sinus, VR 63, no ST/T abnormalities or Q-waves.   Exercised 12:53, 13 METS, max  (96% MPHR.)   No angina elicited. No ECG evidence of ischemia at a diagnostic level of myocardial O2 demand.        Assessment and Plan:   1. CAD (with known non-obstructive coronary atherosclerosis on coronary CTA 2016)  2. Atypical chest pain, resolved  Stably free of anginal symptoms and with optimal lipids and excellent exercise capacity. Continue ASA 81 mg daily, atorvastatin 40 mg daily, Toprol XL 50 mg daily.     3. Essential HTN, improved:  Continue present medications.    The patient states understanding and is agreeable with plan.     Zackery Cardozo MD  Cardiology    CC  [facility]IL[facility]LLY

## 2020-03-05 ENCOUNTER — OFFICE VISIT (OUTPATIENT)
Dept: FAMILY MEDICINE | Facility: CLINIC | Age: 50
End: 2020-03-05
Payer: COMMERCIAL

## 2020-03-05 VITALS
RESPIRATION RATE: 14 BRPM | WEIGHT: 199 LBS | HEART RATE: 80 BPM | HEIGHT: 70 IN | TEMPERATURE: 97.5 F | DIASTOLIC BLOOD PRESSURE: 68 MMHG | BODY MASS INDEX: 28.49 KG/M2 | OXYGEN SATURATION: 99 % | SYSTOLIC BLOOD PRESSURE: 118 MMHG

## 2020-03-05 DIAGNOSIS — Z23 NEED FOR VACCINATION: ICD-10-CM

## 2020-03-05 DIAGNOSIS — F41.9 ANXIETY: ICD-10-CM

## 2020-03-05 DIAGNOSIS — I10 HYPERTENSION GOAL BP (BLOOD PRESSURE) < 140/90: ICD-10-CM

## 2020-03-05 DIAGNOSIS — Z00.00 ROUTINE GENERAL MEDICAL EXAMINATION AT A HEALTH CARE FACILITY: Primary | ICD-10-CM

## 2020-03-05 DIAGNOSIS — F40.298 ISOLATED OR SPECIFIC PHOBIA: ICD-10-CM

## 2020-03-05 DIAGNOSIS — D68.52 PROTHROMBIN GENE MUTATION (H): ICD-10-CM

## 2020-03-05 PROCEDURE — 90471 IMMUNIZATION ADMIN: CPT | Performed by: FAMILY MEDICINE

## 2020-03-05 PROCEDURE — 99396 PREV VISIT EST AGE 40-64: CPT | Mod: 25 | Performed by: FAMILY MEDICINE

## 2020-03-05 PROCEDURE — 90715 TDAP VACCINE 7 YRS/> IM: CPT | Performed by: FAMILY MEDICINE

## 2020-03-05 RX ORDER — METOPROLOL SUCCINATE 50 MG/1
50 TABLET, EXTENDED RELEASE ORAL DAILY
Qty: 90 TABLET | Refills: 3 | Status: SHIPPED | OUTPATIENT
Start: 2020-03-05 | End: 2021-03-03

## 2020-03-05 RX ORDER — LORAZEPAM 0.5 MG/1
0.5 TABLET ORAL 2 TIMES DAILY PRN
Qty: 60 TABLET | Refills: 0 | Status: SHIPPED | OUTPATIENT
Start: 2020-03-05 | End: 2021-03-03

## 2020-03-05 RX ORDER — LISINOPRIL AND HYDROCHLOROTHIAZIDE 20; 25 MG/1; MG/1
1 TABLET ORAL EVERY MORNING
Qty: 90 TABLET | Refills: 3 | Status: SHIPPED | OUTPATIENT
Start: 2020-03-05 | End: 2021-03-03

## 2020-03-05 ASSESSMENT — ENCOUNTER SYMPTOMS
ABDOMINAL PAIN: 0
CHILLS: 0
FREQUENCY: 0
PARESTHESIAS: 0
FEVER: 0
DYSURIA: 0
NAUSEA: 0
CONSTIPATION: 0
JOINT SWELLING: 0
HEADACHES: 0
SHORTNESS OF BREATH: 0
SORE THROAT: 0
PALPITATIONS: 0
WEAKNESS: 0
HEMATURIA: 0
COUGH: 0
NERVOUS/ANXIOUS: 0
HEARTBURN: 0
HEMATOCHEZIA: 0
DIZZINESS: 0
EYE PAIN: 0
MYALGIAS: 0
ARTHRALGIAS: 0
DIARRHEA: 0

## 2020-03-05 ASSESSMENT — MIFFLIN-ST. JEOR: SCORE: 1763.91

## 2020-03-05 NOTE — NURSING NOTE
Clinic Administered Medication Documentation      Injectable Medication Documentation    Patient was given TDAP. Prior to medication administration, verified patients identity using patient s name and date of birth. Please see MAR and medication order for additional information. Patient instructed to remain in clinic for 15 minutes and report any adverse reaction to staff immediately .      Was entire vial of medication used? Yes  Vial/Syringe: Syringe  Expiration Date:  04/12/2022  Was this medication supplied by the patient? No     Prior to immunization administration, verified patients identity using patient s name and date of birth. Please see Immunization Activity for additional information.     Screening Questionnaire for Adult Immunization    Are you sick today?   No   Do you have allergies to medications, food, a vaccine component or latex?   No   Have you ever had a serious reaction after receiving a vaccination?   No   Do you have a long-term health problem with heart, lung, kidney, or metabolic disease (e.g., diabetes), asthma, a blood disorder, no spleen, complement component deficiency, a cochlear implant, or a spinal fluid leak?  Are you on long-term aspirin therapy?   No   Do you have cancer, leukemia, HIV/AIDS, or any other immune system problem?   No   Do you have a parent, brother, or sister with an immune system problem?   No   In the past 3 months, have you taken medications that affect  your immune system, such as prednisone, other steroids, or anticancer drugs; drugs for the treatment of rheumatoid arthritis, Crohn s disease, or psoriasis; or have you had radiation treatments?   No   Have you had a seizure, or a brain or other nervous system problem?   No   During the past year, have you received a transfusion of blood or blood    products, or been given immune (gamma) globulin or antiviral drug?   No   For women: Are you pregnant or is there a chance you could become       pregnant during the  next month?   No   Have you received any vaccinations in the past 4 weeks?   No     Immunization questionnaire answers were all negative.        Per orders of Dr. Goldman, injection of TDAP given by Josefina Greene MA. Patient instructed to remain in clinic for 15 minutes afterwards, and to report any adverse reaction to me immediately.       Screening performed by Josefina Greene MA on 3/5/2020 at 8:56 AM.

## 2020-08-24 ENCOUNTER — OFFICE VISIT (OUTPATIENT)
Dept: URGENT CARE | Facility: URGENT CARE | Age: 50
End: 2020-08-24
Payer: COMMERCIAL

## 2020-08-24 VITALS
SYSTOLIC BLOOD PRESSURE: 110 MMHG | HEART RATE: 60 BPM | DIASTOLIC BLOOD PRESSURE: 66 MMHG | BODY MASS INDEX: 27.92 KG/M2 | HEIGHT: 70 IN | WEIGHT: 195 LBS | TEMPERATURE: 98 F | RESPIRATION RATE: 12 BRPM

## 2020-08-24 DIAGNOSIS — S09.90XA CLOSED HEAD INJURY, INITIAL ENCOUNTER: Primary | ICD-10-CM

## 2020-08-24 PROCEDURE — 99214 OFFICE O/P EST MOD 30 MIN: CPT | Performed by: INTERNAL MEDICINE

## 2020-08-24 ASSESSMENT — ENCOUNTER SYMPTOMS
NAUSEA: 1
VOMITING: 0
CONFUSION: 0
NECK PAIN: 0
RHINORRHEA: 0
LIGHT-HEADEDNESS: 1
ACTIVITY CHANGE: 0
WOUND: 1
BRUISES/BLEEDS EASILY: 0
SHORTNESS OF BREATH: 0
DECREASED CONCENTRATION: 0
HEADACHES: 1
FATIGUE: 0
SLEEP DISTURBANCE: 0
APPETITE CHANGE: 0
SPEECH DIFFICULTY: 0

## 2020-08-24 ASSESSMENT — MIFFLIN-ST. JEOR: SCORE: 1745.76

## 2020-08-24 NOTE — PROGRESS NOTES
SUBJECTIVE:   Lev Copeland is a 49 year old male presenting with a chief complaint of   Chief Complaint   Patient presents with     Urgent Care     Head Injury     banged head on corner of counter Friday evening, hit top of left side. No loc but worried about concussion.        He is an established patient of [facility].    Head Injury    Onset of symptoms was 3 day(s) ago.  Mechanism of Injury: Hit head while at home.  In kitchen, hit head on corner of cabinet in kitchen.  Whiling standing.  No fall  Bled initially  Next few days, foggy at times  Occasional nausea  Feels better today  Loss of consciousness: No  Course of illness is improving.      Treatment measures tried include: None      Refer to [employer]        Review of Systems   Constitutional: Negative for activity change (taking it easy), appetite change and fatigue.   HENT: Negative for ear pain, hearing loss, rhinorrhea and tinnitus.    Eyes: Negative for visual disturbance.   Respiratory: Negative for shortness of breath.    Cardiovascular: Negative for chest pain.   Gastrointestinal: Positive for nausea (better). Negative for vomiting.   Genitourinary: Positive for decreased urine volume.   Musculoskeletal: Negative for neck pain.   Skin: Positive for wound (with bump).   Neurological: Positive for light-headedness and headaches (mild cloudy head ha, back of top & head.  this is better). Negative for speech difficulty.   Hematological: Does not bruise/bleed easily (on aspirin).   Psychiatric/Behavioral: Negative for behavioral problems, confusion, decreased concentration and sleep disturbance.       Past Medical History:   Diagnosis Date     Hypertension goal BP (blood pressure) < 140/90      Isolated or specific phobia     performance     Major depressive disorder, single episode, mild (H)      Non-obstructive CAD without angina 3/12/2019     Prothrombin gene mutation (H)      Stuttering      Family History   Problem Relation Age of Onset  "    Skin Cancer Mother      Heart Disease Father 63        CAB x4 at age 63     Genetic Disease Father      Hypertension Sister      Current Outpatient Medications   Medication Sig Dispense Refill     aspirin 81 MG tablet Take 1 tablet (81 mg) by mouth daily 90 tablet 3     atorvastatin (LIPITOR) 40 MG tablet Take 1 tablet (40 mg) by mouth daily 90 tablet 3     lisinopril-hydrochlorothiazide (ZESTORETIC) 20-25 MG tablet Take 1 tablet by mouth every morning 90 tablet 3     metoprolol succinate ER (TOPROL-XL) 50 MG 24 hr tablet Take 1 tablet (50 mg) by mouth daily 90 tablet 3     LORazepam (ATIVAN) 0.5 MG tablet Take 1 tablet (0.5 mg) by mouth 2 times daily as needed for anxiety 60 tablet 0     Social History     Tobacco Use     Smoking status: Never Smoker     Smokeless tobacco: Never Used   Substance Use Topics     Alcohol use: Yes     Comment: 4 Drinks per week       OBJECTIVE  /66   Pulse 60   Temp 98  F (36.7  C) (Oral)   Resp 12   Ht 1.778 m (5' 10\")   Wt 88.5 kg (195 lb)   BMI 27.98 kg/m      Physical Exam  Vitals signs reviewed.   Constitutional:       Appearance: Normal appearance.   HENT:      Right Ear: Tympanic membrane normal.      Left Ear: Tympanic membrane normal.      Ears:      Comments: normal finger rub bilateral      Nose: Nose normal.      Mouth/Throat:      Mouth: Mucous membranes are moist.   Eyes:      Extraocular Movements: Extraocular movements intact.      Conjunctiva/sclera: Conjunctivae normal.      Pupils: Pupils are equal, round, and reactive to light.   Neck:      Musculoskeletal: Neck supple.   Cardiovascular:      Rate and Rhythm: Normal rate and regular rhythm.      Pulses: Normal pulses.      Heart sounds: Normal heart sounds.   Pulmonary:      Effort: Pulmonary effort is normal.      Breath sounds: Normal breath sounds.   Musculoskeletal:      Comments: nontender palpation of head & neck   Skin:     Comments: Healing abrasion top of head   Neurological:      General: " No focal deficit present.      Mental Status: He is alert and oriented to person, place, and time.      Cranial Nerves: No cranial nerve deficit.      Sensory: No sensory deficit.      Motor: No weakness.      Coordination: Coordination normal.      Gait: Gait normal.   Psychiatric:         Mood and Affect: Mood normal.         Behavior: Behavior normal.         Thought Content: Thought content normal.         Judgment: Judgment normal.         Labs:  No results found for this or any previous visit (from the past 24 hour(s)).        ASSESSMENT:      ICD-10-CM    1. Closed head injury, initial encounter  S09.90XA         Medical Decision Making:  Initially patient had symptoms of headache dizziness and nausea which subsequently have resolved.  He feels better today.  Discussed he may have had a mild concussion symptoms but as he is improved he will not need any further treatment at this time.  Recommend fluids.  Rest.   No have the activities for 1 to 2 weeks and recommend reevaluation with primary if symptoms return.  Discussed phone/video visit would be appropriate since I already performed exam today      Patient Instructions   Fluids  Rest.  No heavy activities for 1-2 weeks    Neuro exam normal     Currently symptoms resolved   But if returns and persists then need recheck with primary   With consideration concussion clinic    Watch for symptoms of concern below.          Patient Education     Head Injury with Sleep Monitoring (Adult)    You have a head injury. It does not appear serious at this time. But symptoms of a more serious problem, such as mild brain injury (concussion), or bruising or bleeding in the brain, may appear later. For this reason, you and someone caring for you will need to watch for the symptoms listed below. Once at home, also be sure to follow any care instructions you re given.  Home care  Watch for the following symptoms  Someone must stay with you for the next 24 hours (or longer, if  directed). If you fall asleep, this person should wake you up every 2 hours, or as directed, to check your symptoms. This is called sleep monitoring. Symptoms to watch for include:    Headache    Nausea or vomiting    Dizziness    Sensitivity to light or noise    Unusual sleepiness or grogginess    Trouble falling asleep    Personality changes    Vision changes    Memory loss    Confusion    Trouble walking or clumsiness    Loss of consciousness (even for a short time)    Inability to be awakened    Stiff neck    Weakness or numbness in any part of the body    Seizures    Bruising behind the ears or around the eyes  If you develop any of these symptoms, seek emergency medical care right away. If none of these symptoms are noted during the first 24 hours, keep watching for symptoms for the next day or so. Ask your provider if someone should stay with you during this time.   General care    If you were prescribed medicines for pain, use them as directed. Don t use other pain medicines without checking with your provider first.    To help reduce swelling and pain, apply a cold source to the injured area for up to 20 minutes at a time. Do this as often as directed. Use a cold pack or bag of ice wrapped in a thin towel. Never apply a cold source directly to the skin.    If you have cuts or scrapes as a result of your injury, care for them as directed.    For the next 24 hours (or longer, if instructed):  ? Don t drink alcohol or use sedatives or other medicines that make you sleepy.  ? Don t drive or operate machinery.  ? Don t do anything strenuous, such as heavy lifting or straining.  ? Limit tasks that require concentration. This includes reading, using a smartphone or computer, watching TV, and playing video games.  ? Don t return to sports or other activity that could result in another head injury.  Follow-up care  Follow up with your healthcare provider, or as directed. If imaging tests were done, they will be  reviewed by a doctor. You will be told the results and any new findings that may affect your care.  When to seek medical advice  Call your healthcare provider right away if any of these occur:    Pain doesn t get better or worsens    New or increased swelling or bruising    Fever of 100.4 F (38 C) or higher, or as directed by your provider    Redness, warmth, bleeding, or drainage from the injured area    Any depression or bony abnormality in the injured area    Fluid drainage or bleeding from the nose or ears    Bruising behind the ears or around the eyes    Lethargy or excessive sleepiness  Date Last Reviewed: 4/1/2018 2000-2019 [employer]. [address] [place] [address], [place] [id number]. All rights reserved. This information is not intended as a substitute for professional medical care. Always follow your healthcare professional's instructions.

## 2020-08-24 NOTE — PATIENT INSTRUCTIONS
Fluids  Rest.  No heavy activities for 1-2 weeks    Neuro exam normal     Currently symptoms resolved   But if returns and persists then need recheck with primary   With consideration concussion clinic    Watch for symptoms of concern below.          Patient Education     Head Injury with Sleep Monitoring (Adult)    You have a head injury. It does not appear serious at this time. But symptoms of a more serious problem, such as mild brain injury (concussion), or bruising or bleeding in the brain, may appear later. For this reason, you and someone caring for you will need to watch for the symptoms listed below. Once at home, also be sure to follow any care instructions you re given.  Home care  Watch for the following symptoms  Someone must stay with you for the next 24 hours (or longer, if directed). If you fall asleep, this person should wake you up every 2 hours, or as directed, to check your symptoms. This is called sleep monitoring. Symptoms to watch for include:    Headache    Nausea or vomiting    Dizziness    Sensitivity to light or noise    Unusual sleepiness or grogginess    Trouble falling asleep    Personality changes    Vision changes    Memory loss    Confusion    Trouble walking or clumsiness    Loss of consciousness (even for a short time)    Inability to be awakened    Stiff neck    Weakness or numbness in any part of the body    Seizures    Bruising behind the ears or around the eyes  If you develop any of these symptoms, seek emergency medical care right away. If none of these symptoms are noted during the first 24 hours, keep watching for symptoms for the next day or so. Ask your provider if someone should stay with you during this time.   General care    If you were prescribed medicines for pain, use them as directed. Don t use other pain medicines without checking with your provider first.    To help reduce swelling and pain, apply a cold source to the injured area for up to 20 minutes at a time.  Do this as often as directed. Use a cold pack or bag of ice wrapped in a thin towel. Never apply a cold source directly to the skin.    If you have cuts or scrapes as a result of your injury, care for them as directed.    For the next 24 hours (or longer, if instructed):  ? Don t drink alcohol or use sedatives or other medicines that make you sleepy.  ? Don t drive or operate machinery.  ? Don t do anything strenuous, such as heavy lifting or straining.  ? Limit tasks that require concentration. This includes reading, using a smartphone or computer, watching TV, and playing video games.  ? Don t return to sports or other activity that could result in another head injury.  Follow-up care  Follow up with your healthcare provider, or as directed. If imaging tests were done, they will be reviewed by a doctor. You will be told the results and any new findings that may affect your care.  When to seek medical advice  Call your healthcare provider right away if any of these occur:    Pain doesn t get better or worsens    New or increased swelling or bruising    Fever of 100.4 F (38 C) or higher, or as directed by your provider    Redness, warmth, bleeding, or drainage from the injured area    Any depression or bony abnormality in the injured area    Fluid drainage or bleeding from the nose or ears    Bruising behind the ears or around the eyes    Lethargy or excessive sleepiness  Date Last Reviewed: 4/1/2018 2000-2019 [employer]. [address] [place] [id number]. All rights reserved. This information is not intended as a substitute for professional medical care. Always follow your healthcare professional's instructions.

## 2020-10-08 ENCOUNTER — VIRTUAL VISIT (OUTPATIENT)
Dept: FAMILY MEDICINE | Facility: CLINIC | Age: 50
End: 2020-10-08
Payer: COMMERCIAL

## 2020-10-08 DIAGNOSIS — T63.441A ACCIDENTAL BEE STING: Primary | ICD-10-CM

## 2020-10-08 PROCEDURE — 99213 OFFICE O/P EST LOW 20 MIN: CPT | Mod: 95 | Performed by: FAMILY MEDICINE

## 2020-10-08 NOTE — PROGRESS NOTES
"Lev Copeland is a 49 year old male who is being evaluated via a billable video visit.      The patient has been notified of following:     \"This video visit will be conducted via a call between you and your physician/provider. We have found that certain health care needs can be provided without the need for an in-person physical exam.  This service lets us provide the care you need with a video conversation.  If a prescription is necessary we can send it directly to your pharmacy.  If lab work is needed we can place an order for that and you can then stop by our lab to have the test done at a later time.    Video visits are billed at different rates depending on your insurance coverage.  Please reach out to your insurance provider with any questions.    If during the course of the call the physician/provider feels a video visit is not appropriate, you will not be charged for this service.\"    Patient has given verbal consent for Video visit? Yes  How would you like to obtain your AVS? MyChart  If you are dropped from the video visit, the video invite should be resent to: Send to e-mail at: [email or web address]  Will anyone else be joining your video visit? No      Subjective     Lev Copeland is a 49 year old male who presents today via video visit for the following health issues:    HPI       Insect/bug bite-     Onset: 2 week(s) ago     Description:        Type of insect: bee        Location of bite: stomach    Accompanying Signs & Symptoms:        Itching: YES, has gotten better              Redness: YES, bruising as well        Warmth: no        Swelling: no        Pain: none        Drainage: no        Fever: no        Chest Pain: no        Shortness of breath: no                              History of allergic reactions:    Anaphylaxis: no  Hives: no       If so, did you have/use an epi-pen:  no    Therapies tried and outcome: benadryl lotion/gel with moderate relief of itching         Video Start " "Time: 2:00 PM    Stepped on a bee hive 2 weeks ago and sustained 3 stings:   Right uper arm  Right shin   Belly    Arm and leg sites have healed with slight itchiness  The spot on his belly developed a red ring, about 6\" in diameter with some bruising in the center.  It was not hot or painful.  He sent a photo of this site with his MyChart encounter 10/5/2020.      Never developed fever, myalgias, fatigue.  Red ring never expanded further.  The redness and bruising are now fading    He is certain this was not a tick bite.      Review of Systems   Constitutional, derm, and msk systems are negative, except as otherwise noted.      Objective    Vitals - Patient Reported  Weight (Patient Reported): 86.2 kg (190 lb)  Height (Patient Reported): 177.8 cm (5' 10\")  BMI (Based on Pt Reported Ht/Wt): 27.26      Vitals:  No vitals were obtained today due to virtual visit.    Physical Exam     GENERAL: Healthy, alert and no distress  EYES: Eyes grossly normal to inspection.  No discharge or erythema, or obvious scleral/conjunctival abnormalities.  RESP: No audible wheeze, cough, or visible cyanosis.  No visible retractions or increased work of breathing.    SKIN: Visible skin clear. No significant rash, abnormal pigmentation or lesions.  NEURO: Cranial nerves grossly intact.  Mentation and speech appropriate for age.  PSYCH: Mentation appears normal, affect normal/bright, judgement and insight intact, normal speech and appearance well-groomed.    I reviewed photo from 10/5.           Assessment & Plan     Accidental bee sting  Most likely a local reaction.  Discussed anticipated course of continued improvement until resolution.  Given that it did not continue to expand and is now fading this is not consistent with cellulitis.  As he is certain this was not a tick bite I do not feel the need to check for Lyme.  He'll continue to treat the itching with topical benadryl and notify me of any worsening.            No follow-ups on " file.    Joaquina Goldman MD  [facility]      Video-Visit Details    Type of service:  Video Visit    Video End Time:2:07 PM    Originating Location (pt. Location): Home    Distant Location (provider location):  [facility]     Platform used for Video Visit: [employer]

## 2020-10-14 NOTE — MR AVS SNAPSHOT
After Visit Summary   11/29/2018    Lev Copeland    MRN: [id number]           Patient Information     Date Of Birth          1970        Visit Information        Provider Department      11/29/2018 8:40 AM Joaquina Goldman MD [facility] [place]        Today's Diagnoses     Hypertension goal BP (blood pressure) < 140/90        Anxiety        Isolated or specific phobia          Care Instructions    Check with your insurance on the coverage of Shingrix (new shingles vaccine).   The Shingrix vaccination is a 2-shot series.  The second dose is given 6 months after the first.  (It cannot be given sooner than 2 months after the first dose - at the earliest.)  You should be aware that patients are reporting feeling a bit under the weather after the injection, including fatigue, malaise, and low-grade fever that may last a couple days.  Rarely patients can get a mild, shingles-like rash.   But these symptoms are much better than the Shingles disease.             Follow-ups after your visit        Follow-up notes from your care team     Return in about 9 months (around 8/29/2019).      Your next 10 appointments already scheduled     Feb 26, 2019  9:00 AM CST   LAB with [facility]   [facility] [place] ([facility] [place]    [address] [place] [id number] [phone]           Please do not eat 10-12 hours before your appointment if you are coming in fasting for labs on lipids, cholesterol, or glucose (sugar). This does not apply to pregnant women. Water, hot tea and black coffee (with nothing added) are okay. Do not drink other fluids, diet soda or chew gum.            Mar 05, 2019 11:30 AM CST   (Arrive by 11:15 AM)   Return Visit with Zack[facility] Card[facility]   [facility]    [address] [address] [phone]              Who to contact     If you have questions or need  follow up information about today's clinic visit or your schedule please contact [facility] directly at [phone].  Normal or non-critical lab and imaging results will be communicated to you by [employer], letter or phone within 4 business days after the clinic has received the results. If you do not hear from us within 7 days, please contact the clinic through [employer] or phone. If you have a critical or abnormal lab result, we will notify you by phone as soon as possible.  Submit refill requests through [employer] or call your pharmacy and they will forward the refill request to us. Please allow 3 business days for your refill to be completed.          Additional Information About Your Visit        [employer] Information     [employer] gives you secure access to your electronic health record. If you see a primary care provider, you can also send messages to your care team and make appointments. If you have questions, please call your primary care clinic.  If you do not have a primary care provider, please call [phone] and they will assist you.        Care EveryWhere ID     This is your Care EveryWhere ID. This could be used by other organizations to access your [facility] medical records  [id number]        Your Vitals Were     Pulse Temperature Respirations Pulse Oximetry BMI (Body Mass Index)       76 95.6  F (35.3  C) (Oral) 16 93% 29.56 kg/m2        Blood Pressure from Last 3 Encounters:   11/29/18 105/69   08/29/18 117/83   08/28/18 105/70    Weight from Last 3 Encounters:   11/29/18 206 lb (93.4 kg)   08/28/18 206 lb 1.6 oz (93.5 kg)   08/23/18 204 lb 12.9 oz (92.9 kg)              Today, you had the following     No orders found for display         Today's Medication Changes          These changes are accurate as of 11/29/18  9:23 AM.  If you have any questions, ask your nurse or doctor.               These medicines have changed or have updated prescriptions.        Dose/Directions    LORazepam  0.5 MG tablet   Commonly known as:  ATIVAN   This may have changed:  reasons to take this   Used for:  Isolated or specific phobia, Anxiety   Changed by:  Joaquina Goldman MD        Dose:  0.5 mg   Take 1 tablet (0.5 mg) by mouth 2 times daily as needed for anxiety   Quantity:  60 tablet   Refills:  0            Where to get your medicines      These medications were sent to [facility] - [place], [place] - [address]  [address], [place] [address]     Phone:  [phone]     lisinopril-hydrochlorothiazide 20-25 MG per tablet    metoprolol succinate ER 50 MG 24 hr tablet         Some of these will need a paper prescription and others can be bought over the counter.  Ask your nurse if you have questions.     Bring a paper prescription for each of these medications     LORazepam 0.5 MG tablet                Primary Care Provider Office Phone # Fax #    Joaquina Goldman [phone] [address] [place] [id number]        Equal Access to Services     [facility] Center[facility] : Hadii sharon casas hadasho Soomaali, waaxda luqadaha, qaybta kaalmada adeegyada, waxjovani hsu . So [facility] [phone].    ATENCIÓN: Si habla español, tiene a lin disposición servicios gratuitos de asistencia lingüística. [facility] [phone].    We comply with applicable federal civil rights laws and [place] laws. We do not discriminate on the basis of race, color, national origin, age, disability, sex, sexual orientation, or gender identity.            Thank you!     Thank you for choosing [facility]  for your care. Our goal is always to provide you with excellent care. Hearing back from our patients is one way we can continue to improve our services. Please take a few minutes to complete the written survey that you may receive in the mail after your visit with us. Thank you!             Your Updated Medication List - Protect others around you: Learn how to  safely use, store and throw away your medicines at www.disposemymeds.org.          This list is accurate as of 11/29/18  9:23 AM.  Always use your most recent med list.                   Brand Name Dispense Instructions for use Diagnosis    aspirin 81 MG tablet    ASA    90 tablet    Take 1 tablet (81 mg) by mouth daily    Coronary artery disease involving native coronary artery of native heart, angina presence unspecified       atorvastatin 40 MG tablet    LIPITOR    90 tablet    Take 1 tablet (40 mg) by mouth daily    Chest pain, unspecified type       lisinopril-hydrochlorothiazide 20-25 MG per tablet    PRINZIDE/ZESTORETIC    90 tablet    Take 1 tablet by mouth every morning    Hypertension goal BP (blood pressure) < 140/90       LORazepam 0.5 MG tablet    ATIVAN    60 tablet    Take 1 tablet (0.5 mg) by mouth 2 times daily as needed for anxiety    Isolated or specific phobia, Anxiety       metoprolol succinate ER 50 MG 24 hr tablet    TOPROL-XL    90 tablet    Take 1 tablet (50 mg) by mouth daily    Hypertension goal BP (blood pressure) < 140/90, Anxiety          Surgeon/Pathologist Verbiage (Will Incorporate Name Of Surgeon From Intro If Not Blank): operated in two distinct and integrated capacities as the surgeon and pathologist.

## 2021-02-20 ENCOUNTER — MYC REFILL (OUTPATIENT)
Dept: FAMILY MEDICINE | Facility: CLINIC | Age: 51
End: 2021-02-20

## 2021-02-20 DIAGNOSIS — F41.9 ANXIETY: ICD-10-CM

## 2021-02-20 DIAGNOSIS — F40.298 ISOLATED OR SPECIFIC PHOBIA: ICD-10-CM

## 2021-02-24 NOTE — TELEPHONE ENCOUNTER
Routing refill request to provider for review/approval because:  Drug not on the [facility] refill protocol       Last Written Prescription Date:  3/5/2020  Last Fill Quantity: 60,  # refills: 0   Last office visit: 3/5/2020 with prescribing provider:     Future Office Visit:

## 2021-02-25 RX ORDER — LORAZEPAM 0.5 MG/1
0.5 TABLET ORAL 2 TIMES DAILY PRN
Qty: 60 TABLET | Refills: 0 | OUTPATIENT
Start: 2021-02-25

## 2021-02-26 NOTE — TELEPHONE ENCOUNTER
Reception, Please schedule patient for office visit with fasting labs.  He can do this as a routine (annual) preventive visit or just for follow-up of hypertension, hyperlipidemia, and anxiety.  Please advise that he come fasting - Fast for 10 hours prior to labs: nothing to eat or drink except plain water and your pills for ten hours prior to appointment.  In addition, avoid fatty foods and alcohol for 24 hours prior to appointment.   Joaquina Goldman MD

## 2021-03-02 NOTE — PROGRESS NOTES
Lev is a 50 year old who is being evaluated via a billable video visit.      How would you like to obtain your AVS? MyChart  If the video visit is dropped, the invitation should be resent by: Text to cell phone: [phone]   Will anyone else be joining your video visit? No    Video Start Time: 11:13 AM    Assessment & Plan     Isolated or specific phobia  Anxiety  Controlled with rare prn use of lorazepam.  Last dispense of #60 has lasted a year and I renewed this.    - LORazepam (ATIVAN) 0.5 MG tablet  Dispense: 60 tablet; Refill: 0  - metoprolol succinate ER (TOPROL-XL) 50 MG 24 hr tablet  Dispense: 90 tablet; Refill: 1    Hypertension goal BP (blood pressure) < 140/90  stable/well controlled - with weight loss we may be able to decrease lisinopril-HCTZ dose.  I recommended he schedule a routine (annual) preventive visit in the summer so we can assess that.    - **Basic metabolic panel FUTURE anytime  - metoprolol succinate ER (TOPROL-XL) 50 MG 24 hr tablet  Dispense: 90 tablet; Refill: 1  - lisinopril-hydrochlorothiazide (ZESTORETIC) 20-25 MG tablet  Dispense: 90 tablet; Refill: 1      Mixed hyperlipidemia  - Lipid panel reflex to direct LDL Fasting  - atorvastatin (LIPITOR) 40 MG tablet  Dispense: 90 tablet; Refill: 1    Screening for HIV (human immunodeficiency virus)  - **HIV Antigen Antibody Combo FUTURE anytime    Need for hepatitis C screening test  - **Hepatitis C Screen Reflex to RNA FUTURE anytime     I recommended he schedule a fasting lab-only appointment for monitoring labs sometime in the next few months.        No follow-ups on file.    Joaqiuna Goldman MD  [facility]          Patricia Ohara is a 50 year old who presents for the following health issues     HPI       Hyperlipidemia Follow-Up  He continues to take atorvastatin with no problems or noted side effects.     Are you regularly taking any medication or supplement to lower your cholesterol?   Yes-  atorvastatin    Are you having muscle aches or other side effects that you think could be caused by your cholesterol lowering medication?  No    Hypertension Follow-up  He continues to take lisinopril-HCTZ and metoprolol with no problems or noted side effects 110/68    Do you check your blood pressure regularly outside of the clinic? Yes     Are you following a low salt diet? Yes    Are your blood pressures ever more than 140 on the top number (systolic) OR more   than 90 on the bottom number (diastolic), for example 140/90? No    Anxiety Follow-Up    How are you doing with your anxiety since your last visit? No change    Are you having other symptoms that might be associated with anxiety? Yes:  -    Have you had a significant life event? No     Are you feeling depressed? No    Do you have any concerns with your use of alcohol or other drugs? No     Has been working remotely since the beginning of the COVID-19 pandemic.  Has been exercising and has lost weight.  He has a treadmill at home and walks daily as well.    Social History     Tobacco Use     Smoking status: Never Smoker     Smokeless tobacco: Never Used   Substance Use Topics     Alcohol use: Yes     Comment: 4 Drinks per week     Drug use: No     CHANDNI-7 SCORE 12/27/2017 8/23/2018 5/30/2019   Total Score - - 0 (minimal anxiety)   Total Score 2 5 0     PHQ 8/23/2018 5/30/2019 3/3/2021   PHQ-9 Total Score - 0 0   Q9: Thoughts of better off dead/self-harm past 2 weeks Not at all Not at all Not at all           How many servings of fruits and vegetables do you eat daily?  2-3    On average, how many sweetened beverages do you drink each day (Examples: soda, juice, sweet tea, etc.  Do NOT count diet or artificially sweetened beverages)?   0    How many days per week do you exercise enough to make your heart beat faster? 3 or less    How many minutes a day do you exercise enough to make your heart beat faster? 10 - 19    How many days per week do you miss taking  your medication? 0        Review of Systems   RESP:  NEGATIVE for shortness of breath  CV:  NEGATIVE for chest pain and dizziness        Objective           Vitals:  No vitals were obtained today due to virtual visit.    Physical Exam   GENERAL: Healthy, alert and no distress  EYES: Eyes grossly normal to inspection.  No discharge or erythema, or obvious scleral/conjunctival abnormalities.  RESP: No audible wheeze, cough, or visible cyanosis.  No visible retractions or increased work of breathing.    SKIN: Visible skin clear. No significant rash, abnormal pigmentation or lesions.  NEURO: Cranial nerves grossly intact.  Mentation and speech appropriate for age.  PSYCH: Mentation appears normal, affect normal/bright, judgement and insight intact, normal speech and appearance well-groomed.        Video-Visit Details    Type of service:  Video Visit    Video End Time:11:25 AM    Originating Location (pt. Location): Home    Distant Location (provider location):  [facility]     Platform used for Video Visit: [employer]

## 2021-03-02 NOTE — PATIENT INSTRUCTIONS
Schedule a fasting lab-only appointment at your convenience.  Fast for 10 hours prior to labs: nothing to eat or drink except plain water and your pills for ten hours prior to appointment.  In addition, avoid fatty foods and alcohol for 24 hours prior to appointment.

## 2021-03-03 ENCOUNTER — VIRTUAL VISIT (OUTPATIENT)
Dept: FAMILY MEDICINE | Facility: CLINIC | Age: 51
End: 2021-03-03
Payer: COMMERCIAL

## 2021-03-03 DIAGNOSIS — I10 HYPERTENSION GOAL BP (BLOOD PRESSURE) < 140/90: ICD-10-CM

## 2021-03-03 DIAGNOSIS — E78.2 MIXED HYPERLIPIDEMIA: ICD-10-CM

## 2021-03-03 DIAGNOSIS — F41.9 ANXIETY: ICD-10-CM

## 2021-03-03 DIAGNOSIS — F40.298 ISOLATED OR SPECIFIC PHOBIA: Primary | ICD-10-CM

## 2021-03-03 DIAGNOSIS — Z11.59 NEED FOR HEPATITIS C SCREENING TEST: ICD-10-CM

## 2021-03-03 DIAGNOSIS — Z11.4 SCREENING FOR HIV (HUMAN IMMUNODEFICIENCY VIRUS): ICD-10-CM

## 2021-03-03 PROCEDURE — 99214 OFFICE O/P EST MOD 30 MIN: CPT | Mod: 95 | Performed by: FAMILY MEDICINE

## 2021-03-03 RX ORDER — METOPROLOL SUCCINATE 50 MG/1
50 TABLET, EXTENDED RELEASE ORAL DAILY
Qty: 90 TABLET | Refills: 1 | Status: SHIPPED | OUTPATIENT
Start: 2021-03-03 | End: 2021-09-22

## 2021-03-03 RX ORDER — LORAZEPAM 0.5 MG/1
0.5 TABLET ORAL 2 TIMES DAILY PRN
Qty: 60 TABLET | Refills: 0 | Status: SHIPPED | OUTPATIENT
Start: 2021-03-03 | End: 2022-09-16

## 2021-03-03 RX ORDER — LISINOPRIL AND HYDROCHLOROTHIAZIDE 20; 25 MG/1; MG/1
1 TABLET ORAL EVERY MORNING
Qty: 90 TABLET | Refills: 1 | Status: SHIPPED | OUTPATIENT
Start: 2021-03-03 | End: 2021-10-19

## 2021-03-03 RX ORDER — ATORVASTATIN CALCIUM 40 MG/1
40 TABLET, FILM COATED ORAL DAILY
Qty: 90 TABLET | Refills: 1 | Status: SHIPPED | OUTPATIENT
Start: 2021-03-03 | End: 2021-10-21

## 2021-03-03 ASSESSMENT — PATIENT HEALTH QUESTIONNAIRE - PHQ9: SUM OF ALL RESPONSES TO PHQ QUESTIONS 1-9: 0

## 2021-03-20 ENCOUNTER — MYC MEDICAL ADVICE (OUTPATIENT)
Dept: FAMILY MEDICINE | Facility: CLINIC | Age: 51
End: 2021-03-20

## 2021-04-17 ENCOUNTER — HEALTH MAINTENANCE LETTER (OUTPATIENT)
Age: 51
End: 2021-04-17

## 2021-05-12 DIAGNOSIS — E78.2 MIXED HYPERLIPIDEMIA: ICD-10-CM

## 2021-05-12 DIAGNOSIS — Z11.59 NEED FOR HEPATITIS C SCREENING TEST: ICD-10-CM

## 2021-05-12 DIAGNOSIS — I10 HYPERTENSION GOAL BP (BLOOD PRESSURE) < 140/90: ICD-10-CM

## 2021-05-12 DIAGNOSIS — Z11.4 SCREENING FOR HIV (HUMAN IMMUNODEFICIENCY VIRUS): ICD-10-CM

## 2021-05-12 LAB
ANION GAP SERPL CALCULATED.3IONS-SCNC: 4 MMOL/L (ref 3–14)
BUN SERPL-MCNC: 16 MG/DL (ref 7–30)
CALCIUM SERPL-MCNC: 9 MG/DL (ref 8.5–10.1)
CHLORIDE SERPL-SCNC: 108 MMOL/L (ref 94–109)
CHOLEST SERPL-MCNC: 140 MG/DL
CO2 SERPL-SCNC: 30 MMOL/L (ref 20–32)
CREAT SERPL-MCNC: 0.84 MG/DL (ref 0.66–1.25)
GFR SERPL CREATININE-BSD FRML MDRD: >90 ML/MIN/{1.73_M2}
GLUCOSE SERPL-MCNC: 100 MG/DL (ref 70–99)
HDLC SERPL-MCNC: 57 MG/DL
LDLC SERPL CALC-MCNC: 64 MG/DL
NONHDLC SERPL-MCNC: 83 MG/DL
POTASSIUM SERPL-SCNC: 4.2 MMOL/L (ref 3.4–5.3)
SODIUM SERPL-SCNC: 142 MMOL/L (ref 133–144)
TRIGL SERPL-MCNC: 93 MG/DL

## 2021-05-12 PROCEDURE — 86803 HEPATITIS C AB TEST: CPT | Performed by: FAMILY MEDICINE

## 2021-05-12 PROCEDURE — 80061 LIPID PANEL: CPT | Performed by: FAMILY MEDICINE

## 2021-05-12 PROCEDURE — 87389 HIV-1 AG W/HIV-1&-2 AB AG IA: CPT | Performed by: FAMILY MEDICINE

## 2021-05-12 PROCEDURE — 80048 BASIC METABOLIC PNL TOTAL CA: CPT | Performed by: FAMILY MEDICINE

## 2021-05-12 PROCEDURE — 36415 COLL VENOUS BLD VENIPUNCTURE: CPT | Performed by: FAMILY MEDICINE

## 2021-05-13 LAB
HCV AB SERPL QL IA: NONREACTIVE
HIV 1+2 AB+HIV1 P24 AG SERPL QL IA: NONREACTIVE

## 2021-05-14 NOTE — RESULT ENCOUNTER NOTE
Abdi Ohara,  Thanks for coming in to clinic for labs.  Your test results fall within the expected range(s) or remain unchanged from previous results.  Please continue with your current treatment plan.    Joaquina Goldman MD

## 2021-09-19 ENCOUNTER — MYC MEDICAL ADVICE (OUTPATIENT)
Dept: FAMILY MEDICINE | Facility: CLINIC | Age: 51
End: 2021-09-19

## 2021-09-19 DIAGNOSIS — I10 HYPERTENSION GOAL BP (BLOOD PRESSURE) < 140/90: ICD-10-CM

## 2021-09-19 DIAGNOSIS — F41.9 ANXIETY: ICD-10-CM

## 2021-09-22 RX ORDER — METOPROLOL SUCCINATE 50 MG/1
50 TABLET, EXTENDED RELEASE ORAL DAILY
Qty: 90 TABLET | Refills: 0 | Status: SHIPPED | OUTPATIENT
Start: 2021-09-22 | End: 2021-12-07

## 2021-10-02 ENCOUNTER — HEALTH MAINTENANCE LETTER (OUTPATIENT)
Age: 51
End: 2021-10-02

## 2021-10-14 ENCOUNTER — MYC MEDICAL ADVICE (OUTPATIENT)
Dept: FAMILY MEDICINE | Facility: CLINIC | Age: 51
End: 2021-10-14

## 2021-10-14 DIAGNOSIS — I10 HYPERTENSION GOAL BP (BLOOD PRESSURE) < 140/90: ICD-10-CM

## 2021-10-19 ENCOUNTER — MYC MEDICAL ADVICE (OUTPATIENT)
Dept: FAMILY MEDICINE | Facility: CLINIC | Age: 51
End: 2021-10-19

## 2021-10-19 RX ORDER — LISINOPRIL AND HYDROCHLOROTHIAZIDE 20; 25 MG/1; MG/1
1 TABLET ORAL EVERY MORNING
Qty: 90 TABLET | Refills: 0 | Status: SHIPPED | OUTPATIENT
Start: 2021-10-19 | End: 2021-12-07

## 2021-10-19 NOTE — TELEPHONE ENCOUNTER
Dr Goldman -  CAIN 1 time 3 month refill was sent.  Patient has not had a blood pressure check in over a year, but at that time it was 110/66.  Do you need to see patient in clinic or just a BP check or can he report home reading if able to check at home?  Jaki Pfeiffer RN  [facility]

## 2021-10-19 NOTE — TELEPHONE ENCOUNTER
"He does need an in-clinic visit at least once a year.  I have not seen him in person since March 2020.  I recommend he schedule a routine preventive visit (\"annual physical\") some time in the next few months.    Joaquina Goldman MD   "

## 2021-10-20 NOTE — TELEPHONE ENCOUNTER
Writer responded via [employer].    TORITO BernardN, RN  [facility]

## 2021-10-21 DIAGNOSIS — E78.2 MIXED HYPERLIPIDEMIA: ICD-10-CM

## 2021-10-21 RX ORDER — ATORVASTATIN CALCIUM 40 MG/1
40 TABLET, FILM COATED ORAL DAILY
Qty: 90 TABLET | Refills: 3 | Status: SHIPPED | OUTPATIENT
Start: 2021-10-21 | End: 2022-05-20

## 2021-12-06 ASSESSMENT — ENCOUNTER SYMPTOMS
WEAKNESS: 0
ABDOMINAL PAIN: 0
HEARTBURN: 0
DIARRHEA: 0
NERVOUS/ANXIOUS: 0
CONSTIPATION: 0
EYE PAIN: 0
PARESTHESIAS: 0
JOINT SWELLING: 0
SORE THROAT: 0
PALPITATIONS: 0
CHILLS: 0
FREQUENCY: 0
DIZZINESS: 0
MYALGIAS: 0
SHORTNESS OF BREATH: 0
HEMATOCHEZIA: 0
COUGH: 0
ARTHRALGIAS: 0
DYSURIA: 0
NAUSEA: 0
FEVER: 0
HEMATURIA: 0
HEADACHES: 0

## 2021-12-06 NOTE — PROGRESS NOTES
SUBJECTIVE:   CC: Lev Copeland is an 51 year old male who presents for preventative health visit.       Patient has been advised of split billing requirements and indicates understanding: Yes     Healthy Habits:     Getting at least 3 servings of Calcium per day:  Yes    Bi-annual eye exam:  Yes    Dental care twice a year:  Yes    Sleep apnea or symptoms of sleep apnea:  None    Diet:  Regular (no restrictions)    Frequency of exercise:  6-7 days/week    Duration of exercise:  Greater than 60 minutes    Taking medications regularly:  Yes    Medication side effects:  Not applicable and None    PHQ-2 Total Score: 0    Additional concerns today:  No       Today's PHQ-2 Score:   PHQ-2 ( 1999 [employer]) 12/6/2021   Q1: Little interest or pleasure in doing things 0   Q2: Feeling down, depressed or hopeless 0   PHQ-2 Score 0   PHQ-2 Total Score (12-17 Years)- Positive if 3 or more points; Administer PHQ-A if positive -   Q1: Little interest or pleasure in doing things Not at all   Q2: Feeling down, depressed or hopeless Not at all   PHQ-2 Score 0       Abuse: Current or Past(Physical, Sexual or Emotional)- No  Do you feel safe in your environment? Yes    Have you ever done Advance Care Planning? (For example, a Health Directive, POLST, or a discussion with a medical provider or your loved ones about your wishes): Yes, patient states has an Advance Care Planning document and will bring a copy to the clinic.    Social History     Tobacco Use     Smoking status: Never Smoker     Smokeless tobacco: Never Used   Substance Use Topics     Alcohol use: Yes     Comment: 4 Drinks per week     If you drink alcohol do you typically have >3 drinks per day or >7 drinks per week? No    No flowsheet data found.    Last PSA: No results found for: PSA    Reviewed orders with patient. Reviewed health maintenance and updated orders accordingly - Yes  BP Readings from Last 3 Encounters:   12/07/21 118/76   08/24/20 110/66   03/05/20 118/68  "   Wt Readings from Last 3 Encounters:   12/07/21 88.9 kg (196 lb)   08/24/20 88.5 kg (195 lb)   03/05/20 90.3 kg (199 lb)           Reviewed and updated as needed this visit by clinical staff  Tobacco  Allergies  Meds  Problems  Med Hx  Surg Hx  Fam Hx  Soc Hx     Reviewed and updated as needed this visit by Provider    Meds  Problems    Fam Hx        Past Medical History:   Diagnosis Date     Hypertension goal BP (blood pressure) < 140/90      Isolated or specific phobia     performance     Major depressive disorder, single episode, mild (H)      Non-obstructive CAD without angina 3/12/2019     Prothrombin gene mutation (H)      Stuttering       Past Surgical History:   Procedure Laterality Date     COLONOSCOPY  Oct. 2018     NO HISTORY OF SURGERY         Review of Systems   Constitutional: Negative for chills and fever.   HENT: Negative for congestion, ear pain, hearing loss and sore throat.    Eyes: Negative for pain and visual disturbance.   Respiratory: Negative for cough and shortness of breath.    Cardiovascular: Negative for chest pain, palpitations and peripheral edema.   Gastrointestinal: Negative for abdominal pain, constipation, diarrhea, heartburn, hematochezia and nausea.   Genitourinary: Negative for dysuria, frequency, genital sores, hematuria, impotence, penile discharge and urgency.   Musculoskeletal: Negative for arthralgias, joint swelling and myalgias.   Skin: Negative for rash.   Neurological: Negative for dizziness, weakness, headaches and paresthesias.   Psychiatric/Behavioral: Negative for mood changes. The patient is not nervous/anxious.          OBJECTIVE:   /76 (BP Location: Right arm, Patient Position: Chair, Cuff Size: Adult Regular)   Pulse 70   Temp 97  F (36.1  C) (Temporal)   Resp 16   Ht 1.803 m (5' 11\")   Wt 88.9 kg (196 lb)   SpO2 96%   BMI 27.34 kg/m      Physical Exam  GENERAL: healthy, alert and no distress  EYES: Eyes grossly normal to inspection, " "conjunctivae and sclerae normal  HENT: ear canals and TM's normal  NECK: no adenopathy, no asymmetry, masses, or scars and thyroid normal to palpation  RESP: lungs clear to auscultation - no rales, rhonchi or wheezes  CV: regular rate and rhythm, normal S1 S2, no S3 or S4, no murmur, click or rub, no peripheral edema  ABDOMEN: soft, nontender, no hepatosplenomegaly, no masses  MS: no gross musculoskeletal defects noted, no edema  SKIN: no suspicious lesions or rashes  NEURO: Normal strength and tone, mentation intact and speech normal  PSYCH: mentation appears normal, affect normal/bright       ASSESSMENT/PLAN:       ICD-10-CM    1. Routine general medical examination at a health care facility  Z00.00    2. Screen for colon cancer  Z12.11 Adult Gastro Ref - Procedure Only   3. Adenomatous polyp of colon, unspecified part of colon  D12.6 Adult Gastro Ref - Procedure Only     Discussed screening PSA and we'll plan to do that with next labs (May 2022).    I'll see him in May for his chronic condition follow-up.    Patient has been advised of split billing requirements and indicates understanding: n/a     COUNSELING:   Reviewed preventive health counseling, as reflected in patient instructions    Estimated body mass index is 27.34 kg/m  as calculated from the following:    Height as of this encounter: 1.803 m (5' 11\").    Weight as of this encounter: 88.9 kg (196 lb).     Weight management plan: Discussed healthy diet and exercise guidelines    He reports that he has never smoked. He has never used smokeless tobacco.      Counseling Resources:  ATP IV Guidelines  Pooled Cohorts Equation Calculator  FRAX Risk Assessment  ICSI Preventive Guidelines  Dietary Guidelines for Americans, 2010  USDA's MyPlate  ASA Prophylaxis  Lung CA Screening    Joaquina Goldman MD  [facility]  "

## 2021-12-06 NOTE — PATIENT INSTRUCTIONS
I recommend that patients 50 and over get the Shingrix vaccine at a pharmacy.  The pharmacist will let you know beforehand if there is a copay and can administer the vaccine.  The Shingrix vaccination is a 2-shot series.  The second dose is given 6 months after the first.  (It cannot be given sooner than 2 months after the first dose - at the earliest.)  You should be aware that patients are reporting feeling a bit under the weather after the injection, including fatigue, malaise, and low-grade fever that may last a couple days.  Rarely patients can get a mild, shingles-like rash.   But these symptoms are much better than the Shingles disease.     I kindly request that you check your MyChart prior to all appointments with me and complete any assigned questionnaires ahead of time.  (Or you can come a bit early to your appointment and complete questionnaires on one of our tablets.)  This frees up more of our designated visit time to address your health issues. If you have not already done so, I encourage you to sign up for [employer] ([email or web address]).  This will allow you to review your results, securely communicate with your provider care team, and schedule virtual visits as well.    FYI:  I do telehealth (video) visits exclusively on Wednesdays.  I still do in-person visits at [facility] ([phone]) on Mondays, Tuesdays and Thursdays.  You can schedule a video visit for many conditions.  Please follow this link:  [email or web address]    To schedule any ordered imaging studies (including mammogram) you can call [facility] Scheduling at [phone].  If you are scheduling a DEXA (bone density) scan please do NOT schedule this at the [facility].  Please ask that it be done at [facility] in [place].  Other preferred DEXA locations include [place] (at the [facility]  Yadira Woodson or [place].        Preventive Health Recommendations  Male Ages 50 - 64    Yearly exam:             See your health care provider every year in order to  o   Review health changes.   o   Discuss preventive care.    o   Review your medicines if your doctor has prescribed any.     Have a cholesterol test every 5 years, or more frequently if you are at risk for high cholesterol/heart disease.     Have a diabetes test (fasting glucose) every three years. If you are at risk for diabetes, you should have this test more often.     Have a colonoscopy at age 50, or have a yearly FIT test (stool test). These exams will check for colon cancer.      Talk with your health care provider about whether or not a prostate cancer screening test (PSA) is right for you.    You should be tested each year for STDs (sexually transmitted diseases), if you re at risk.     Shots: Get a flu shot each year. Get a tetanus shot every 10 years.     Nutrition:    Eat at least 5 servings of fruits and vegetables daily.     Eat whole-grain bread, whole-wheat pasta and brown rice instead of white grains and rice.     Get adequate Calcium and Vitamin D.     Lifestyle    Exercise for at least 150 minutes a week (30 minutes a day, 5 days a week). This will help you control your weight and prevent disease.     Limit alcohol to one drink per day.     No smoking.     Wear sunscreen to prevent skin cancer.     See your dentist every six months for an exam and cleaning.     See your eye doctor every 1 to 2 years.

## 2021-12-07 ENCOUNTER — OFFICE VISIT (OUTPATIENT)
Dept: FAMILY MEDICINE | Facility: CLINIC | Age: 51
End: 2021-12-07
Payer: COMMERCIAL

## 2021-12-07 VITALS
RESPIRATION RATE: 16 BRPM | HEART RATE: 70 BPM | BODY MASS INDEX: 27.44 KG/M2 | HEIGHT: 71 IN | DIASTOLIC BLOOD PRESSURE: 76 MMHG | OXYGEN SATURATION: 96 % | TEMPERATURE: 97 F | SYSTOLIC BLOOD PRESSURE: 118 MMHG | WEIGHT: 196 LBS

## 2021-12-07 DIAGNOSIS — Z00.00 ROUTINE GENERAL MEDICAL EXAMINATION AT A HEALTH CARE FACILITY: Primary | ICD-10-CM

## 2021-12-07 DIAGNOSIS — Z12.11 SCREEN FOR COLON CANCER: ICD-10-CM

## 2021-12-07 DIAGNOSIS — D12.6 ADENOMATOUS POLYP OF COLON, UNSPECIFIED PART OF COLON: ICD-10-CM

## 2021-12-07 PROCEDURE — 99396 PREV VISIT EST AGE 40-64: CPT | Performed by: FAMILY MEDICINE

## 2021-12-07 RX ORDER — METOPROLOL SUCCINATE 50 MG/1
50 TABLET, EXTENDED RELEASE ORAL DAILY
Qty: 90 TABLET | Refills: 1 | Status: SHIPPED | OUTPATIENT
Start: 2021-12-07 | End: 2022-05-20

## 2021-12-07 RX ORDER — LISINOPRIL AND HYDROCHLOROTHIAZIDE 20; 25 MG/1; MG/1
1 TABLET ORAL EVERY MORNING
Qty: 90 TABLET | Refills: 1 | Status: SHIPPED | OUTPATIENT
Start: 2021-12-07 | End: 2022-05-20

## 2021-12-07 ASSESSMENT — MIFFLIN-ST. JEOR: SCORE: 1756.18

## 2021-12-14 ENCOUNTER — TELEPHONE (OUTPATIENT)
Dept: GASTROENTEROLOGY | Facility: CLINIC | Age: 51
End: 2021-12-14
Payer: COMMERCIAL

## 2021-12-14 NOTE — TELEPHONE ENCOUNTER
Screening Questions  1. Are you active on mychart? YES    2. What insurance is in the chart? BCBS    2.  Ordering/Referring Provider: Joaquina Goldman MD     3. BMI 26.5, If greater than 40 review exclusion criteria    4.  Respiratory Screening (If yes to any of the following Hospital setting only):     Do you use daily home oxygen? No   Do you have mod to severe Obstructive Sleep Apnea? No    Do you have Pulmonary Hypertension? No    Do you have UNCONTROLLED asthma? No     5. Have you had a heart or lung transplant (If yes, please review exclusion criteria) ? No     6. Are you currently on dialysis or have chronic kidney disease? No     7. Have you had a stroke or Transient ischemic attack (TIA) within 6 months? No     8. In the past 6 months, have you had any heart related issues including cardiomyopathy or heart attack? No                  If yes, did it require cardiac stenting or other implantable device?      9. Do you have any implantable devices in your body (pacemaker, defib, LVAD)? No    10. Do you take nitroglycerin? If yes, how often? No     11. Are you currently taking any blood thinners?no     12. Are you a diabetic? No (asprin)    13. (Females) Are you currently pregnant?   If yes, how many weeks?      15. Are you taking any prescription pain medications on a routine schedule? No  If yes, MAC sedation.    16. Do you have any chemical dependencies such as alcohol, street drugs, or methadone? No If yes, MAC sedation.    17. Do you have any history of post-traumatic stress syndrome, severe anxiety or history of psychosis? No     18. Do you transfer independently? Yes     19.  Do you have any issues with constipation? No     20. Preferred Pharmacy for Pre Prescription [facility] - [place], [place] - [address]    Scheduling Details    Which Colonoscopy Prep was Sent?: Miralax  Procedure Scheduled: colon  Surgeon: Kailey  Date of Procedure: colon  [place] [facility] [place]er (Please ask  for phone number if not scheduled by patient): Lev oCpeland      Sedation Type: CS  Conscious Sedation- Needs  for 6 hours after the procedure  MAC/General-Needs  for 24 hours after procedure    Pre-op Required at [facility], [place], [place] and OR for MAC sedation:   (if yes advise patient they will need a pre-op prior to procedure)      Is patient on blood thinners? -no (If yes- inform patient to follow up with PCP or provider for follow up instructions)     Informed patient they will need an adult  yes  Cannot take any type of public or medical transportation alone    [facility]-Procedure [facility] or Externally: yes at external    Confirmed Nurse will call to complete assessment yes    Additional comments: no

## 2022-01-26 DIAGNOSIS — Z11.59 ENCOUNTER FOR SCREENING FOR OTHER VIRAL DISEASES: Primary | ICD-10-CM

## 2022-01-31 ENCOUNTER — TELEPHONE (OUTPATIENT)
Dept: GASTROENTEROLOGY | Facility: CLINIC | Age: 52
End: 2022-01-31
Payer: COMMERCIAL

## 2022-01-31 NOTE — TELEPHONE ENCOUNTER
Attempted to contact patient regarding upcoming colonoscopy procedure on 2.7.2022 for pre assessment questions. No answer.     Left message to return call to [phone] #2    Covid test?  MyChart message sent.     Arrival time: 0745    Facility location: [facility]    Sedation type: CS    Indication for procedure: screening colonoscopy    Bowel prep recommendation: Miralax/Magnesium citrate/Dulcolax    Claudia Nam RN

## 2022-01-31 NOTE — TELEPHONE ENCOUNTER
Patient returned call.    Pre assessment questions completed for upcoming colonoscopy procedure scheduled on 2/7/22    COVID test scheduled?- 2/5/22 - [facility] in [place]. Patient will upload to [employer]. Patient is aware that test needs to be a PCR test.     Reviewed procedural arrival time 0745 and facility location [facility].    Designated  policy reviewed. Instructed to have someone stay 6 hours post procedure.     Reviewed Miralax prep instructions with patient. No fiber/iron supplements or foods that contain nuts/seeds 7 days prior to procedure.     Anticoagulation/blood thinners? no    Electronic implanted devices? no    Patient verbalized understanding and had no questions or concerns at this time.    Aziza Hassan RN

## 2022-02-04 RX ORDER — ONDANSETRON 2 MG/ML
4 INJECTION INTRAMUSCULAR; INTRAVENOUS
Status: CANCELLED | OUTPATIENT
Start: 2022-02-04

## 2022-02-04 RX ORDER — LIDOCAINE 40 MG/G
CREAM TOPICAL
Status: CANCELLED | OUTPATIENT
Start: 2022-02-04

## 2022-02-07 ENCOUNTER — HOSPITAL ENCOUNTER (OUTPATIENT)
Facility: AMBULATORY SURGERY CENTER | Age: 52
End: 2022-02-07
Attending: INTERNAL MEDICINE
Payer: COMMERCIAL

## 2022-02-07 ENCOUNTER — ANESTHESIA EVENT (OUTPATIENT)
Dept: SURGERY | Facility: AMBULATORY SURGERY CENTER | Age: 52
End: 2022-02-07
Payer: COMMERCIAL

## 2022-02-07 ENCOUNTER — ANESTHESIA (OUTPATIENT)
Dept: SURGERY | Facility: AMBULATORY SURGERY CENTER | Age: 52
End: 2022-02-07
Payer: COMMERCIAL

## 2022-02-07 VITALS
WEIGHT: 188 LBS | HEIGHT: 70 IN | SYSTOLIC BLOOD PRESSURE: 131 MMHG | HEART RATE: 52 BPM | DIASTOLIC BLOOD PRESSURE: 79 MMHG | BODY MASS INDEX: 26.92 KG/M2 | TEMPERATURE: 97.1 F | OXYGEN SATURATION: 98 % | RESPIRATION RATE: 18 BRPM

## 2022-02-07 VITALS — HEART RATE: 57 BPM

## 2022-02-07 LAB — COLONOSCOPY: NORMAL

## 2022-02-07 PROCEDURE — 45380 COLONOSCOPY AND BIOPSY: CPT | Mod: 33,59

## 2022-02-07 PROCEDURE — 45385 COLONOSCOPY W/LESION REMOVAL: CPT | Mod: 33

## 2022-02-07 PROCEDURE — 88305 TISSUE EXAM BY PATHOLOGIST: CPT | Mod: TC | Performed by: INTERNAL MEDICINE

## 2022-02-07 PROCEDURE — 88305 TISSUE EXAM BY PATHOLOGIST: CPT | Mod: 26 | Performed by: PATHOLOGY

## 2022-02-07 RX ORDER — SODIUM CHLORIDE, SODIUM LACTATE, POTASSIUM CHLORIDE, CALCIUM CHLORIDE 600; 310; 30; 20 MG/100ML; MG/100ML; MG/100ML; MG/100ML
INJECTION, SOLUTION INTRAVENOUS CONTINUOUS PRN
Status: DISCONTINUED | OUTPATIENT
Start: 2022-02-07 | End: 2022-02-07

## 2022-02-07 RX ORDER — LIDOCAINE HYDROCHLORIDE 20 MG/ML
INJECTION, SOLUTION INFILTRATION; PERINEURAL PRN
Status: DISCONTINUED | OUTPATIENT
Start: 2022-02-07 | End: 2022-02-07

## 2022-02-07 RX ORDER — NALOXONE HYDROCHLORIDE 0.4 MG/ML
0.2 INJECTION, SOLUTION INTRAMUSCULAR; INTRAVENOUS; SUBCUTANEOUS
Status: DISCONTINUED | OUTPATIENT
Start: 2022-02-07 | End: 2022-02-08 | Stop reason: HOSPADM

## 2022-02-07 RX ORDER — PROCHLORPERAZINE MALEATE 10 MG
10 TABLET ORAL EVERY 6 HOURS PRN
Status: DISCONTINUED | OUTPATIENT
Start: 2022-02-07 | End: 2022-02-08 | Stop reason: HOSPADM

## 2022-02-07 RX ORDER — ONDANSETRON 4 MG/1
4 TABLET, ORALLY DISINTEGRATING ORAL EVERY 6 HOURS PRN
Status: DISCONTINUED | OUTPATIENT
Start: 2022-02-07 | End: 2022-02-08 | Stop reason: HOSPADM

## 2022-02-07 RX ORDER — NALOXONE HYDROCHLORIDE 0.4 MG/ML
0.4 INJECTION, SOLUTION INTRAMUSCULAR; INTRAVENOUS; SUBCUTANEOUS
Status: DISCONTINUED | OUTPATIENT
Start: 2022-02-07 | End: 2022-02-08 | Stop reason: HOSPADM

## 2022-02-07 RX ORDER — FLUMAZENIL 0.1 MG/ML
0.2 INJECTION, SOLUTION INTRAVENOUS
Status: ACTIVE | OUTPATIENT
Start: 2022-02-07 | End: 2022-02-07

## 2022-02-07 RX ORDER — ONDANSETRON 2 MG/ML
4 INJECTION INTRAMUSCULAR; INTRAVENOUS EVERY 6 HOURS PRN
Status: DISCONTINUED | OUTPATIENT
Start: 2022-02-07 | End: 2022-02-08 | Stop reason: HOSPADM

## 2022-02-07 RX ORDER — PROPOFOL 10 MG/ML
INJECTION, EMULSION INTRAVENOUS CONTINUOUS PRN
Status: DISCONTINUED | OUTPATIENT
Start: 2022-02-07 | End: 2022-02-07

## 2022-02-07 RX ORDER — PROPOFOL 10 MG/ML
INJECTION, EMULSION INTRAVENOUS PRN
Status: DISCONTINUED | OUTPATIENT
Start: 2022-02-07 | End: 2022-02-07

## 2022-02-07 RX ORDER — SIMETHICONE
LIQUID (ML) MISCELLANEOUS PRN
Status: DISCONTINUED | OUTPATIENT
Start: 2022-02-07 | End: 2022-02-07 | Stop reason: HOSPADM

## 2022-02-07 RX ADMIN — SODIUM CHLORIDE, SODIUM LACTATE, POTASSIUM CHLORIDE, CALCIUM CHLORIDE: 600; 310; 30; 20 INJECTION, SOLUTION INTRAVENOUS at 09:15

## 2022-02-07 RX ADMIN — PROPOFOL 150 MCG/KG/MIN: 10 INJECTION, EMULSION INTRAVENOUS at 09:18

## 2022-02-07 RX ADMIN — PROPOFOL 50 MG: 10 INJECTION, EMULSION INTRAVENOUS at 09:20

## 2022-02-07 RX ADMIN — LIDOCAINE HYDROCHLORIDE 60 MG: 20 INJECTION, SOLUTION INFILTRATION; PERINEURAL at 09:18

## 2022-02-07 ASSESSMENT — MIFFLIN-ST. JEOR: SCORE: 1704.01

## 2022-02-07 NOTE — H&P
Lev BOBO Min  [id number]  male  51 year old      Reason for procedure/surgery: surveillance    Patient Active Problem List   Diagnosis     Isolated or specific phobia     Hypertension goal BP (blood pressure) < 140/90     Anxiety     Adenomatous polyp of colon     Diverticulosis of large intestine     Non-obstructive CAD without angina     Prothrombin gene mutation (H)     Mixed hyperlipidemia       Past Surgical History:    Past Surgical History:   Procedure Laterality Date     COLONOSCOPY  Oct. 2018     NO HISTORY OF SURGERY         Past Medical History:   Past Medical History:   Diagnosis Date     Hypertension goal BP (blood pressure) < 140/90      Isolated or specific phobia     performance     Major depressive disorder, single episode, mild (H)      Non-obstructive CAD without angina 3/12/2019     Prothrombin gene mutation (H)      Stuttering        Social History:   Social History     Tobacco Use     Smoking status: Never Smoker     Smokeless tobacco: Never Used   Substance Use Topics     Alcohol use: Yes     Comment: 4 Drinks per week       Family History:   Family History   Problem Relation Age of Onset     Skin Cancer Mother      Heart Disease Father 63        CAB x4 at age 63     Genetic Disease Father      Hypertension Sister      Breast Cancer Sister 48       Allergies: No Known Allergies    Active Medications:   Current Outpatient Medications   Medication Sig Dispense Refill     aspirin 81 MG tablet Take 1 tablet (81 mg) by mouth daily 90 tablet 3     atorvastatin (LIPITOR) 40 MG tablet Take 1 tablet (40 mg) by mouth daily 90 tablet 3     lisinopril-hydrochlorothiazide (ZESTORETIC) 20-25 MG tablet Take 1 tablet by mouth every morning 90 tablet 1     LORazepam (ATIVAN) 0.5 MG tablet Take 1 tablet (0.5 mg) by mouth 2 times daily as needed for anxiety 60 tablet 0     metoprolol succinate ER (TOPROL-XL) 50 MG 24 hr tablet Take 1 tablet (50 mg) by mouth daily 90 tablet 1       Systemic Review:  "  CONSTITUTIONAL: NEGATIVE for fever, chills, change in weight  ENT/MOUTH: NEGATIVE for ear, mouth and throat problems  RESP: NEGATIVE for significant cough or SOB  CV: NEGATIVE for chest pain, palpitations or peripheral edema    Physical Examination:   Vital Signs: /80   Pulse 55   Temp 97.2  F (36.2  C) (Skin)   Resp 18   Ht 1.778 m (5' 10\")   Wt 85.3 kg (188 lb)   SpO2 98%   BMI 26.98 kg/m    GENERAL: healthy, alert and no distress  NECK: no adenopathy, no asymmetry, masses, or scars  RESP: lungs clear to auscultation - no rales, rhonchi or wheezes  CV: regular rate and rhythm, normal S1 S2, no S3 or S4, no murmur, click or rub, no peripheral edema and peripheral pulses strong  ABDOMEN: soft, nontender, no hepatosplenomegaly, no masses and bowel sounds normal  MS: no gross musculoskeletal defects noted, no edema    Plan: Appropriate to proceed as scheduled.      Yane Gutierrez MD  2/7/2022    PCP:  Joaquina Goldman    "

## 2022-02-07 NOTE — ANESTHESIA PREPROCEDURE EVALUATION
Anesthesia Pre-Procedure Evaluation    Patient: Lev Copeland   MRN: [id number] : 1970        Preoperative Diagnosis: Screen for colon cancer [Z12.11]    Procedure : Procedure(s):  COLONOSCOPY          Past Medical History:   Diagnosis Date     Hypertension goal BP (blood pressure) < 140/90      Isolated or specific phobia     performance     Major depressive disorder, single episode, mild (H)      Non-obstructive CAD without angina 3/12/2019     Prothrombin gene mutation (H)      Stuttering       Past Surgical History:   Procedure Laterality Date     COLONOSCOPY  Oct. 2018     NO HISTORY OF SURGERY        No Known Allergies   Social History     Tobacco Use     Smoking status: Never Smoker     Smokeless tobacco: Never Used   Substance Use Topics     Alcohol use: Yes     Comment: 4 Drinks per week      Wt Readings from Last 1 Encounters:   22 85.3 kg (188 lb)        Anesthesia Evaluation   Pt has had prior anesthetic.     No history of anesthetic complications       ROS/MED HX  ENT/Pulmonary:  - neg pulmonary ROS     Neurologic:  - neg neurologic ROS     Cardiovascular:     (+) hypertension--CAD ---    METS/Exercise Tolerance: >4 METS    Hematologic:  - neg hematologic  ROS     Musculoskeletal:  - neg musculoskeletal ROS     GI/Hepatic:  - neg GI/hepatic ROS     Renal/Genitourinary:  - neg Renal ROS     Endo:  - neg endo ROS     Psychiatric/Substance Use:     (+) psychiatric history depression     Infectious Disease:  - neg infectious disease ROS     Malignancy:  - neg malignancy ROS     Other:  - neg other ROS          Physical Exam    Airway  airway exam normal      Mallampati: I   TM distance: > 3 FB   Neck ROM: full   Mouth opening: > 3 cm    Respiratory Devices and Support         Dental  no notable dental history         Cardiovascular   cardiovascular exam normal       Rhythm and rate: regular and normal     Pulmonary   pulmonary exam normal        breath sounds clear to auscultation            OUTSIDE LABS:  CBC:   Lab Results   Component Value Date    WBC 5.7 08/23/2018    WBC 6.0 10/26/2017    HGB 14.6 08/23/2018    HGB 13.7 10/26/2017    HCT 41.7 08/23/2018    HCT 40.0 10/26/2017     08/23/2018     10/26/2017     BMP:   Lab Results   Component Value Date     05/12/2021     02/27/2020    POTASSIUM 4.2 05/12/2021    POTASSIUM 3.9 02/27/2020    CHLORIDE 108 05/12/2021    CHLORIDE 106 02/27/2020    CO2 30 05/12/2021    CO2 28 02/27/2020    BUN 16 05/12/2021    BUN 11 02/27/2020    CR 0.84 05/12/2021    CR 0.90 02/27/2020     (H) 05/12/2021    GLC 99 02/27/2020     COAGS: No results found for: PTT, INR, FIBR  POC: No results found for: BGM, HCG, HCGS  HEPATIC:   Lab Results   Component Value Date    ALBUMIN 4.3 02/06/2014    PROTTOTAL 7.9 02/06/2014    ALT 42 02/06/2014    AST 41 02/06/2014    ALKPHOS 72 02/06/2014    BILITOTAL 0.5 02/06/2014     OTHER:   Lab Results   Component Value Date    NIKKY 9.0 05/12/2021    TSH 1.82 10/26/2017    T4 1.00 10/26/2017    SED 12 02/06/2014       Anesthesia Plan    ASA Status:  2   NPO Status:  NPO Appropriate    Anesthesia Type: MAC.     - Reason for MAC: Deep or markedly invasive procedure (G8)   Induction: Propofol.   Maintenance: N/A.        Consents    Anesthesia Plan(s) and associated risks, benefits, and realistic alternatives discussed. Questions answered and patient/representative(s) expressed understanding.    - Discussed:     - Discussed with:  Patient    Use of blood products discussed: No .     Postoperative Care            Comments:           H&P reviewed: Unable to attach H&P to encounter due to EHR limitations. H&P Update: appropriate H&P reviewed, patient examined. No interval changes since H&P (within 30 days).         Varinder Morillo DO

## 2022-02-07 NOTE — ANESTHESIA POSTPROCEDURE EVALUATION
Patient: Lev Copeland    Procedure: Procedure(s):  COLONOSCOPY, WITH POLYPECTOMY       Diagnosis:Screen for colon cancer [Z12.11]  Diagnosis Additional Information: No value filed.    Anesthesia Type:  MAC    Note:  Disposition: Outpatient   Postop Pain Control: Uneventful            Sign Out: Well controlled pain   PONV: No   Neuro/Psych: Uneventful            Sign Out: Acceptable/Baseline neuro status   Airway/Respiratory: Uneventful            Sign Out: Acceptable/Baseline resp. status   CV/Hemodynamics: Uneventful            Sign Out: Acceptable CV status   Other NRE: NONE   DID A NON-ROUTINE EVENT OCCUR? No           Last vitals:  Vitals Value Taken Time   /79 02/07/22 1006   Temp 36.2  C (97.1  F) 02/07/22 1006   Pulse 52 02/07/22 1006   Resp 18 02/07/22 1006   SpO2 98 % 02/07/22 1006       Electronically Signed By: Varinder Morillo DO  February 7, 2022  1:12 PM

## 2022-02-08 LAB
PATH REPORT.COMMENTS IMP SPEC: NORMAL
PATH REPORT.COMMENTS IMP SPEC: NORMAL
PATH REPORT.FINAL DX SPEC: NORMAL
PATH REPORT.GROSS SPEC: NORMAL
PATH REPORT.MICROSCOPIC SPEC OTHER STN: NORMAL
PATH REPORT.RELEVANT HX SPEC: NORMAL
PHOTO IMAGE: NORMAL

## 2022-05-18 ENCOUNTER — LAB (OUTPATIENT)
Dept: LAB | Facility: CLINIC | Age: 52
End: 2022-05-18
Payer: COMMERCIAL

## 2022-05-18 DIAGNOSIS — Z12.5 SCREENING FOR PROSTATE CANCER: ICD-10-CM

## 2022-05-18 DIAGNOSIS — I10 HYPERTENSION GOAL BP (BLOOD PRESSURE) < 140/90: ICD-10-CM

## 2022-05-18 DIAGNOSIS — E78.2 MIXED HYPERLIPIDEMIA: ICD-10-CM

## 2022-05-18 DIAGNOSIS — Z13.220 SCREENING FOR HYPERLIPIDEMIA: ICD-10-CM

## 2022-05-18 PROCEDURE — G0103 PSA SCREENING: HCPCS

## 2022-05-18 PROCEDURE — 80048 BASIC METABOLIC PNL TOTAL CA: CPT

## 2022-05-18 PROCEDURE — 80061 LIPID PANEL: CPT

## 2022-05-18 PROCEDURE — 36415 COLL VENOUS BLD VENIPUNCTURE: CPT

## 2022-05-19 LAB
ANION GAP SERPL CALCULATED.3IONS-SCNC: <1 MMOL/L (ref 3–14)
BUN SERPL-MCNC: 16 MG/DL (ref 7–30)
CALCIUM SERPL-MCNC: 9.2 MG/DL (ref 8.5–10.1)
CHLORIDE BLD-SCNC: 107 MMOL/L (ref 94–109)
CHOLEST SERPL-MCNC: 157 MG/DL
CO2 SERPL-SCNC: 33 MMOL/L (ref 20–32)
CREAT SERPL-MCNC: 0.9 MG/DL (ref 0.66–1.25)
FASTING STATUS PATIENT QL REPORTED: YES
GFR SERPL CREATININE-BSD FRML MDRD: >90 ML/MIN/1.73M2
GLUCOSE BLD-MCNC: 101 MG/DL (ref 70–99)
HDLC SERPL-MCNC: 58 MG/DL
LDLC SERPL CALC-MCNC: 71 MG/DL
NONHDLC SERPL-MCNC: 99 MG/DL
POTASSIUM BLD-SCNC: 3.6 MMOL/L (ref 3.4–5.3)
PSA SERPL-MCNC: 0.41 UG/L (ref 0–4)
SODIUM SERPL-SCNC: 140 MMOL/L (ref 133–144)
TRIGL SERPL-MCNC: 141 MG/DL

## 2022-05-20 RX ORDER — LISINOPRIL AND HYDROCHLOROTHIAZIDE 20; 25 MG/1; MG/1
1 TABLET ORAL EVERY MORNING
Qty: 90 TABLET | Refills: 2 | Status: SHIPPED | OUTPATIENT
Start: 2022-05-20 | End: 2023-03-12

## 2022-05-20 RX ORDER — ATORVASTATIN CALCIUM 40 MG/1
40 TABLET, FILM COATED ORAL DAILY
Qty: 90 TABLET | Refills: 2 | Status: SHIPPED | OUTPATIENT
Start: 2022-05-20 | End: 2023-03-14

## 2022-05-20 RX ORDER — METOPROLOL SUCCINATE 50 MG/1
50 TABLET, EXTENDED RELEASE ORAL DAILY
Qty: 90 TABLET | Refills: 2 | Status: SHIPPED | OUTPATIENT
Start: 2022-05-20 | End: 2023-03-12

## 2022-05-20 NOTE — RESULT ENCOUNTER NOTE
Hi Lev,  Your PSA (prostate cancer blood test) is normal and your lipid panel (cholesterol) results are great on the atorvastatin.  Your fasting blood sugar is borderline at 101 but the rest of your basic metabolic panel looks just fine.      Please continue with your current treatment plan and I should see you for an annual preventive visit next January.    Joaquina Goldman MD

## 2022-09-16 ENCOUNTER — MYC REFILL (OUTPATIENT)
Dept: FAMILY MEDICINE | Facility: CLINIC | Age: 52
End: 2022-09-16

## 2022-09-16 DIAGNOSIS — F40.298 ISOLATED OR SPECIFIC PHOBIA: ICD-10-CM

## 2022-09-16 DIAGNOSIS — F41.9 ANXIETY: ICD-10-CM

## 2022-09-16 RX ORDER — LORAZEPAM 0.5 MG/1
0.5 TABLET ORAL 2 TIMES DAILY PRN
Qty: 60 TABLET | Refills: 0 | Status: SHIPPED | OUTPATIENT
Start: 2022-09-16 | End: 2023-09-14

## 2022-09-16 NOTE — TELEPHONE ENCOUNTER
Routing refill request to provider for review/approval because:  Drug not on the [facility] refill protocol     Last Written Prescription Date:  3/3/21  Last Fill Quantity: 60#,  # refills: 0   Last office visit: 12/7/2021 with prescribing provider:  Susi   Future Office Visit:      Patient Comment: Dr. Goldman, I am happy to schedule an e-visit or in-person appt. so that I can get a refill of this prescription. It's been nearly 1.5 years since my last refill. Thanks,     ANGELICA Pratt RN  [facility]

## 2022-12-27 ENCOUNTER — OFFICE VISIT (OUTPATIENT)
Dept: URGENT CARE | Facility: URGENT CARE | Age: 52
End: 2022-12-27
Payer: COMMERCIAL

## 2022-12-27 ENCOUNTER — ANCILLARY PROCEDURE (OUTPATIENT)
Dept: GENERAL RADIOLOGY | Facility: CLINIC | Age: 52
End: 2022-12-27
Attending: FAMILY MEDICINE
Payer: COMMERCIAL

## 2022-12-27 VITALS
TEMPERATURE: 98.1 F | SYSTOLIC BLOOD PRESSURE: 120 MMHG | BODY MASS INDEX: 27.92 KG/M2 | DIASTOLIC BLOOD PRESSURE: 77 MMHG | HEART RATE: 77 BPM | HEIGHT: 70 IN | WEIGHT: 195 LBS | OXYGEN SATURATION: 97 %

## 2022-12-27 DIAGNOSIS — M79.642 PAIN OF LEFT HAND: ICD-10-CM

## 2022-12-27 DIAGNOSIS — S60.222A CONTUSION OF LEFT HAND, INITIAL ENCOUNTER: ICD-10-CM

## 2022-12-27 DIAGNOSIS — M79.642 PAIN OF LEFT HAND: Primary | ICD-10-CM

## 2022-12-27 PROCEDURE — 73130 X-RAY EXAM OF HAND: CPT | Mod: TC | Performed by: RADIOLOGY

## 2022-12-27 PROCEDURE — 99213 OFFICE O/P EST LOW 20 MIN: CPT | Performed by: FAMILY MEDICINE

## 2022-12-27 NOTE — PROGRESS NOTES
"    (M08.581) Pain of left hand  (primary encounter diagnosis)  Comment:   Plan: XR Hand Left G/E 3 Views            (S60.686A) Contusion of left hand, initial encounter  Comment:   Plan:       X-ray negative.  Patient advised to use ice, ibuprofen,  Gradually advance use.      CHIEF COMPLAINT    Left hand injury.      HISTORY    Patient was in her chill being on the snow and hit his left hand on some ice.  He has tenderness over the index metacarpal primarily as well as some swelling in this area.      REVIEW OF SYSTEMS    Unremarkable except as above.      EXAM  /77   Pulse 77   Temp 98.1  F (36.7  C) (Temporal)   Ht 1.778 m (5' 10\")   Wt 88.5 kg (195 lb)   SpO2 97%   BMI 27.98 kg/m      Left hand has mild to moderate swelling over the index metacarpal and tenderness in this area.  Finger range of motion is fairly normal.      Results for orders placed or performed in visit on 12/27/22   XR Hand Left G/E 3 Views     Status: None    Narrative    XR HAND LEFT G/E 3 VIEWS 12/27/2022 3:49 PM     HISTORY: hit on ice, pain over index metacarpal; Pain of left hand    COMPARISON: None.      Impression    IMPRESSION: No fractures are evident. Normal joint spacing and  alignment. The soft tissues are unremarkable.    KRUPA VASQUEZ MD         SYSTEM ID:  [id number]         "

## 2023-01-14 ENCOUNTER — HEALTH MAINTENANCE LETTER (OUTPATIENT)
Age: 53
End: 2023-01-14

## 2023-03-12 PROBLEM — D12.6 ADENOMATOUS POLYP OF COLON: Status: ACTIVE | Noted: 2018-11-27

## 2023-03-12 PROBLEM — Z86.0101 HISTORY OF ADENOMATOUS POLYP OF COLON: Status: ACTIVE | Noted: 2018-11-27

## 2023-03-13 ASSESSMENT — ENCOUNTER SYMPTOMS
SORE THROAT: 0
HEADACHES: 0
SHORTNESS OF BREATH: 0
HEMATURIA: 0
MYALGIAS: 0
HEARTBURN: 0
EYE PAIN: 0
NERVOUS/ANXIOUS: 0
ABDOMINAL PAIN: 0
DIZZINESS: 0
COUGH: 0
FREQUENCY: 0
FEVER: 0
JOINT SWELLING: 0
DYSURIA: 0
ARTHRALGIAS: 0
WEAKNESS: 0
PALPITATIONS: 0
NAUSEA: 0
DIARRHEA: 0
CONSTIPATION: 0
PARESTHESIAS: 0
CHILLS: 0
HEMATOCHEZIA: 0

## 2023-03-14 ENCOUNTER — OFFICE VISIT (OUTPATIENT)
Dept: FAMILY MEDICINE | Facility: CLINIC | Age: 53
End: 2023-03-14
Payer: COMMERCIAL

## 2023-03-14 VITALS
HEART RATE: 60 BPM | OXYGEN SATURATION: 100 % | DIASTOLIC BLOOD PRESSURE: 80 MMHG | BODY MASS INDEX: 29.18 KG/M2 | WEIGHT: 203.8 LBS | HEIGHT: 70 IN | TEMPERATURE: 98.2 F | SYSTOLIC BLOOD PRESSURE: 120 MMHG | RESPIRATION RATE: 16 BRPM

## 2023-03-14 DIAGNOSIS — Z00.00 ROUTINE GENERAL MEDICAL EXAMINATION AT A HEALTH CARE FACILITY: Primary | ICD-10-CM

## 2023-03-14 DIAGNOSIS — E78.2 MIXED HYPERLIPIDEMIA: ICD-10-CM

## 2023-03-14 DIAGNOSIS — Z23 NEED FOR VACCINATION: ICD-10-CM

## 2023-03-14 DIAGNOSIS — I10 HYPERTENSION GOAL BP (BLOOD PRESSURE) < 140/90: ICD-10-CM

## 2023-03-14 DIAGNOSIS — I25.10 CORONARY ARTERY DISEASE INVOLVING NATIVE CORONARY ARTERY OF NATIVE HEART WITHOUT ANGINA PECTORIS: ICD-10-CM

## 2023-03-14 DIAGNOSIS — F41.9 ANXIETY: ICD-10-CM

## 2023-03-14 DIAGNOSIS — F40.298 ISOLATED OR SPECIFIC PHOBIA: ICD-10-CM

## 2023-03-14 DIAGNOSIS — Z12.5 SCREENING FOR PROSTATE CANCER: ICD-10-CM

## 2023-03-14 LAB
ANION GAP SERPL CALCULATED.3IONS-SCNC: 14 MMOL/L (ref 7–15)
BUN SERPL-MCNC: 18.5 MG/DL (ref 6–20)
CALCIUM SERPL-MCNC: 9.6 MG/DL (ref 8.6–10)
CHLORIDE SERPL-SCNC: 106 MMOL/L (ref 98–107)
CHOLEST SERPL-MCNC: 116 MG/DL
CREAT SERPL-MCNC: 0.97 MG/DL (ref 0.67–1.17)
CREAT UR-MCNC: 285 MG/DL
DEPRECATED HCO3 PLAS-SCNC: 26 MMOL/L (ref 22–29)
GFR SERPL CREATININE-BSD FRML MDRD: >90 ML/MIN/1.73M2
GLUCOSE SERPL-MCNC: 99 MG/DL (ref 70–99)
HDLC SERPL-MCNC: 38 MG/DL
LDLC SERPL CALC-MCNC: 56 MG/DL
MICROALBUMIN UR-MCNC: 21.3 MG/L
MICROALBUMIN/CREAT UR: 7.47 MG/G CR (ref 0–17)
NONHDLC SERPL-MCNC: 78 MG/DL
POTASSIUM SERPL-SCNC: 4.2 MMOL/L (ref 3.4–5.3)
PSA SERPL DL<=0.01 NG/ML-MCNC: 0.31 NG/ML (ref 0–3.5)
SODIUM SERPL-SCNC: 146 MMOL/L (ref 136–145)
TRIGL SERPL-MCNC: 110 MG/DL

## 2023-03-14 PROCEDURE — 80061 LIPID PANEL: CPT | Performed by: FAMILY MEDICINE

## 2023-03-14 PROCEDURE — 36415 COLL VENOUS BLD VENIPUNCTURE: CPT | Performed by: FAMILY MEDICINE

## 2023-03-14 PROCEDURE — G0103 PSA SCREENING: HCPCS | Performed by: FAMILY MEDICINE

## 2023-03-14 PROCEDURE — 99396 PREV VISIT EST AGE 40-64: CPT | Performed by: FAMILY MEDICINE

## 2023-03-14 PROCEDURE — 99214 OFFICE O/P EST MOD 30 MIN: CPT | Mod: 25 | Performed by: FAMILY MEDICINE

## 2023-03-14 PROCEDURE — 80048 BASIC METABOLIC PNL TOTAL CA: CPT | Performed by: FAMILY MEDICINE

## 2023-03-14 PROCEDURE — 82570 ASSAY OF URINE CREATININE: CPT | Performed by: FAMILY MEDICINE

## 2023-03-14 PROCEDURE — 82043 UR ALBUMIN QUANTITATIVE: CPT | Performed by: FAMILY MEDICINE

## 2023-03-14 RX ORDER — ATORVASTATIN CALCIUM 40 MG/1
40 TABLET, FILM COATED ORAL DAILY
Qty: 90 TABLET | Refills: 3 | Status: SHIPPED | OUTPATIENT
Start: 2023-03-14 | End: 2024-03-06

## 2023-03-14 RX ORDER — ATORVASTATIN CALCIUM 40 MG/1
40 TABLET, FILM COATED ORAL DAILY
Qty: 90 TABLET | Refills: 2 | Status: CANCELLED | OUTPATIENT
Start: 2023-03-14

## 2023-03-14 RX ORDER — METOPROLOL SUCCINATE 50 MG/1
50 TABLET, EXTENDED RELEASE ORAL DAILY
Qty: 90 TABLET | Refills: 3 | Status: SHIPPED | OUTPATIENT
Start: 2023-03-14 | End: 2024-03-05

## 2023-03-14 RX ORDER — LISINOPRIL AND HYDROCHLOROTHIAZIDE 20; 25 MG/1; MG/1
1 TABLET ORAL EVERY MORNING
Qty: 90 TABLET | Refills: 3 | Status: SHIPPED | OUTPATIENT
Start: 2023-03-14 | End: 2024-03-05 | Stop reason: DRUGHIGH

## 2023-03-14 RX ORDER — LORAZEPAM 0.5 MG/1
0.5 TABLET ORAL 2 TIMES DAILY PRN
Qty: 60 TABLET | Refills: 0 | Status: CANCELLED | OUTPATIENT
Start: 2023-03-14

## 2023-03-14 ASSESSMENT — ENCOUNTER SYMPTOMS
FREQUENCY: 0
DIZZINESS: 0
SHORTNESS OF BREATH: 0
DIARRHEA: 0
NERVOUS/ANXIOUS: 0
ARTHRALGIAS: 0
EYE PAIN: 0
FEVER: 0
WEAKNESS: 0
PARESTHESIAS: 0
HEMATOCHEZIA: 0
HEMATURIA: 0
NAUSEA: 0
MYALGIAS: 0
HEADACHES: 0
DYSURIA: 0
COUGH: 0
SORE THROAT: 0
ABDOMINAL PAIN: 0
CONSTIPATION: 0
CHILLS: 0
JOINT SWELLING: 0
HEARTBURN: 0
PALPITATIONS: 0

## 2023-03-14 ASSESSMENT — PAIN SCALES - GENERAL: PAINLEVEL: NO PAIN (0)

## 2023-03-14 NOTE — PROGRESS NOTES
SUBJECTIVE:   CC: Lev is an 52 year old who presents for preventative health visit.   Patient has been advised of split billing requirements and indicates understanding: Yes     Healthy Habits:     Getting at least 3 servings of Calcium per day:  Yes    Bi-annual eye exam:  Yes    Dental care twice a year:  Yes    Sleep apnea or symptoms of sleep apnea:  None    Diet:  Regular (no restrictions)    Frequency of exercise:  6-7 days/week    Duration of exercise:  45-60 minutes    Taking medications regularly:  Yes    Medication side effects:  Not applicable    PHQ-2 Total Score: 0    Additional concerns today:  No    Tested positive for COVID.  Symptoms started 6 days ago and are very mild.      Today's PHQ-2 Score:   PHQ-2 ( 1999 Pfizer) 3/13/2023   Q1: Little interest or pleasure in doing things 0   Q2: Feeling down, depressed or hopeless 0   PHQ-2 Score 0   PHQ-2 Total Score (12-17 Years)- Positive if 3 or more points; Administer PHQ-A if positive -   Q1: Little interest or pleasure in doing things Not at all   Q2: Feeling down, depressed or hopeless Not at all   PHQ-2 Score 0         Social History     Tobacco Use     Smoking status: Never     Smokeless tobacco: Never   Substance Use Topics     Alcohol use: Yes     Comment: 4 Drinks per week         Alcohol Use 3/13/2023   Prescreen: >3 drinks/day or >7 drinks/week? No       Last PSA:   Prostate Specific Antigen Screen   Date Value Ref Range Status   05/18/2022 0.41 0.00 - 4.00 ug/L Final       Reviewed orders with patient. Reviewed health maintenance and updated orders accordingly - Yes  BP Readings from Last 3 Encounters:   03/14/23 120/80   12/27/22 120/77   02/07/22 131/79    Wt Readings from Last 3 Encounters:   03/14/23 92.4 kg (203 lb 12.8 oz)   12/27/22 88.5 kg (195 lb)   02/07/22 85.3 kg (188 lb)           Reviewed and updated as needed this visit by clinical staff   Tobacco  Allergies  Meds  Problems             Reviewed and updated as needed this  "visit by Provider     Meds  Problems            Past Medical History:   Diagnosis Date     Hypertension goal BP (blood pressure) < 140/90      Isolated or specific phobia     performance     Major depressive disorder, single episode, mild (H)      Non-obstructive CAD without angina 3/12/2019     Prothrombin gene mutation (H)      Stuttering       Past Surgical History:   Procedure Laterality Date     COLONOSCOPY  Oct. 2018     COLONOSCOPY N/A 2/7/2022    Procedure: COLONOSCOPY, WITH POLYPECTOMY;  Surgeon: Yane Gutierrez MD;  Location: [facility] [place] [facility] OR     NO HISTORY OF SURGERY         Review of Systems   Constitutional: Negative for chills and fever.   HENT: Negative for congestion, ear pain, hearing loss and sore throat.    Eyes: Negative for pain and visual disturbance.   Respiratory: Negative for cough and shortness of breath.    Cardiovascular: Negative for chest pain, palpitations and peripheral edema.   Gastrointestinal: Negative for abdominal pain, constipation, diarrhea, heartburn, hematochezia and nausea.   Genitourinary: Negative for dysuria, frequency, genital sores, hematuria, impotence, penile discharge and urgency.   Musculoskeletal: Negative for arthralgias, joint swelling and myalgias.   Skin: Negative for rash.   Neurological: Negative for dizziness, weakness, headaches and paresthesias.   Psychiatric/Behavioral: Negative for mood changes. The patient is not nervous/anxious.          OBJECTIVE:   /80 (BP Location: Right arm, Patient Position: Sitting, Cuff Size: Adult Regular)   Pulse 60   Temp 98.2  F (36.8  C) (Temporal)   Resp 16   Ht 1.778 m (5' 10\")   Wt 92.4 kg (203 lb 12.8 oz)   SpO2 100%   BMI 29.24 kg/m      Physical Exam  GENERAL APPEARANCE: healthy, alert and no distress  EYES: Eyes grossly normal to inspection, conjunctivae and sclerae normal  HENT: ear canals and TM's normal  NECK: no adenopathy, no asymmetry, masses, or scars and thyroid normal to palpation  RESP: lungs clear " to auscultation - no rales, rhonchi or wheezes  CV: regular rate and rhythm, normal S1 S2, no S3 or S4, no murmur, click or rub, no peripheral edema   ABDOMEN: soft, nontender, no hepatosplenomegaly, no masses   MS: no musculoskeletal defects are noted and gait is age appropriate without ataxia  SKIN: no suspicious lesions or rashes  NEURO: Normal strength and tone, sensory exam grossly normal, mentation intact and speech normal  PSYCH: mentation appears normal and affect normal/bright         ASSESSMENT/PLAN:     Routine general medical examination at a health care facility  Reviewed/updated Health Maintenance     Non-obstructive CAD without angina  Treating risk factors aggressively    Mixed hyperlipidemia  Continue statin.  Awaiting lipid results to determine if dose change is indicated.     - Lipid panel reflex to direct LDL Fasting    Hypertension goal BP (blood pressure) < 140/90  stable/well controlled - Continue current medication regimen.   - Basic metabolic panel  (Ca, Cl, CO2, Creat, Gluc, K, Na, BUN)  - Albumin Random Urine Quantitative with Creat Ratio  - lisinopril-hydrochlorothiazide (ZESTORETIC) 20-25 MG tablet  Dispense: 90 tablet; Refill: 3  - metoprolol succinate ER (TOPROL XL) 50 MG 24 hr tablet  Dispense: 90 tablet; Refill: 3    Isolated or specific phobia  Anxiety  He uses lorazepam sparingly.  Does not need refill at this time.  He'll submit refill request when he needs additional.      Screening for prostate cancer  - PSA, screen      COUNSELING:   Reviewed preventive health counseling, as reflected in patient instructions    He reports that he has never smoked. He has never used smokeless tobacco.      Joaquina Goldman MD  [facility]

## 2023-03-14 NOTE — RESULT ENCOUNTER NOTE
Hi Lev,  Your test results fall within the expected range(s) or remain unchanged from previous results.  Please continue with your current treatment plan.  I sent authorization to your pharmacy to fill the atorvastatin at the same dose for another year.    I do note that your HDL (good) cholesterol is lower than in prior years. Exercise is the main  of good cholesterol and I do wonder if your exercise routine may have dropped off during this long winter.  If so, consider ways to increase your physical activity as that may increase your HDL cholesterol level.    Your kidney function (creatinine and eGFR), PSA (prostate cancer blood test), and urine albumin (protein) level are all normal.  Joaquina Goldman MD

## 2023-03-14 NOTE — PATIENT INSTRUCTIONS
If you have MyChart:  1) I kindly request that you check your MyChart prior to all appointments with me and complete any assigned questionnaires ahead of time.    2) You may receive auto-released results from our system before I have the opportunity to review and comment.  Be assured I will review and comment on all of your results as soon as I can.        FYI:  My schedule has been booking out very far in advance (2 months).  I apologize for the lack of timely access.  If you need to be seen for a chronic condition or preventive (wellness) visit, please be sure to schedule that appointment 2-3 months in advance.  If you have a concern that you feel cannot wait until my next available appointment (such as a hospital follow-up or new symptom of concern) please ask to speak to one of the [facility] nurses who may be able to access a sooner appointment.    I do ask that all patients who are taking chronic medications for conditions that I am managing schedule an in-person visit with me at least once a year.     Patient Education    Preventive Health Recommendations  Male Ages 50 - 64    Yearly exam:             See your health care provider every year in order to  o   Review health changes.   o   Discuss preventive care.    o   Review your medicines if your doctor has prescribed any.   Have a cholesterol test every 5 years, or more frequently if you are at risk for high cholesterol/heart disease.   Have a diabetes test (fasting glucose) every three years. If you are at risk for diabetes, you should have this test more often.   Have a colonoscopy at age 50, or have a yearly FIT test (stool test). These exams will check for colon cancer.    Talk with your health care provider about whether or not a prostate cancer screening test (PSA) is right for you.  You should be tested each year for STDs (sexually transmitted diseases), if you re at risk.     Shots: Get a flu shot each year. Get a tetanus shot every 10 years.      Nutrition:  Eat at least 5 servings of fruits and vegetables daily.   Eat whole-grain bread, whole-wheat pasta and brown rice instead of white grains and rice.   Get adequate Calcium and Vitamin D.     Lifestyle  Exercise for at least 150 minutes a week (30 minutes a day, 5 days a week). This will help you control your weight and prevent disease.   Limit alcohol to one drink per day.   No smoking.   Wear sunscreen to prevent skin cancer.   See your dentist every six months for an exam and cleaning.   See your eye doctor every 1 to 2 years.

## 2023-09-14 ENCOUNTER — MYC REFILL (OUTPATIENT)
Dept: FAMILY MEDICINE | Facility: CLINIC | Age: 53
End: 2023-09-14
Payer: COMMERCIAL

## 2023-09-14 DIAGNOSIS — F41.9 ANXIETY: ICD-10-CM

## 2023-09-14 DIAGNOSIS — F40.298 ISOLATED OR SPECIFIC PHOBIA: ICD-10-CM

## 2023-09-15 RX ORDER — LORAZEPAM 0.5 MG/1
0.5 TABLET ORAL 2 TIMES DAILY PRN
Qty: 60 TABLET | Refills: 0 | Status: SHIPPED | OUTPATIENT
Start: 2023-09-15 | End: 2024-07-26

## 2023-11-21 ENCOUNTER — PATIENT OUTREACH (OUTPATIENT)
Dept: GASTROENTEROLOGY | Facility: CLINIC | Age: 53
End: 2023-11-21
Payer: COMMERCIAL

## 2024-03-03 PROBLEM — I25.10 CORONARY ARTERY DISEASE INVOLVING NATIVE CORONARY ARTERY OF NATIVE HEART WITHOUT ANGINA PECTORIS: Status: ACTIVE | Noted: 2019-03-12

## 2024-03-04 SDOH — HEALTH STABILITY: PHYSICAL HEALTH: ON AVERAGE, HOW MANY MINUTES DO YOU ENGAGE IN EXERCISE AT THIS LEVEL?: 50 MIN

## 2024-03-04 SDOH — HEALTH STABILITY: PHYSICAL HEALTH: ON AVERAGE, HOW MANY DAYS PER WEEK DO YOU ENGAGE IN MODERATE TO STRENUOUS EXERCISE (LIKE A BRISK WALK)?: 5 DAYS

## 2024-03-04 ASSESSMENT — SOCIAL DETERMINANTS OF HEALTH (SDOH): HOW OFTEN DO YOU GET TOGETHER WITH FRIENDS OR RELATIVES?: ONCE A WEEK

## 2024-03-05 ENCOUNTER — OFFICE VISIT (OUTPATIENT)
Dept: FAMILY MEDICINE | Facility: CLINIC | Age: 54
End: 2024-03-05
Payer: COMMERCIAL

## 2024-03-05 VITALS
TEMPERATURE: 97.3 F | SYSTOLIC BLOOD PRESSURE: 104 MMHG | WEIGHT: 205.8 LBS | BODY MASS INDEX: 29.46 KG/M2 | HEIGHT: 70 IN | HEART RATE: 62 BPM | DIASTOLIC BLOOD PRESSURE: 74 MMHG | RESPIRATION RATE: 16 BRPM | OXYGEN SATURATION: 96 %

## 2024-03-05 DIAGNOSIS — Z80.42 FAMILY HISTORY OF PROSTATE CANCER: ICD-10-CM

## 2024-03-05 DIAGNOSIS — Z01.84 IMMUNITY STATUS TESTING: ICD-10-CM

## 2024-03-05 DIAGNOSIS — I25.10 CORONARY ARTERY CALCIFICATION: ICD-10-CM

## 2024-03-05 DIAGNOSIS — R73.01 ELEVATED FASTING GLUCOSE: ICD-10-CM

## 2024-03-05 DIAGNOSIS — Z12.5 SCREENING FOR PROSTATE CANCER: ICD-10-CM

## 2024-03-05 DIAGNOSIS — Z00.00 ROUTINE GENERAL MEDICAL EXAMINATION AT A HEALTH CARE FACILITY: Primary | ICD-10-CM

## 2024-03-05 DIAGNOSIS — I10 HYPERTENSION GOAL BP (BLOOD PRESSURE) < 140/90: ICD-10-CM

## 2024-03-05 DIAGNOSIS — E78.2 MIXED HYPERLIPIDEMIA: ICD-10-CM

## 2024-03-05 PROCEDURE — 87340 HEPATITIS B SURFACE AG IA: CPT | Performed by: FAMILY MEDICINE

## 2024-03-05 PROCEDURE — 99214 OFFICE O/P EST MOD 30 MIN: CPT | Mod: 25 | Performed by: FAMILY MEDICINE

## 2024-03-05 PROCEDURE — 86706 HEP B SURFACE ANTIBODY: CPT | Performed by: FAMILY MEDICINE

## 2024-03-05 PROCEDURE — 80061 LIPID PANEL: CPT | Performed by: FAMILY MEDICINE

## 2024-03-05 PROCEDURE — 99396 PREV VISIT EST AGE 40-64: CPT | Performed by: FAMILY MEDICINE

## 2024-03-05 PROCEDURE — G0103 PSA SCREENING: HCPCS | Performed by: FAMILY MEDICINE

## 2024-03-05 PROCEDURE — 86708 HEPATITIS A ANTIBODY: CPT | Performed by: FAMILY MEDICINE

## 2024-03-05 PROCEDURE — 36415 COLL VENOUS BLD VENIPUNCTURE: CPT | Performed by: FAMILY MEDICINE

## 2024-03-05 PROCEDURE — 80048 BASIC METABOLIC PNL TOTAL CA: CPT | Performed by: FAMILY MEDICINE

## 2024-03-05 RX ORDER — ATORVASTATIN CALCIUM 40 MG/1
40 TABLET, FILM COATED ORAL DAILY
Qty: 90 TABLET | Refills: 3 | Status: CANCELLED | OUTPATIENT
Start: 2024-03-05

## 2024-03-05 RX ORDER — LISINOPRIL/HYDROCHLOROTHIAZIDE 10-12.5 MG
1 TABLET ORAL DAILY
Qty: 90 TABLET | Refills: 0 | Status: SHIPPED | OUTPATIENT
Start: 2024-03-05 | End: 2024-05-01 | Stop reason: DRUGHIGH

## 2024-03-05 RX ORDER — METOPROLOL SUCCINATE 50 MG/1
50 TABLET, EXTENDED RELEASE ORAL DAILY
Qty: 90 TABLET | Refills: 3 | Status: SHIPPED | OUTPATIENT
Start: 2024-03-05

## 2024-03-05 RX ORDER — LISINOPRIL AND HYDROCHLOROTHIAZIDE 20; 25 MG/1; MG/1
1 TABLET ORAL EVERY MORNING
Qty: 90 TABLET | Refills: 3 | Status: CANCELLED | OUTPATIENT
Start: 2024-03-05

## 2024-03-05 NOTE — PATIENT INSTRUCTIONS
HR=73 bpm, WPUB=500/59 mmhg, SpO2=97.0 %, Resp=12 B/min, EtCO2=34 mmHg, Apnea=2 Seconds, Pain=0, Rodriguez=2 Preventive Care Advice   This is general advice given by our system to help you stay healthy. However, your care team may have specific advice just for you. Please talk to your care team about your preventive care needs.  Nutrition  Eat 5 or more servings of fruits and vegetables each day.  Try wheat bread, brown rice and whole grain pasta (instead of white bread, rice, and pasta).  Get enough calcium and vitamin D. Check the label on foods and aim for 100% of the RDA (recommended daily allowance).  Lifestyle  Exercise at least 150 minutes each week   (30 minutes a day, 5 days a week).  Do muscle strengthening activities 2 days a week. These help control your weight and prevent disease.  No smoking.  Wear sunscreen to prevent skin cancer.  Have a dental exam and cleaning every 6 months.  Yearly exams  See your health care team every year to talk about:  Any changes in your health.  Any medicines your care team has prescribed.  Preventive care, family planning, and ways to prevent chronic diseases.  Shots (vaccines)   HPV shots (up to age 26), if you've never had them before.  Hepatitis B shots (up to age 59), if you've never had them before.  COVID-19 shot: Get this shot when it's due.  Flu shot: Get a flu shot every year.  Tetanus shot: Get a tetanus shot every 10 years.  Pneumococcal, hepatitis A, and RSV shots: Ask your care team if you need these based on your risk.  Shingles shot (for age 50 and up).  General health tests  Diabetes screening:  Starting at age 35, Get screened for diabetes at least every 3 years.  If you are younger than age 35, ask your care team if you should be screened for diabetes.  Cholesterol test: At age 39, start having a cholesterol test every 5 years, or more often if advised.  Bone density scan (DEXA): At age 50, ask your care team if you should have this scan for osteoporosis (brittle bones).  Hepatitis C: Get tested at least once in your life.  STIs (sexually transmitted  infections)  Before age 24: Ask your care team if you should be screened for STIs.  After age 24: Get screened for STIs if you're at risk. You are at risk for STIs (including HIV) if:  You are sexually active with more than one person.  You don't use condoms every time.  You or a partner was diagnosed with a sexually transmitted infection.  If you are at risk for HIV, ask about PrEP medicine to prevent HIV.  Get tested for HIV at least once in your life, whether you are at risk for HIV or not.  Cancer screening tests  Cervical cancer screening: If you have a cervix, begin getting regular cervical cancer screening tests at age 21. Most people who have regular screenings with normal results can stop after age 65. Talk about this with your provider.  Breast cancer scan (mammogram): If you've ever had breasts, begin having regular mammograms starting at age 40. This is a scan to check for breast cancer.  Colon cancer screening: It is important to start screening for colon cancer at age 45.  Have a colonoscopy test every 10 years (or more often if you're at risk) Or, ask your provider about stool tests like a FIT test every year or Cologuard test every 3 years.  To learn more about your testing options, visit: https://www.iGlue/333924.pdf.  For help making a decision, visit: https://bit.ly/qa93417.  Prostate cancer screening test: If you have a prostate and are age 55 to 69, ask your provider if you would benefit from a yearly prostate cancer screening test.  Lung cancer screening: If you are a current or former smoker age 50 to 80, ask your care team if ongoing lung cancer screenings are right for you.  For informational purposes only. Not to replace the advice of your health care provider. Copyright   2023 [employer]. All rights reserved. Clinically reviewed by the [facility] Transitions Program. The [employer] 534298 - REV 01/24.

## 2024-03-05 NOTE — PROGRESS NOTES
Preventive Care Visit  [facility]  Joaquina Goldman MD, Family Medicine  Mar 5, 2024    Assessment & Plan     Routine general medical examination at a health care facility  Reviewed/updated Health Maintenance   - Adult Dermatology  Referral    Hypertension goal BP (blood pressure) < 140/90  stable/well controlled.  We'll decrease lisinopril-HCTZ dose from 20-25 to 10-12.5 daily.  I asked him to monitor his blood pressure for the next several weeks and notify me if it is > 140/90  - BASIC METABOLIC PANEL  - metoprolol succinate ER (TOPROL XL) 50 MG 24 hr tablet  Dispense: 90 tablet; Refill: 3  - lisinopril-hydrochlorothiazide (ZESTORETIC) 10-12.5 MG tablet  Dispense: 90 tablet; Refill: 0  - BASIC METABOLIC PANEL    Coronary artery calcification  Mixed hyperlipidemia  Continue statin.  Awaiting lipid results to determine if dose change is indicated.     - Lipid panel reflex to direct LDL Fasting    Screening for prostate cancer  Family history of prostate cancer  - PSA, screen    Immunity status testing  He thinks he may have had viral hepatitis vaccines in the 90's.    - Hepatitis A Antibody Total  - Hepatitis B Surface Antibody  - Hepatitis B surface antigen      Counseling  Appropriate preventive services were discussed with this patient, including applicable screening as appropriate for fall prevention, nutrition, physical activity, Tobacco-use cessation, weight loss and cognition.  Checklist reviewing preventive services available has been given to the patient.  Reviewed patient's diet, addressing concerns and/or questions.     See Patient Instructions    Patricia Ohara is a 53 year old, presenting for the following:  Annual Visit        3/5/2024     7:41 AM   Additional Questions   Roomed by CYRUS RN        Health Care Directive  Patient does not have a Health Care Directive or Living Will: Patient states has Advance Directive and will bring in a copy to  clinic.    HPI    HTN - He does not check blood pressure at home.   BP Readings from Last 3 Encounters:   03/05/24 104/74   03/14/23 120/80   12/27/22 120/77           3/4/2024   General Health   How would you rate your overall physical health? Excellent   Feel stress (tense, anxious, or unable to sleep) Only a little   (!) STRESS CONCERN      3/4/2024   Nutrition   Three or more servings of calcium each day? Yes   Diet: Regular (no restrictions)   How many servings of fruit and vegetables per day? (!) 2-3   How many sweetened beverages each day? 0-1         3/4/2024   Exercise   Days per week of moderate/strenous exercise 5 days   Average minutes spent exercising at this level 50 min         3/4/2024   Social Factors   Frequency of gathering with friends or relatives Once a week   Worry food won't last until get money to buy more No   Food not last or not have enough money for food? No   Do you have housing?  Yes   Are you worried about losing your housing? No   Lack of transportation? No   Unable to get utilities (heat,electricity)? No         3/4/2024   Fall Risk   Fallen 2 or more times in the past year? No   Trouble with walking or balance? No          3/4/2024   Dental   Dentist two times every year? Yes         3/4/2024   TB Screening   Were you born outside of [place]?  No         Today's PHQ-2 Score:       3/4/2024    11:12 AM   PHQ-2 ( 1999 Pfizer)   Q1: Little interest or pleasure in doing things 0   Q2: Feeling down, depressed or hopeless 0   PHQ-2 Score 0   Q1: Little interest or pleasure in doing things Not at all   Q2: Feeling down, depressed or hopeless Not at all   PHQ-2 Score 0           3/4/2024   Substance Use   Alcohol more than 3/day or more than 7/wk No   Do you use any other substances recreationally? (!) ALCOHOL     Social History     Tobacco Use    Smoking status: Never    Smokeless tobacco: Never   Vaping Use    Vaping Use: Never used   Substance Use Topics    Alcohol use: Yes     Comment: 4  Drinks per week    Drug use: No           3/4/2024   STI Screening   New sexual partner(s) since last STI/HIV test? No     Last PSA:   Prostate Specific Antigen Screen   Date Value Ref Range Status   03/14/2023 0.31 0.00 - 3.50 ng/mL Final   05/18/2022 0.41 0.00 - 4.00 ug/L Final     ASCVD Risk   The ASCVD Risk score (Helder UGALDE, et al., 2019) failed to calculate for the following reasons:    The valid total cholesterol range is 130 to 320 mg/dL           Reviewed and updated as needed this visit by Provider   Tobacco  Allergies  Meds  Problems  Med Hx  Surg Hx  Fam Hx            BP Readings from Last 3 Encounters:   03/05/24 104/74   03/14/23 120/80   12/27/22 120/77    Wt Readings from Last 3 Encounters:   03/05/24 93.4 kg (205 lb 12.8 oz)   03/14/23 92.4 kg (203 lb 12.8 oz)   12/27/22 88.5 kg (195 lb)                  Patient Active Problem List   Diagnosis    Isolated or specific phobia    Hypertension goal BP (blood pressure) < 140/90    Anxiety    History of adenomatous polyp of colon    Diverticulosis of large intestine    Coronary artery calcification    Prothrombin gene mutation (H24)    Mixed hyperlipidemia     Past Surgical History:   Procedure Laterality Date    COLONOSCOPY  Oct. 2018    COLONOSCOPY N/A 2/7/2022    Procedure: COLONOSCOPY, WITH POLYPECTOMY;  Surgeon: Yane Gutierrez MD;  Location: [facility] OR    NO HISTORY OF SURGERY         Social History     Tobacco Use    Smoking status: Never    Smokeless tobacco: Never   Substance Use Topics    Alcohol use: Yes     Comment: 4 Drinks per week     Family History   Problem Relation Age of Onset    Skin Cancer Mother     Heart Disease Father 63        CAB x4 at age 63    Genetic Disease Father     Hypertension Sister     Breast Cancer Sister 48    Prostate Cancer Maternal Uncle               Objective    Exam  /74 (BP Location: Right arm, Patient Position: Sitting, Cuff Size: Adult Large)   Pulse 62   Temp 97.3  F (36.3  C) (Temporal)  "  Resp 16   Ht 1.789 m (5' 10.43\")   Wt 93.4 kg (205 lb 12.8 oz)   SpO2 96%   BMI 29.17 kg/m     Estimated body mass index is 29.17 kg/m  as calculated from the following:    Height as of this encounter: 1.789 m (5' 10.43\").    Weight as of this encounter: 93.4 kg (205 lb 12.8 oz).    Physical Exam  GENERAL: healthy, alert and no distress  EYES: Eyes grossly normal to inspection, conjunctivae and sclerae normal  HENT: ear canals and TM's normal  NECK: no adenopathy, no asymmetry, masses, or scars and thyroid normal to palpation  RESP: lungs clear to auscultation - no rales, rhonchi or wheezes  CV: regular rate and rhythm, normal S1 S2, no S3 or S4, no murmur, click or rub  ABDOMEN: soft, nontender, no hepatosplenomegaly, no masses  MS: no gross musculoskeletal defects noted, no edema  SKIN: no suspicious lesions or rashes  NEURO: Grossly normal strength and tone, mentation intact and speech normal  PSYCH: mentation appears normal, affect normal/bright      Signed Electronically by: Joaquina Goldman MD    "

## 2024-03-06 PROBLEM — R73.01 ELEVATED FASTING GLUCOSE: Status: ACTIVE | Noted: 2024-03-06

## 2024-03-06 LAB
ANION GAP SERPL CALCULATED.3IONS-SCNC: 11 MMOL/L (ref 7–15)
BUN SERPL-MCNC: 11.9 MG/DL (ref 6–20)
CALCIUM SERPL-MCNC: 10.1 MG/DL (ref 8.6–10)
CHLORIDE SERPL-SCNC: 102 MMOL/L (ref 98–107)
CHOLEST SERPL-MCNC: 115 MG/DL
CREAT SERPL-MCNC: 1.01 MG/DL (ref 0.67–1.17)
DEPRECATED HCO3 PLAS-SCNC: 28 MMOL/L (ref 22–29)
EGFRCR SERPLBLD CKD-EPI 2021: 88 ML/MIN/1.73M2
FASTING STATUS PATIENT QL REPORTED: YES
GLUCOSE SERPL-MCNC: 111 MG/DL (ref 70–99)
HAV AB SER QL IA: NONREACTIVE
HBV SURFACE AB SERPL IA-ACNC: <3.5 M[IU]/ML
HBV SURFACE AB SERPL IA-ACNC: NONREACTIVE M[IU]/ML
HBV SURFACE AG SERPL QL IA: NONREACTIVE
HDLC SERPL-MCNC: 46 MG/DL
LDLC SERPL CALC-MCNC: 47 MG/DL
NONHDLC SERPL-MCNC: 69 MG/DL
POTASSIUM SERPL-SCNC: 3.9 MMOL/L (ref 3.4–5.3)
PSA SERPL DL<=0.01 NG/ML-MCNC: 0.41 NG/ML (ref 0–3.5)
SODIUM SERPL-SCNC: 141 MMOL/L (ref 135–145)
TRIGL SERPL-MCNC: 109 MG/DL

## 2024-03-06 RX ORDER — ATORVASTATIN CALCIUM 40 MG/1
40 TABLET, FILM COATED ORAL DAILY
Qty: 90 TABLET | Refills: 3 | Status: SHIPPED | OUTPATIENT
Start: 2024-03-06

## 2024-03-07 NOTE — RESULT ENCOUNTER NOTE
Hi Lev,  Your fasting blood sugar is slightly elevated.      The rest of your labs were within acceptable limits.  Minor highs/lows were noted which are not clinically significant.  This includes PSA (prostate cancer blood test) and basic metabolic panel results (blood salts, blood sugar, and kidney function).  Your lipid panel (cholesterol) results are fantastic - keep taking the atorvastatin.      Your viral hepatitis tests show that you have no protective antibodies against either Hepatitis A or Hepatitis B.  This means you are a candidate for the Twinrix vaccine series (protecting against Hep A and Hep B).    For blood sugar control try cutting back on your carbohydrate intake:  Eat less sweets, bread, pasta, rice, and potatoes and more fruit, vegetables, and lean protein.  Also try taking a brief walk after meals.  This can also help control blood sugar and decrease the risk of developing diabetes.   Joauqina Goldman MD

## 2024-03-19 DIAGNOSIS — I10 HYPERTENSION GOAL BP (BLOOD PRESSURE) < 140/90: ICD-10-CM

## 2024-03-19 RX ORDER — LISINOPRIL/HYDROCHLOROTHIAZIDE 10-12.5 MG
1 TABLET ORAL DAILY
Qty: 90 TABLET | Refills: 0 | OUTPATIENT
Start: 2024-03-19

## 2024-04-29 ENCOUNTER — MYC MEDICAL ADVICE (OUTPATIENT)
Dept: FAMILY MEDICINE | Facility: CLINIC | Age: 54
End: 2024-04-29
Payer: COMMERCIAL

## 2024-04-29 DIAGNOSIS — I10 HYPERTENSION GOAL BP (BLOOD PRESSURE) < 140/90: Primary | ICD-10-CM

## 2024-05-01 RX ORDER — LISINOPRIL AND HYDROCHLOROTHIAZIDE 20; 25 MG/1; MG/1
1 TABLET ORAL DAILY
Qty: 90 TABLET | Refills: 2 | Status: SHIPPED | OUTPATIENT
Start: 2024-05-01

## 2024-05-01 NOTE — TELEPHONE ENCOUNTER
Dr. Goldman: pended increased/requested dose; home BP entered    You'll remember that you switched me from a 20-25 tablet to a 10-12.5 MG tablet at my last physical in March. After that appointment, I used up all my 20-25 MG medication and about two weeks ago I switched to the lower dosage. I know it takes a while for the body to adjust to the new dosage, but I've noticed a difference both in terms of blood pressure and how I feel. My blood pressure has been elevated, for example, this morning it was 135/72. But I have also felt the higher blood pressure in my body: a familiar feeling that I haven't felt for a while.  I would like to return to the previous dosage. I felt good with it. Would it be OK in the meantime just to take two 10-12.5 tablets in advance of getting the new prescription?    AUGUSTINE [facility]

## 2024-05-23 NOTE — ANESTHESIA CARE TRANSFER NOTE
Patient: Lev Copeland    Procedure: Procedure(s):  COLONOSCOPY, WITH POLYPECTOMY       Diagnosis: Screen for colon cancer [Z12.11]  Diagnosis Additional Information: No value filed.    Anesthesia Type:   MAC     Note:    Oropharynx: spontaneously breathing  Level of Consciousness: drowsy  Oxygen Supplementation: room air    Independent Airway: airway patency satisfactory and stable  Dentition: dentition unchanged  Vital Signs Stable: post-procedure vital signs reviewed and stable  Report to RN Given: handoff report given  Patient transferred to: Phase II    Handoff Report: Identifed the Patient, Identified the Reponsible Provider, Reviewed the pertinent medical history, Discussed the surgical course, Reviewed Intra-OP anesthesia mangement and issues during anesthesia, Set expectations for post-procedure period and Allowed opportunity for questions and acknowledgement of understanding      Vitals:  Vitals Value Taken Time   /79 02/07/22 1006   Temp 36.2  C (97.1  F) 02/07/22 1006   Pulse 52 02/07/22 1006   Resp 18 02/07/22 1006   SpO2 98 % 02/07/22 1006       Electronically Signed By: STEVE Maier CRNA  February 7, 2022  10:08 AM  
Yes, get tested

## 2024-07-26 ENCOUNTER — MYC REFILL (OUTPATIENT)
Dept: FAMILY MEDICINE | Facility: CLINIC | Age: 54
End: 2024-07-26
Payer: COMMERCIAL

## 2024-07-26 DIAGNOSIS — F40.298 ISOLATED OR SPECIFIC PHOBIA: ICD-10-CM

## 2024-07-26 DIAGNOSIS — F41.9 ANXIETY: ICD-10-CM

## 2024-07-26 RX ORDER — LORAZEPAM 0.5 MG/1
0.5 TABLET ORAL 2 TIMES DAILY PRN
Qty: 60 TABLET | Refills: 0 | Status: SHIPPED | OUTPATIENT
Start: 2024-07-26

## 2025-02-22 DIAGNOSIS — I10 HYPERTENSION GOAL BP (BLOOD PRESSURE) < 140/90: ICD-10-CM

## 2025-02-24 RX ORDER — LISINOPRIL AND HYDROCHLOROTHIAZIDE 20; 25 MG/1; MG/1
1 TABLET ORAL DAILY
Qty: 90 TABLET | Refills: 2 | Status: SHIPPED | OUTPATIENT
Start: 2025-02-24

## 2025-03-02 ENCOUNTER — MYC REFILL (OUTPATIENT)
Dept: FAMILY MEDICINE | Facility: CLINIC | Age: 55
End: 2025-03-02
Payer: COMMERCIAL

## 2025-03-02 DIAGNOSIS — I10 HYPERTENSION GOAL BP (BLOOD PRESSURE) < 140/90: ICD-10-CM

## 2025-03-02 RX ORDER — METOPROLOL SUCCINATE 50 MG/1
50 TABLET, EXTENDED RELEASE ORAL DAILY
Qty: 90 TABLET | Refills: 3 | OUTPATIENT
Start: 2025-03-02

## 2025-03-02 RX ORDER — LISINOPRIL AND HYDROCHLOROTHIAZIDE 20; 25 MG/1; MG/1
1 TABLET ORAL DAILY
Qty: 90 TABLET | Refills: 2 | OUTPATIENT
Start: 2025-03-02

## 2025-03-02 RX ORDER — METOPROLOL SUCCINATE 50 MG/1
50 TABLET, EXTENDED RELEASE ORAL DAILY
Qty: 90 TABLET | Refills: 0 | Status: SHIPPED | OUTPATIENT
Start: 2025-03-02

## 2025-03-09 SDOH — HEALTH STABILITY: PHYSICAL HEALTH: ON AVERAGE, HOW MANY DAYS PER WEEK DO YOU ENGAGE IN MODERATE TO STRENUOUS EXERCISE (LIKE A BRISK WALK)?: 5 DAYS

## 2025-03-09 SDOH — HEALTH STABILITY: PHYSICAL HEALTH: ON AVERAGE, HOW MANY MINUTES DO YOU ENGAGE IN EXERCISE AT THIS LEVEL?: 50 MIN

## 2025-03-09 ASSESSMENT — SOCIAL DETERMINANTS OF HEALTH (SDOH): HOW OFTEN DO YOU GET TOGETHER WITH FRIENDS OR RELATIVES?: ONCE A WEEK

## 2025-03-11 ENCOUNTER — OFFICE VISIT (OUTPATIENT)
Dept: FAMILY MEDICINE | Facility: CLINIC | Age: 55
End: 2025-03-11
Attending: FAMILY MEDICINE
Payer: COMMERCIAL

## 2025-03-11 VITALS
TEMPERATURE: 97 F | HEART RATE: 69 BPM | WEIGHT: 209.3 LBS | BODY MASS INDEX: 29.66 KG/M2 | SYSTOLIC BLOOD PRESSURE: 115 MMHG | OXYGEN SATURATION: 96 % | DIASTOLIC BLOOD PRESSURE: 79 MMHG | RESPIRATION RATE: 18 BRPM

## 2025-03-11 DIAGNOSIS — Z00.00 ROUTINE GENERAL MEDICAL EXAMINATION AT A HEALTH CARE FACILITY: Primary | ICD-10-CM

## 2025-03-11 DIAGNOSIS — Z23 NEED FOR VACCINATION: ICD-10-CM

## 2025-03-11 DIAGNOSIS — I25.10 CORONARY ARTERY CALCIFICATION: ICD-10-CM

## 2025-03-11 DIAGNOSIS — E78.2 MIXED HYPERLIPIDEMIA: ICD-10-CM

## 2025-03-11 DIAGNOSIS — I10 HYPERTENSION GOAL BP (BLOOD PRESSURE) < 140/90: ICD-10-CM

## 2025-03-11 LAB
CHOLEST SERPL-MCNC: 132 MG/DL
FASTING STATUS PATIENT QL REPORTED: YES
HDLC SERPL-MCNC: 48 MG/DL
LDLC SERPL CALC-MCNC: 59 MG/DL
NONHDLC SERPL-MCNC: 84 MG/DL
TRIGL SERPL-MCNC: 124 MG/DL

## 2025-03-11 PROCEDURE — G2211 COMPLEX E/M VISIT ADD ON: HCPCS | Performed by: FAMILY MEDICINE

## 2025-03-11 PROCEDURE — 36415 COLL VENOUS BLD VENIPUNCTURE: CPT | Performed by: FAMILY MEDICINE

## 2025-03-11 PROCEDURE — 3074F SYST BP LT 130 MM HG: CPT | Performed by: FAMILY MEDICINE

## 2025-03-11 PROCEDURE — 90636 HEP A/HEP B VACC ADULT IM: CPT | Performed by: FAMILY MEDICINE

## 2025-03-11 PROCEDURE — 1126F AMNT PAIN NOTED NONE PRSNT: CPT | Performed by: FAMILY MEDICINE

## 2025-03-11 PROCEDURE — 3078F DIAST BP <80 MM HG: CPT | Performed by: FAMILY MEDICINE

## 2025-03-11 PROCEDURE — 80048 BASIC METABOLIC PNL TOTAL CA: CPT | Performed by: FAMILY MEDICINE

## 2025-03-11 PROCEDURE — 90471 IMMUNIZATION ADMIN: CPT | Performed by: FAMILY MEDICINE

## 2025-03-11 PROCEDURE — 99396 PREV VISIT EST AGE 40-64: CPT | Performed by: FAMILY MEDICINE

## 2025-03-11 PROCEDURE — 80061 LIPID PANEL: CPT | Performed by: FAMILY MEDICINE

## 2025-03-11 PROCEDURE — 90472 IMMUNIZATION ADMIN EACH ADD: CPT | Performed by: FAMILY MEDICINE

## 2025-03-11 PROCEDURE — 90677 PCV20 VACCINE IM: CPT | Performed by: FAMILY MEDICINE

## 2025-03-11 PROCEDURE — 99214 OFFICE O/P EST MOD 30 MIN: CPT | Mod: 25 | Performed by: FAMILY MEDICINE

## 2025-03-11 RX ORDER — LISINOPRIL AND HYDROCHLOROTHIAZIDE 20; 25 MG/1; MG/1
1 TABLET ORAL DAILY
Qty: 90 TABLET | Refills: 3 | Status: SHIPPED | OUTPATIENT
Start: 2025-03-11

## 2025-03-11 RX ORDER — METOPROLOL SUCCINATE 50 MG/1
50 TABLET, EXTENDED RELEASE ORAL DAILY
Qty: 90 TABLET | Refills: 3 | Status: SHIPPED | OUTPATIENT
Start: 2025-03-11

## 2025-03-11 RX ORDER — ATORVASTATIN CALCIUM 40 MG/1
40 TABLET, FILM COATED ORAL DAILY
Qty: 90 TABLET | Refills: 3 | Status: CANCELLED | OUTPATIENT
Start: 2025-03-11

## 2025-03-11 ASSESSMENT — PAIN SCALES - GENERAL: PAINLEVEL_OUTOF10: NO PAIN (0)

## 2025-03-11 NOTE — PATIENT INSTRUCTIONS
Patient Education   Preventive Care Advice   This is general advice given by our system to help you stay healthy. However, your care team may have specific advice just for you. Please talk to your care team about your preventive care needs.  Nutrition  Eat 5 or more servings of fruits and vegetables each day.  Try wheat bread, brown rice and whole grain pasta (instead of white bread, rice, and pasta).  Get enough calcium and vitamin D. Check the label on foods and aim for 100% of the RDA (recommended daily allowance).  Lifestyle  Exercise at least 150 minutes each week  (30 minutes a day, 5 days a week).  Do muscle strengthening activities 2 days a week. These help control your weight and prevent disease.  No smoking.  Wear sunscreen to prevent skin cancer.  Have a dental exam and cleaning every 6 months.  Yearly exams  See your health care team every year to talk about:  Any changes in your health.  Any medicines your care team has prescribed.  Preventive care, family planning, and ways to prevent chronic diseases.  Shots (vaccines)   HPV shots (up to age 26), if you've never had them before.  Hepatitis B shots (up to age 59), if you've never had them before.  COVID-19 shot: Get this shot when it's due.  Flu shot: Get a flu shot every year.  Tetanus shot: Get a tetanus shot every 10 years.  Pneumococcal, hepatitis A, and RSV shots: Ask your care team if you need these based on your risk.  Shingles shot (for age 50 and up)  General health tests  Diabetes screening:  Starting at age 35, Get screened for diabetes at least every 3 years.  If you are younger than age 35, ask your care team if you should be screened for diabetes.  Cholesterol test: At age 39, start having a cholesterol test every 5 years, or more often if advised.  Bone density scan (DEXA): At age 50, ask your care team if you should have this scan for osteoporosis (brittle bones).  Hepatitis C: Get tested at least once in your life.  STIs (sexually  transmitted infections)  Before age 24: Ask your care team if you should be screened for STIs.  After age 24: Get screened for STIs if you're at risk. You are at risk for STIs (including HIV) if:  You are sexually active with more than one person.  You don't use condoms every time.  You or a partner was diagnosed with a sexually transmitted infection.  If you are at risk for HIV, ask about PrEP medicine to prevent HIV.  Get tested for HIV at least once in your life, whether you are at risk for HIV or not.  Cancer screening tests  Cervical cancer screening: If you have a cervix, begin getting regular cervical cancer screening tests starting at age 21.  Breast cancer scan (mammogram): If you've ever had breasts, begin having regular mammograms starting at age 40. This is a scan to check for breast cancer.  Colon cancer screening: It is important to start screening for colon cancer at age 45.  Have a colonoscopy test every 10 years (or more often if you're at risk) Or, ask your provider about stool tests like a FIT test every year or Cologuard test every 3 years.  To learn more about your testing options, visit:   .  For help making a decision, visit:   https://bit.ly/om50170.  Prostate cancer screening test: If you have a prostate, ask your care team if a prostate cancer screening test (PSA) at age 55 is right for you.  Lung cancer screening: If you are a current or former smoker ages 50 to 80, ask your care team if ongoing lung cancer screenings are right for you.  For informational purposes only. Not to replace the advice of your health care provider. Copyright   2023 [employer]. All rights reserved. Clinically reviewed by the [facility] Transitions Program. [employer] 195441 - REV 01/24.

## 2025-03-11 NOTE — NURSING NOTE
Prior to immunization administration, verified patients identity using patient s name and date of birth. Please see Immunization Activity for additional information.     Screening Questionnaire for Adult Immunization    Are you sick today?   No   Do you have allergies to medications, food, a vaccine component or latex?   No   Have you ever had a serious reaction after receiving a vaccination?   No   Do you have a long-term health problem with heart, lung, kidney, or metabolic disease (e.g., diabetes), asthma, a blood disorder, no spleen, complement component deficiency, a cochlear implant, or a spinal fluid leak?  Are you on long-term aspirin therapy?   No   Do you have cancer, leukemia, HIV/AIDS, or any other immune system problem?   No   Do you have a parent, brother, or sister with an immune system problem?   No   In the past 3 months, have you taken medications that affect  your immune system, such as prednisone, other steroids, or anticancer drugs; drugs for the treatment of rheumatoid arthritis, Crohn s disease, or psoriasis; or have you had radiation treatments?   No   Have you had a seizure, or a brain or other nervous system problem?   No   During the past year, have you received a transfusion of blood or blood    products, or been given immune (gamma) globulin or antiviral drug?   No   For women: Are you pregnant or is there a chance you could become       pregnant during the next month?   No   Have you received any vaccinations in the past 4 weeks?   No     Immunization questionnaire answers were all negative.      Patient instructed to remain in clinic for 15 minutes afterwards, and to report any adverse reactions.     Screening performed by Lacy Cordova MA on 3/11/2025 at 8:45 AM.

## 2025-03-11 NOTE — PROGRESS NOTES
Preventive Care Visit  [facility]  Joaquina Goldman MD, Family Medicine  Mar 11, 2025      1. Routine general medical examination at a health care facility (Primary)  Reviewed/updated Health Maintenance     2. Hypertension goal BP (blood pressure) < 140/90  stable/well controlled - Continue current medication regimen.   - BASIC METABOLIC PANEL; Future  - lisinopril-hydrochlorothiazide (ZESTORETIC) 20-25 MG tablet; Take 1 tablet by mouth daily.  Dispense: 90 tablet; Refill: 3  - metoprolol succinate ER (TOPROL XL) 50 MG 24 hr tablet; Take 1 tablet (50 mg) by mouth daily.  Dispense: 90 tablet; Refill: 3    3. Coronary artery calcification  Treating with statin and ASA    4. Mixed hyperlipidemia  Continue statin.  Awaiting lipid results to determine if dose change is indicated.     - Lipid panel reflex to direct LDL Fasting; Future    5. Need for vaccination  Discussed vaccination for Hep A & B.  Titers done last year were all negative.  He wishes to start Twinrix series.  - Pneumococcal 20 Valent Conjugate (PCV20)  - HEP A & B (TWINRIX)     The longitudinal plan of care for the diagnosis(es)/condition(s) as documented were addressed during this visit. Due to the added complexity in care, I will continue to support Lev in the subsequent management and with ongoing continuity of care.        Subjective   Lev is a 54 year old, presenting for the following:  Physical        3/11/2025     7:46 AM   Additional Questions   Roomed by Garrison MERINO          HPI    Ongoing cough since bad URI in January  Dry cough  Tickle in throat  No shortness of breath  No fever       Advance Care Planning  Patient does not have a Health Care Directive: Patient states has Advance Directive and will bring in a copy to clinic.      3/9/2025   General Health   How would you rate your overall physical health? Excellent   Feel stress (tense, anxious, or unable to sleep) Not at all         3/9/2025   Nutrition   Three or  more servings of calcium each day? Yes   Diet: Regular (no restrictions)   How many servings of fruit and vegetables per day? (!) 2-3   How many sweetened beverages each day? 0-1         3/9/2025   Exercise   Days per week of moderate/strenous exercise 5 days   Average minutes spent exercising at this level 50 min         3/9/2025   Social Factors   Frequency of gathering with friends or relatives Once a week   Worry food won't last until get money to buy more No   Food not last or not have enough money for food? No   Do you have housing? (Housing is defined as stable permanent housing and does not include staying ouside in a car, in a tent, in an abandoned building, in an overnight shelter, or couch-surfing.) Yes   Are you worried about losing your housing? No   Lack of transportation? No   Unable to get utilities (heat,electricity)? No         3/9/2025   Fall Risk   Fallen 2 or more times in the past year? No   Trouble with walking or balance? No          3/9/2025   Dental   Dentist two times every year? Yes           3/4/2024   TB Screening   Were you born outside of the [place]? No           Today's PHQ-2 Score:       3/11/2025     7:34 AM   PHQ-2 ( 1999 [employer])   Q1: Little interest or pleasure in doing things 0   Q2: Feeling down, depressed or hopeless 0   PHQ-2 Score 0    Q1: Little interest or pleasure in doing things Not at all   Q2: Feeling down, depressed or hopeless Not at all   PHQ-2 Score 0       Patient-reported           3/9/2025   Substance Use   Alcohol more than 3/day or more than 7/wk No   Do you use any other substances recreationally? No     Social History     Tobacco Use    Smoking status: Never    Smokeless tobacco: Never   Vaping Use    Vaping status: Never Used   Substance Use Topics    Alcohol use: Yes     Comment: 4 Drinks per week    Drug use: No           3/9/2025   STI Screening   New sexual partner(s) since last STI/HIV test? No   Last PSA:   Prostate Specific Antigen Screen   Date Value  Ref Range Status   03/05/2024 0.41 0.00 - 3.50 ng/mL Final   05/18/2022 0.41 0.00 - 4.00 ug/L Final     ASCVD Risk   The ASCVD Risk score (Helder UGALDE, et al., 2019) failed to calculate for the following reasons:    The valid total cholesterol range is 130 to 320 mg/dL       Reviewed and updated as needed this visit by Provider   Tobacco  Allergies  Meds  Problems  Med Hx  Surg Hx  Fam Hx            BP Readings from Last 3 Encounters:   03/11/25 115/79   03/05/24 104/74   03/14/23 120/80    Wt Readings from Last 3 Encounters:   03/11/25 94.9 kg (209 lb 4.8 oz)   03/05/24 93.4 kg (205 lb 12.8 oz)   03/14/23 92.4 kg (203 lb 12.8 oz)                  Patient Active Problem List   Diagnosis    Isolated or specific phobia    Hypertension goal BP (blood pressure) < 140/90    Anxiety    History of adenomatous polyp of colon    Diverticulosis of large intestine    Coronary artery calcification    Prothrombin gene mutation    Mixed hyperlipidemia    Elevated fasting glucose     Past Surgical History:   Procedure Laterality Date    COLONOSCOPY  Oct. 2018    COLONOSCOPY N/A 2/7/2022    Procedure: COLONOSCOPY, WITH POLYPECTOMY;  Surgeon: Yane Gutierrez MD;  Location: [facility] OR    NO HISTORY OF SURGERY         Social History     Tobacco Use    Smoking status: Never    Smokeless tobacco: Never   Substance Use Topics    Alcohol use: Yes     Comment: 4 Drinks per week     Family History   Problem Relation Age of Onset    Skin Cancer Mother     Heart Disease Father 63        CAB x4 at age 63    Genetic Disease Father     Hypertension Sister     Breast Cancer Sister 48    Prostate Cancer Maternal Uncle               Objective    Exam  /79 (BP Location: Right arm, Patient Position: Sitting, Cuff Size: Adult Regular)   Pulse 69   Temp 97  F (36.1  C) (Temporal)   Resp 18   Wt 94.9 kg (209 lb 4.8 oz)   SpO2 96%   BMI 29.66 kg/m     Estimated body mass index is 29.66 kg/m  as calculated from the following:     "Height as of 3/5/24: 1.789 m (5' 10.43\").    Weight as of this encounter: 94.9 kg (209 lb 4.8 oz).    Physical Exam  GENERAL: healthy, alert and no distress  EYES: Eyes grossly normal to inspection, conjunctivae and sclerae normal  HENT: ear canals and TM's normal  NECK: no adenopathy, no asymmetry, masses, or scars and thyroid normal to palpation  RESP: lungs clear to auscultation - no rales, rhonchi or wheezes  CV: regular rate and rhythm, normal S1 S2, no S3 or S4, no murmur, click or rub  ABDOMEN: soft, nontender, no hepatosplenomegaly, no masses  MS: no gross musculoskeletal defects noted, no edema  SKIN: no suspicious lesions or rashes  NEURO: Grossly normal strength and tone, mentation intact and speech normal  PSYCH: mentation appears normal, affect normal/bright      Signed Electronically by: Joaquina Goldman MD    "

## 2025-03-13 LAB
ANION GAP SERPL CALCULATED.3IONS-SCNC: 10 MMOL/L (ref 7–15)
BUN SERPL-MCNC: 13.1 MG/DL (ref 6–20)
CALCIUM SERPL-MCNC: 10.3 MG/DL (ref 8.8–10.4)
CHLORIDE SERPL-SCNC: 103 MMOL/L (ref 98–107)
CREAT SERPL-MCNC: 0.95 MG/DL (ref 0.67–1.17)
EGFRCR SERPLBLD CKD-EPI 2021: >90 ML/MIN/1.73M2
FASTING STATUS PATIENT QL REPORTED: YES
GLUCOSE SERPL-MCNC: 103 MG/DL (ref 70–99)
HCO3 SERPL-SCNC: 29 MMOL/L (ref 22–29)
POTASSIUM SERPL-SCNC: 3.8 MMOL/L (ref 3.4–5.3)
SODIUM SERPL-SCNC: 142 MMOL/L (ref 135–145)

## 2025-08-23 ENCOUNTER — MYC REFILL (OUTPATIENT)
Dept: FAMILY MEDICINE | Facility: CLINIC | Age: 55
End: 2025-08-23
Payer: COMMERCIAL

## 2025-08-23 DIAGNOSIS — F41.9 ANXIETY: ICD-10-CM

## 2025-08-23 DIAGNOSIS — F40.298 ISOLATED OR SPECIFIC PHOBIA: ICD-10-CM

## 2025-08-24 RX ORDER — LORAZEPAM 0.5 MG/1
0.5 TABLET ORAL 2 TIMES DAILY PRN
Qty: 60 TABLET | Refills: 0 | Status: SHIPPED | OUTPATIENT
Start: 2025-08-24

## (undated) DEVICE — GOWN IMPERVIOUS 2XL BLUE

## (undated) DEVICE — SUCTION MANIFOLD NEPTUNE 2 SYS 1 PORT 702-025-000

## (undated) DEVICE — TUBING SUCTION 12"X1/4" N612

## (undated) DEVICE — KIT ENDO TURNOVER/PROCEDURE CARRY-ON 101822

## (undated) DEVICE — SPECIMEN CONTAINER 3OZ W/FORMALIN 59901

## (undated) DEVICE — SOL WATER IRRIG 500ML BOTTLE 2F7113

## (undated) DEVICE — ENDO FORCEP BX CAPTURA PRO SPIKE G50696

## (undated) RX ORDER — METOPROLOL TARTRATE 25 MG/1
TABLET, FILM COATED ORAL
Status: DISPENSED
Start: 2018-08-29